# Patient Record
Sex: FEMALE | Race: WHITE | NOT HISPANIC OR LATINO | Employment: FULL TIME | URBAN - METROPOLITAN AREA
[De-identification: names, ages, dates, MRNs, and addresses within clinical notes are randomized per-mention and may not be internally consistent; named-entity substitution may affect disease eponyms.]

---

## 2017-01-02 ENCOUNTER — APPOINTMENT (OUTPATIENT)
Dept: URGENT CARE | Age: 32
End: 2017-01-02
Payer: COMMERCIAL

## 2017-01-02 PROCEDURE — G0382 LEV 3 HOSP TYPE B ED VISIT: HCPCS | Performed by: FAMILY MEDICINE

## 2017-01-10 ENCOUNTER — ALLSCRIPTS OFFICE VISIT (OUTPATIENT)
Dept: OTHER | Facility: OTHER | Age: 32
End: 2017-01-10

## 2017-01-10 DIAGNOSIS — M25.572 PAIN IN LEFT ANKLE: ICD-10-CM

## 2017-01-10 DIAGNOSIS — A08.4 VIRAL INTESTINAL INFECTION: ICD-10-CM

## 2017-01-10 DIAGNOSIS — J01.00 ACUTE MAXILLARY SINUSITIS: ICD-10-CM

## 2017-01-10 LAB
FLUAV AG SPEC QL IA: NEGATIVE
INFLUENZA B AG (HISTORICAL): NEGATIVE

## 2017-01-12 ENCOUNTER — LAB CONVERSION - ENCOUNTER (OUTPATIENT)
Dept: OTHER | Facility: OTHER | Age: 32
End: 2017-01-12

## 2017-01-12 LAB
A/G RATIO (HISTORICAL): 1.3 (CALC) (ref 1–2.5)
ALBUMIN SERPL BCP-MCNC: 4 G/DL (ref 3.6–5.1)
ALP SERPL-CCNC: 57 U/L (ref 33–115)
ALT SERPL W P-5'-P-CCNC: 15 U/L (ref 6–29)
AST SERPL W P-5'-P-CCNC: 16 U/L (ref 10–30)
BASOPHILS # BLD AUTO: 0.3 %
BASOPHILS # BLD AUTO: 15 CELLS/UL (ref 0–200)
BILIRUB SERPL-MCNC: 0.4 MG/DL (ref 0.2–1.2)
BUN SERPL-MCNC: 12 MG/DL (ref 7–25)
BUN/CREA RATIO (HISTORICAL): 11 (CALC) (ref 6–22)
CALCIUM SERPL-MCNC: 9.1 MG/DL (ref 8.6–10.2)
CHLORIDE SERPL-SCNC: 104 MMOL/L (ref 98–110)
CLOSTRIDIUM DIFFICILE TOXIN (HISTORICAL): DETECTED
CO2 SERPL-SCNC: 29 MMOL/L (ref 20–31)
CREAT SERPL-MCNC: 1.14 MG/DL (ref 0.5–1.1)
DEPRECATED RDW RBC AUTO: 15.4 % (ref 11–15)
EGFR AFRICAN AMERICAN (HISTORICAL): 73 ML/MIN/1.73M2
EGFR-AMERICAN CALC (HISTORICAL): 63 ML/MIN/1.73M2
EOSINOPHIL # BLD AUTO: 2 %
EOSINOPHIL # BLD AUTO: 98 CELLS/UL (ref 15–500)
GAMMA GLOBULIN (HISTORICAL): 3 G/DL (CALC) (ref 1.9–3.7)
GLUCOSE (HISTORICAL): 100 MG/DL (ref 65–99)
HCT VFR BLD AUTO: 41.7 % (ref 35–45)
HGB BLD-MCNC: 13.8 G/DL (ref 11.7–15.5)
LYMPHOCYTES # BLD AUTO: 1480 CELLS/UL (ref 850–3900)
LYMPHOCYTES # BLD AUTO: 30.2 %
MCH RBC QN AUTO: 27.1 PG (ref 27–33)
MCHC RBC AUTO-ENTMCNC: 33 G/DL (ref 32–36)
MCV RBC AUTO: 81.9 FL (ref 80–100)
MONOCYTES # BLD AUTO: 372 CELLS/UL (ref 200–950)
MONOCYTES (HISTORICAL): 7.6 %
NEUTROPHILS # BLD AUTO: 2935 CELLS/UL (ref 1500–7800)
NEUTROPHILS # BLD AUTO: 59.9 %
PLATELET # BLD AUTO: 267 THOUSAND/UL (ref 140–400)
PMV BLD AUTO: 9.6 FL (ref 7.5–11.5)
POTASSIUM SERPL-SCNC: 3.8 MMOL/L (ref 3.5–5.3)
RBC # BLD AUTO: 5.09 MILLION/UL (ref 3.8–5.1)
SODIUM SERPL-SCNC: 141 MMOL/L (ref 135–146)
TOTAL PROTEIN (HISTORICAL): 7 G/DL (ref 6.1–8.1)
WBC # BLD AUTO: 4.9 THOUSAND/UL (ref 3.8–10.8)

## 2017-01-13 ENCOUNTER — LAB CONVERSION - ENCOUNTER (OUTPATIENT)
Dept: OTHER | Facility: OTHER | Age: 32
End: 2017-01-13

## 2017-01-13 ENCOUNTER — GENERIC CONVERSION - ENCOUNTER (OUTPATIENT)
Dept: OTHER | Facility: OTHER | Age: 32
End: 2017-01-13

## 2017-01-13 LAB
CLOSTRIDIUM DIFFICILE TOXIN (HISTORICAL): DETECTED
CULTURE RESULT (HISTORICAL): NORMAL
SHIGA TOXIN TEST (HISTORICAL): NORMAL

## 2017-01-15 ENCOUNTER — LAB CONVERSION - ENCOUNTER (OUTPATIENT)
Dept: OTHER | Facility: OTHER | Age: 32
End: 2017-01-15

## 2017-01-15 LAB
CLOSTRIDIUM DIFFICILE TOXIN (HISTORICAL): DETECTED
CULTURE RESULT (HISTORICAL): NORMAL
CULTURE RESULT (HISTORICAL): NORMAL
SHIGA TOXIN TEST (HISTORICAL): NORMAL

## 2017-02-03 ENCOUNTER — HOSPITAL ENCOUNTER (EMERGENCY)
Facility: HOSPITAL | Age: 32
Discharge: HOME | End: 2017-02-04
Attending: EMERGENCY MEDICINE | Admitting: EMERGENCY MEDICINE
Payer: COMMERCIAL

## 2017-02-03 ENCOUNTER — APPOINTMENT (EMERGENCY)
Dept: CT IMAGING | Facility: HOSPITAL | Age: 32
End: 2017-02-03
Payer: COMMERCIAL

## 2017-02-03 DIAGNOSIS — K59.00 CONSTIPATION: Primary | ICD-10-CM

## 2017-02-03 LAB
ALBUMIN SERPL BCP-MCNC: 3.8 G/DL (ref 3.5–5)
ALP SERPL-CCNC: 70 U/L (ref 46–116)
ALT SERPL W P-5'-P-CCNC: 21 U/L (ref 12–78)
ANION GAP SERPL CALCULATED.3IONS-SCNC: 5 MMOL/L (ref 4–13)
AST SERPL W P-5'-P-CCNC: 16 U/L (ref 5–45)
BASOPHILS # BLD AUTO: 0.02 THOUSANDS/ΜL (ref 0–0.1)
BASOPHILS NFR BLD AUTO: 0 % (ref 0–1)
BILIRUB SERPL-MCNC: 0.4 MG/DL (ref 0.2–1)
BUN SERPL-MCNC: 15 MG/DL (ref 5–25)
CALCIUM SERPL-MCNC: 8.9 MG/DL (ref 8.3–10.1)
CHLORIDE SERPL-SCNC: 104 MMOL/L (ref 100–108)
CO2 SERPL-SCNC: 31 MMOL/L (ref 21–32)
CREAT SERPL-MCNC: 0.98 MG/DL (ref 0.6–1.3)
EOSINOPHIL # BLD AUTO: 0.35 THOUSAND/ΜL (ref 0–0.61)
EOSINOPHIL NFR BLD AUTO: 3 % (ref 0–6)
ERYTHROCYTE [DISTWIDTH] IN BLOOD BY AUTOMATED COUNT: 15 % (ref 11.6–15.1)
GFR SERPL CREATININE-BSD FRML MDRD: >60 ML/MIN/1.73SQ M
GLUCOSE SERPL-MCNC: 100 MG/DL (ref 65–140)
HCT VFR BLD AUTO: 40.4 % (ref 34.8–46.1)
HGB BLD-MCNC: 13.2 G/DL (ref 11.5–15.4)
LIPASE SERPL-CCNC: 237 U/L (ref 73–393)
LYMPHOCYTES # BLD AUTO: 3.52 THOUSANDS/ΜL (ref 0.6–4.47)
LYMPHOCYTES NFR BLD AUTO: 35 % (ref 14–44)
MCH RBC QN AUTO: 27.7 PG (ref 26.8–34.3)
MCHC RBC AUTO-ENTMCNC: 32.7 G/DL (ref 31.4–37.4)
MCV RBC AUTO: 85 FL (ref 82–98)
MONOCYTES # BLD AUTO: 0.87 THOUSAND/ΜL (ref 0.17–1.22)
MONOCYTES NFR BLD AUTO: 9 % (ref 4–12)
NEUTROPHILS # BLD AUTO: 5.42 THOUSANDS/ΜL (ref 1.85–7.62)
NEUTS SEG NFR BLD AUTO: 53 % (ref 43–75)
PLATELET # BLD AUTO: 247 THOUSANDS/UL (ref 149–390)
PMV BLD AUTO: 10.9 FL (ref 8.9–12.7)
POTASSIUM SERPL-SCNC: 3.6 MMOL/L (ref 3.5–5.3)
PROT SERPL-MCNC: 7.4 G/DL (ref 6.4–8.2)
RBC # BLD AUTO: 4.77 MILLION/UL (ref 3.81–5.12)
SODIUM SERPL-SCNC: 140 MMOL/L (ref 136–145)
WBC # BLD AUTO: 10.18 THOUSAND/UL (ref 4.31–10.16)

## 2017-02-03 PROCEDURE — 80053 COMPREHEN METABOLIC PANEL: CPT | Performed by: EMERGENCY MEDICINE

## 2017-02-03 PROCEDURE — 74177 CT ABD & PELVIS W/CONTRAST: CPT

## 2017-02-03 PROCEDURE — 85025 COMPLETE CBC W/AUTO DIFF WBC: CPT | Performed by: EMERGENCY MEDICINE

## 2017-02-03 PROCEDURE — 36415 COLL VENOUS BLD VENIPUNCTURE: CPT | Performed by: EMERGENCY MEDICINE

## 2017-02-03 PROCEDURE — 81002 URINALYSIS NONAUTO W/O SCOPE: CPT | Performed by: EMERGENCY MEDICINE

## 2017-02-03 PROCEDURE — 83690 ASSAY OF LIPASE: CPT | Performed by: EMERGENCY MEDICINE

## 2017-02-03 PROCEDURE — 96374 THER/PROPH/DIAG INJ IV PUSH: CPT

## 2017-02-03 PROCEDURE — 81025 URINE PREGNANCY TEST: CPT | Performed by: EMERGENCY MEDICINE

## 2017-02-03 RX ORDER — KETOROLAC TROMETHAMINE 30 MG/ML
15 INJECTION, SOLUTION INTRAMUSCULAR; INTRAVENOUS ONCE
Status: COMPLETED | OUTPATIENT
Start: 2017-02-03 | End: 2017-02-03

## 2017-02-03 RX ADMIN — KETOROLAC TROMETHAMINE 15 MG: 30 INJECTION, SOLUTION INTRAMUSCULAR at 23:07

## 2017-02-04 VITALS
HEART RATE: 85 BPM | BODY MASS INDEX: 31.95 KG/M2 | SYSTOLIC BLOOD PRESSURE: 104 MMHG | WEIGHT: 204 LBS | OXYGEN SATURATION: 99 % | RESPIRATION RATE: 16 BRPM | TEMPERATURE: 98.2 F | DIASTOLIC BLOOD PRESSURE: 60 MMHG

## 2017-02-04 LAB
CLARITY, POC: CLEAR
COLOR, POC: YELLOW
EXT BILIRUBIN, UA: NORMAL
EXT BLOOD URINE: NORMAL
EXT GLUCOSE, UA: NORMAL
EXT KETONES: NORMAL
EXT NITRITE, UA: NORMAL
EXT PH, UA: 6
EXT PROTEIN, UA: NORMAL
EXT SPECIFIC GRAVITY, UA: 1.01
EXT UROBILINOGEN: 0.2
HCG UR QL: NORMAL
WBC # BLD EST: NORMAL 10*3/UL

## 2017-02-04 PROCEDURE — 99284 EMERGENCY DEPT VISIT MOD MDM: CPT

## 2017-02-04 RX ORDER — LIDOCAINE HYDROCHLORIDE 20 MG/ML
JELLY TOPICAL ONCE
Status: COMPLETED | OUTPATIENT
Start: 2017-02-04 | End: 2017-02-04

## 2017-02-04 RX ORDER — MAGNESIUM CARB/ALUMINUM HYDROX 105-160MG
296 TABLET,CHEWABLE ORAL ONCE
Status: COMPLETED | OUTPATIENT
Start: 2017-02-04 | End: 2017-02-04

## 2017-02-04 RX ORDER — POLYETHYLENE GLYCOL 3350 17 G/17G
17 POWDER, FOR SOLUTION ORAL DAILY
Qty: 510 G | Refills: 0 | Status: SHIPPED | OUTPATIENT
Start: 2017-02-04 | End: 2018-06-15 | Stop reason: ALTCHOICE

## 2017-02-04 RX ADMIN — LIDOCAINE HYDROCHLORIDE: 20 JELLY TOPICAL at 02:02

## 2017-02-04 RX ADMIN — IOHEXOL 100 ML: 350 INJECTION, SOLUTION INTRAVENOUS at 00:39

## 2017-02-04 RX ADMIN — MAGESIUM CITRATE 296 ML: 1.75 LIQUID ORAL at 02:02

## 2017-03-13 ENCOUNTER — ALLSCRIPTS OFFICE VISIT (OUTPATIENT)
Dept: OTHER | Facility: OTHER | Age: 32
End: 2017-03-13

## 2017-03-13 LAB — S PYO AG THROAT QL: NEGATIVE

## 2017-04-20 PROCEDURE — G0145 SCR C/V CYTO,THINLAYER,RESCR: HCPCS | Performed by: OBSTETRICS & GYNECOLOGY

## 2017-04-21 ENCOUNTER — LAB REQUISITION (OUTPATIENT)
Dept: LAB | Facility: HOSPITAL | Age: 32
End: 2017-04-21
Payer: COMMERCIAL

## 2017-04-21 DIAGNOSIS — Z12.72 ENCOUNTER FOR SCREENING FOR MALIGNANT NEOPLASM OF VAGINA: ICD-10-CM

## 2017-04-21 DIAGNOSIS — Z01.419 ENCOUNTER FOR GYNECOLOGICAL EXAMINATION WITHOUT ABNORMAL FINDING: ICD-10-CM

## 2017-04-21 DIAGNOSIS — Z11.51 ENCOUNTER FOR SCREENING FOR HUMAN PAPILLOMAVIRUS (HPV): ICD-10-CM

## 2017-04-26 LAB
LAB AP GYN PRIMARY INTERPRETATION: NORMAL
Lab: NORMAL

## 2017-05-24 ENCOUNTER — ALLSCRIPTS OFFICE VISIT (OUTPATIENT)
Dept: OTHER | Facility: OTHER | Age: 32
End: 2017-05-24

## 2017-09-21 ENCOUNTER — ALLSCRIPTS OFFICE VISIT (OUTPATIENT)
Dept: OTHER | Facility: OTHER | Age: 32
End: 2017-09-21

## 2017-10-13 ENCOUNTER — ALLSCRIPTS OFFICE VISIT (OUTPATIENT)
Dept: OTHER | Facility: OTHER | Age: 32
End: 2017-10-13

## 2017-10-13 DIAGNOSIS — R74.8 ABNORMAL LEVELS OF OTHER SERUM ENZYMES: ICD-10-CM

## 2017-10-13 DIAGNOSIS — Z00.00 ENCOUNTER FOR GENERAL ADULT MEDICAL EXAMINATION WITHOUT ABNORMAL FINDINGS: ICD-10-CM

## 2017-10-13 DIAGNOSIS — Z13.220 ENCOUNTER FOR SCREENING FOR LIPOID DISORDERS: ICD-10-CM

## 2017-10-20 ENCOUNTER — HOSPITAL ENCOUNTER (EMERGENCY)
Facility: HOSPITAL | Age: 32
Discharge: HOME | End: 2017-10-20
Attending: EMERGENCY MEDICINE | Admitting: EMERGENCY MEDICINE
Payer: COMMERCIAL

## 2017-10-20 ENCOUNTER — APPOINTMENT (EMERGENCY)
Dept: CT IMAGING | Facility: HOSPITAL | Age: 32
End: 2017-10-20
Payer: COMMERCIAL

## 2017-10-20 VITALS
WEIGHT: 215 LBS | BODY MASS INDEX: 33.67 KG/M2 | SYSTOLIC BLOOD PRESSURE: 122 MMHG | RESPIRATION RATE: 16 BRPM | DIASTOLIC BLOOD PRESSURE: 80 MMHG | OXYGEN SATURATION: 98 % | TEMPERATURE: 97.8 F | HEART RATE: 78 BPM
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
## 2017-10-20 DIAGNOSIS — S16.1XXA ACUTE CERVICAL MYOFASCIAL STRAIN, INITIAL ENCOUNTER: Primary | ICD-10-CM

## 2017-10-20 PROCEDURE — 99284 EMERGENCY DEPT VISIT MOD MDM: CPT

## 2017-10-20 PROCEDURE — 72125 CT NECK SPINE W/O DYE: CPT

## 2017-10-20 RX ORDER — DIAZEPAM 5 MG/1
5 TABLET ORAL ONCE
Status: COMPLETED | OUTPATIENT
Start: 2017-10-20 | End: 2017-10-20

## 2017-10-20 RX ORDER — CYCLOBENZAPRINE HCL 10 MG
10 TABLET ORAL 3 TIMES DAILY PRN
Qty: 20 TABLET | Refills: 0 | Status: SHIPPED | OUTPATIENT
Start: 2017-10-20 | End: 2018-04-18 | Stop reason: ALTCHOICE

## 2017-10-20 RX ORDER — NAPROXEN 500 MG/1
500 TABLET ORAL 2 TIMES DAILY PRN
Qty: 20 TABLET | Refills: 0 | Status: SHIPPED | OUTPATIENT
Start: 2017-10-20 | End: 2018-04-18 | Stop reason: ALTCHOICE

## 2017-10-20 RX ADMIN — DIAZEPAM 5 MG: 5 TABLET ORAL at 19:03
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
## 2017-10-20 NOTE — DISCHARGE INSTRUCTIONS
Cervical Strain   WHAT YOU NEED TO KNOW:   A cervical strain is a stretched or torn muscle or tendon in your neck  Tendons are strong tissues that connect muscles to bones  Common causes of cervical strains include a car accident, a fall, or a sports injury  DISCHARGE INSTRUCTIONS:   Return to the emergency department if:   · You have pain or numbness from your shoulder down to your hand  · You have problems with your vision, hearing, or balance  · You feel confused or cannot concentrate  · You have problems with movement and strength  Contact your healthcare provider if:   · You have increased swelling or pain in your neck  · You have questions or concerns about your condition or care  Medicines: You may need any of the following:  · Acetaminophen  decreases pain and fever  It is available without a doctor's order  Ask how much to take and how often to take it  Follow directions  Read the labels of all other medicines you are using to see if they also contain acetaminophen, or ask your doctor or pharmacist  Acetaminophen can cause liver damage if not taken correctly  Do not use more than 4 grams (4,000 milligrams) total of acetaminophen in one day  · NSAIDs , such as ibuprofen, help decrease swelling, pain, and fever  This medicine is available with or without a doctor's order  NSAIDs can cause stomach bleeding or kidney problems in certain people  If you take blood thinner medicine, always ask your healthcare provider if NSAIDs are safe for you  Always read the medicine label and follow directions  · Muscle relaxers  help decrease pain and muscle spasms  · Prescription pain medicine  may be given  Ask your healthcare provider how to take this medicine safely  Some prescription pain medicines contain acetaminophen  Do not take other medicines that contain acetaminophen without talking to your healthcare provider  Too much acetaminophen may cause liver damage   Prescription pain medicine may cause constipation  Ask your healthcare provider how to prevent or treat constipation  · Take your medicine as directed  Contact your healthcare provider if you think your medicine is not helping or if you have side effects  Tell him or her if you are allergic to any medicine  Keep a list of the medicines, vitamins, and herbs you take  Include the amounts, and when and why you take them  Bring the list or the pill bottles to follow-up visits  Carry your medicine list with you in case of an emergency  Manage your symptoms:   · Apply heat  on your neck for 15 to 20 minutes, 4 to 6 times a day or as directed  Heat helps decrease pain, stiffness, and muscle spasms  · Begin gentle neck exercises  as soon as you can move your neck without pain  Exercises will help decrease stiffness and improve the strength and movement of your neck  Ask your healthcare provider what kind of exercises you should do  · Gradually return to your usual activities as directed  Stop if you have pain  Avoid activities that can cause more damage to your neck, such as heavy lifting or strenuous exercise  · Sleep without a pillow  to help decrease pain  Instead, roll a small towel tightly and place it under your neck  · Go to physical therapy as directed  A physical therapist teaches you exercises to help improve movement and strength, and to decrease pain  Prevent neck injury:   · Drive safely  Make sure everyone in your car wears a seatbelt  A seatbelt can save your life if you are in an accident  Do not use your cell phone when you are driving  This could distract you and cause an accident  Pull over if you need to make a call or send a text message  · Wear helmets, lifejackets, and protective gear  Always wear a helmet when you ride a bike or motorcycle, go skiing, or play sports that could cause a head injury  Wear protective equipment when you play sports   Wear a lifejacket when you are on a boat or doing water sports  Follow up with your healthcare provider as directed: You may be referred to an orthopedist or physical therapies  Write down your questions so you remember to ask them during your visits  © 2017 [address] Information is for End User's use only and may not be sold, redistributed or otherwise used for commercial purposes  All illustrations and images included in CareNotes® are the copyrighted property of [employer]  or [address]  The above information is an  only  It is not intended as medical advice for individual conditions or treatments  Talk to your doctor, nurse or pharmacist before following any medical regimen to see if it is safe and effective for you  Motor Vehicle Accident   WHAT YOU NEED TO KNOW:   A motor vehicle accident (MVA) can cause injury from the impact or from being thrown around inside the car  You may have a bruise on your abdomen, chest, or neck from the seatbelt  You may also have pain in your face, neck, or back  You may have pain in your knee, hip, or thigh if your body hits the dash or the steering wheel  Muscle pain is commonly worse 1 to 2 days after an MVA  DISCHARGE INSTRUCTIONS:   Call 911 if:   · You have new or worsening chest pain or shortness of breath  Return to the emergency department if:   · You have new or worsening pain in your abdomen  · You have nausea and vomiting that does not get better  · You have a severe headache  · You have weakness, tingling, or numbness in your arms or legs  · You have new or worsening pain that makes it hard for you to move  Contact your healthcare provider if:   · You have pain that develops 2 to 3 days after the MVA  · You have questions or concerns about your condition or care  Medicines:   · Pain medicine: You may be given medicine to take away or decrease pain  Do not wait until the pain is severe before you take your medicine      · NSAIDs , such as ibuprofen, help decrease swelling, pain, and fever  This medicine is available with or without a doctor's order  NSAIDs can cause stomach bleeding or kidney problems in certain people  If you take blood thinner medicine, always ask if NSAIDs are safe for you  Always read the medicine label and follow directions  Do not give these medicines to children under 10months of age without direction from your child's healthcare provider  · Take your medicine as directed  Contact your healthcare provider if you think your medicine is not helping or if you have side effects  Tell him of her if you are allergic to any medicine  Keep a list of the medicines, vitamins, and herbs you take  Include the amounts, and when and why you take them  Bring the list or the pill bottles to follow-up visits  Carry your medicine list with you in case of an emergency  Follow up with your healthcare provider as directed:  Write down your questions so you remember to ask them during your visits  Safety tips:   · Always wear your seatbelt  This will help reduce serious injury from an MVA  · Use child safety seats  Your child needs to ride in a child safety seat made for his age, height, and weight  Ask your healthcare provider for more information about child safety seats  · Decrease speed  Drive the speed limit to reduce your risk for an MVA  · Do not drive if you are tired  You will react more slowly when you are tired  The slowed reaction time will increase your risk for an MVA  · Do not talk or text on your cell phone while you drive  You cannot respond fast enough in an emergency if you are distracted by texts or conversations  · Do not drink and drive  Use a designated   Call a taxi or get a ride home with someone if you have been drinking  Do not let your friends drive if they have been drinking alcohol  · Do not use illegal drugs and drive    You may be more tired or take risks that you normally would not take  Do not drive after you take prescription medicines that make you sleepy  Self-care:   · Use ice and heat  Ice helps decrease swelling and pain  Ice may also help prevent tissue damage  Use an ice pack, or put crushed ice in a plastic bag  Cover it with a towel and apply to your injured area for 15 to 20 minutes every hour, or as directed  After 2 days, use a heating pad on your injured area  Use heat as directed  · Gently stretch  Use gentle exercises to stretch your muscles after an MVA  Ask your healthcare provider for exercises you can do  © 2017 [facility] Information is for End User's use only and may not be sold, redistributed or otherwise used for commercial purposes  All illustrations and images included in CareNotes® are the copyrighted property of [employer]  or [employer]  The above information is an  only  It is not intended as medical advice for individual conditions or treatments  Talk to your doctor, nurse or pharmacist before following any medical regimen to see if it is safe and effective for you

## 2017-10-28 ENCOUNTER — GENERIC CONVERSION - ENCOUNTER (OUTPATIENT)
Dept: OTHER | Facility: OTHER | Age: 32
End: 2017-10-28

## 2017-12-31 ENCOUNTER — LAB CONVERSION - ENCOUNTER (OUTPATIENT)
Dept: OTHER | Facility: OTHER | Age: 32
End: 2017-12-31

## 2017-12-31 LAB
BUN SERPL-MCNC: 11 MG/DL (ref 7–25)
BUN/CREA RATIO (HISTORICAL): NORMAL (CALC) (ref 6–22)
CALCIUM SERPL-MCNC: 8.8 MG/DL (ref 8.6–10.2)
CHLORIDE SERPL-SCNC: 107 MMOL/L (ref 98–110)
CO2 SERPL-SCNC: 25 MMOL/L (ref 20–31)
CREAT SERPL-MCNC: 0.99 MG/DL (ref 0.5–1.1)
EGFR AFRICAN AMERICAN (HISTORICAL): 86 ML/MIN/1.73M2
EGFR-AMERICAN CALC (HISTORICAL): 74 ML/MIN/1.73M2
GLUCOSE (HISTORICAL): 90 MG/DL (ref 65–99)
POTASSIUM SERPL-SCNC: 4.3 MMOL/L (ref 3.5–5.3)
SODIUM SERPL-SCNC: 139 MMOL/L (ref 135–146)
TSH SERPL DL<=0.05 MIU/L-ACNC: 0.9 MIU/L

## 2018-01-11 NOTE — MISCELLANEOUS
Message  Return to work or school:   Zia Russell is under my professional care   She was seen in my office on 1/10/17   She is able to return to work on  1/16/17            Signatures   Electronically signed by : Melva Romero, [facility]; Jan 13 2017  2:54PM EST                       (Author)

## 2018-01-11 NOTE — MISCELLANEOUS
Message  Pt was admitted 16 to [facility] for IOL secondary to preeclampsia and delivered a live boy via   Discharged 16 to home  Pt will f/u with OB so no JALIL needed at our office  [address]  Active Problems    1  Acute maxillary sinusitis (461 0) (J01 00)   2  Ankle pain, left (719 47) (M25 572)   3  Chronic cough (786 2) (R05)   4  Need for diphtheria-tetanus-pertussis (Tdap) vaccine, adult/adolescent (V06 1) (Z23)   5  Preop examination (V72 84) (Z01 818)    Current Meds   1  Flovent HFA 44 MCG/ACT Inhalation Aerosol; INHALE 1 PUFF TWICE DAILY; Therapy: [id number] to (Last Rx:2015)  Requested for: 2015 Ordered   2  ProAir  (90 Base) MCG/ACT Inhalation Aerosol Solution; INHALE 1 TO 2 PUFFS   EVERY 4 TO 6 HOURS AS NEEDED; Therapy: [id number] to (Last Rx:2015)  Requested for: [id number] Ordered    Allergies    1  No Known Drug Allergies    2  No Known Environmental Allergies   3   No Known Food Allergies    Signatures   Electronically signed by : Chasity Rodriguez, ; 2016  1:30PM EST                       (Author)

## 2018-01-12 VITALS
BODY MASS INDEX: 32.01 KG/M2 | DIASTOLIC BLOOD PRESSURE: 78 MMHG | TEMPERATURE: 100.2 F | SYSTOLIC BLOOD PRESSURE: 110 MMHG | WEIGHT: 204.38 LBS | OXYGEN SATURATION: 98 % | HEART RATE: 104 BPM

## 2018-01-14 VITALS
HEIGHT: 67 IN | TEMPERATURE: 99 F | BODY MASS INDEX: 32.8 KG/M2 | OXYGEN SATURATION: 99 % | HEART RATE: 80 BPM | WEIGHT: 209 LBS | DIASTOLIC BLOOD PRESSURE: 78 MMHG | SYSTOLIC BLOOD PRESSURE: 104 MMHG

## 2018-01-14 VITALS
HEIGHT: 67 IN | DIASTOLIC BLOOD PRESSURE: 78 MMHG | OXYGEN SATURATION: 98 % | WEIGHT: 218 LBS | HEART RATE: 76 BPM | SYSTOLIC BLOOD PRESSURE: 112 MMHG | TEMPERATURE: 98.4 F | BODY MASS INDEX: 34.21 KG/M2

## 2018-01-14 VITALS
SYSTOLIC BLOOD PRESSURE: 116 MMHG | TEMPERATURE: 98 F | OXYGEN SATURATION: 98 % | WEIGHT: 216.38 LBS | HEART RATE: 66 BPM | BODY MASS INDEX: 33.96 KG/M2 | HEIGHT: 67 IN | DIASTOLIC BLOOD PRESSURE: 74 MMHG

## 2018-01-15 NOTE — PROGRESS NOTES
Assessment   1  Chronic cough (786 2) (R05)  2  Lipid screening (V77 91) (Z13 220)    Plan  Depression screen    · *VB-Depression Screening; Status:Complete - Retrospective By Protocol Authorization;    Done: 73LLZ5739 04:18PM  Health Maintenance    · (1) CBC/ PLT (NO DIFF); Status:Active; Requested for:13Oct2017;    · (1) COMPREHENSIVE METABOLIC PANEL; Status:Active; Requested VCZ:83CVO1063;    · Follow-up visit in 1 year Evaluation and Treatment  Follow-up  Status: Hold For -  Scheduling  Requested for: 22ODQ5564   · Be sure to get at least 8 hours of sleep every night ; Status:Complete;   Done: 91ZZD1097  04:39PM   · Eat small frequent meals ; Status:Complete;   Done: 19GHL0577 04:39PM   · Keep a diary of when and what you eat ; Status:Complete;   Done: 71CVE6712 04:39PM   · Some eating tips that can help you lose weight ; Status:Complete;   Done: 55BUW7586  04:39PM   · There are many exercise options for seniors ; Status:Complete;   Done: 06WCW0556  04:39PM   · We recommend that you bring your body mass index down to 26 ; Status:Complete;    Done: 02OOS5706 04:39PM   · Call [phone] if: The symptoms seem worse ; Status:Complete;   Done: 95SZH3238  04:39PM  Health Maintenance, Lipid screening    · (1) LIPID PANEL, FASTING; Status:Active; Requested for:13Oct2017;     Discussion/Summary  health maintenance visit Currently, she eats an adequate diet  the risks and benefits of cervical cancer screening were discussed Breast cancer screening: breast cancer screening is not indicated  Colorectal cancer screening: colorectal cancer screening is not indicated  Osteoporosis screening: bone mineral density testing is not indicated  Screening lab work includes hemoglobin, glucose and lipid profile  The risks and benefits of immunizations were discussed  Advice and education were given regarding nutrition, aerobic exercise, weight bearing exercise, weight loss, alcohol use, sunscreen use and seat belt use   Patient discussion: discussed with the patient  75-year-old female:  1  Health maintenance: Patient will receive influenza immunization once her bronchitis has resolved  Otherwise she has no active issues/concerns  Will order a lipid panel for screening, CBC and CMP  Contact office for any questions or concerns  The patient was counseled regarding diagnostic results, instructions for management, risk factor reductions, prognosis, patient and family education, impressions, risks and benefits of treatment options, importance of compliance with treatment  Educational resources provided:   Possible side effects of new medications were reviewed with the patient/guardian today  The treatment plan was reviewed with the patient/guardian  The patient/guardian understands and agrees with the treatment plan      Chief Complaint  PT here for Yearly physical  Would like to have work up her thyroid due to Family history Mother has Hyperthyroid  PT stated still has cough that is productive with yellow thick mucus stated Benzonatate did not work   Also stated she is feeling very tired and irritable lately  Discuss other inhalers pt stated they do help her      History of Present Illness  HM, Adult Female: The patient is being seen for a health maintenance evaluation  The last health maintenance visit was 1 year(s) ago  Social History: Household members include spouse and 1 son(s)  She is   Work status: working full time  The patient has never smoked cigarettes  She reports occasional alcohol use  She has never used illicit drugs  General Health: The patient's health since the last visit is described as good  She has regular dental visits  She denies vision problems  She denies hearing loss  Immunizations status: up to date  Lifestyle:  She consumes a diverse and healthy diet  She does not have any weight concerns  She does not exercise regularly  She does not use tobacco  She consumes alcohol  She denies drug use  Reproductive health: the patient is premenopausal   she reports normal menses  she uses contraception  she is sexually active  pregnancy history:  Screening: cancer screening reviewed and current  metabolic screening reviewed and current  risk screening reviewed and current  HPI: 58-year-old female is seen today for routine annual examination  Other than continued symptoms of bronchitis, she has no other active complaints  1        1 Amended By: Rajesh Yao; Oct 13 2017 4:45 PM EST    Review of Systems    Constitutional: no fever, no recent weight gain, no chills and no recent weight loss  Eyes: no eye pain, no eyesight problems, no dryness of the eyes, eyes not red and no itching of the eyes  ENT: no earache, no nosebleeds, no sore throat, no hearing loss and no hoarseness  Cardiovascular: no chest pain, no intermittent leg claudication, no palpitations and no lower extremity edema  Respiratory: cough, but no shortness of breath, no wheezing, no shortness of breath during exertion and no PND  Gastrointestinal: no abdominal pain, no nausea, no vomiting, no constipation, no diarrhea and no blood in stools  Genitourinary: no dysuria and no incontinence  Musculoskeletal: no arthralgias and no myalgias  Integumentary: no rashes and no skin lesions  Neurological: no headache, no numbness and no dizziness  Psychiatric: no anxiety and no depression  Endocrine: no muscle weakness  Hematologic/Lymphatic: no swollen glands, no tendency for easy bleeding, no tendency for easy bruising and no swollen glands in the neck  Over the past 2 weeks, how often have you been bothered by the following problems? 1 ) Little interest or pleasure in doing things? Not at all    2 ) Feeling down, depressed or hopeless? Half the days or more  3 ) Trouble falling asleep or sleeping too much? Half the days or more  4 ) Feeling tired or having little energy? Nearly every day     5 ) Poor appetite or overeating? Nearly every day  6 ) Feeling bad about yourself, or that you are a failure, or have let yourself or your family down? Not at all    7 ) Trouble concentrating on things, such as reading a newspaper or watching television? Not at all    8 ) Moving or speaking so slowly that other people could have noticed, or the opposite, moving or speaking faster than usual? Not at all  severity of depression is moderate   How difficult have these problems made it for you to do your work, take care of things at home, or get along with people? Somewhat difficult  Score 10     ROS reviewed  Active Problems   1   Chronic cough (786 2) (R05)    Past Medical History    · History of Acute maxillary sinusitis (461 0) (J01 00)   · History of Acute URI (465 9) (J06 9)   · History of Ankle pain, left (719 47) (M25 572)   · History of Bilateral acute otitis media (382 9) (H66 93)   · History of C  difficile diarrhea (008 45) (A04 72)   · History of Denial Of Any Significant Medical History   · History of Fall on same level from slipping, tripping, or stumbling with subsequent  striking against other object (V082 5) (W63 264I)   · History of Foot pain (729 5) (M79 673)   · History of constipation (V12 79) (Z87 19)   · History of fever (V13 89) (C12 166)   · History of headache (V13 89) (F94 843)   · History of headache (V13 89) (Q84 958)   · History of hypercholesterolemia (V12 29) (Z86 39)   · History of viral gastroenteritis (V12 09) (Z86 19)   · History of Lack of energy (780 79) (R53 83)   · History of Left ankle pain (719 47) (M25 572)   · History of Pain in joint of right wrist (719 43) (M25 531)   · History of Snoring (786 09) (R06 83)   · History of Wrist Sprain (842 00)    Surgical History    · History of Ankle Surgery   · History of Ostectomy Calcaneus For Spur    Family History  Mother    · Family history of Hyperthyroidism  Father    · Family history of Essential Hypertension  Paternal Grandfather    · Family history of Diabetes Mellitus (V18 0)    Social History    · Being A Social Drinker   ·    · Mental disability (319) (F79)   · Attention Deficit Hyperactivity Disorder ( tx with ritalin and wellbutrin)depression   · Never A Smoker   · Never Used Drugs   · Number of children   · none   · Occupation   · student    Current Meds  1  Asmanex 14 Metered Doses 220 MCG/INH Inhalation Aerosol Powder Breath Activated;   INHALE 1 PUFF TWICE DAILY; Therapy: 29UOG0740 to (Last Rx:77Ezm0353) Ordered  2  ProAir  (90 Base) MCG/ACT Inhalation Aerosol Solution; INHALE 1 TO 2 PUFFS   EVERY 4 TO 6 HOURS AS NEEDED; Therapy: 17SNB4521 to (Last SL:24DOA9556)  Requested for: 90MLC9082 Ordered    Allergies   1  No Known Drug Allergies   2  No Known Environmental Allergies  3  No Known Food Allergies    Vitals   Recorded: 84BYA0450 04:07PM   Temperature 98 F, Tympanic   Heart Rate 66   Systolic 367 mm Hg, LUE, Sitting   Diastolic 74 mm Hg, LUE, Sitting   Height 5 ft 7 in   Weight 216 lb 6 oz   BMI Calculated 33 89 kg/m2   BSA Calculated 2 09 m2   O2 Saturation 98, RA     Physical Exam    Constitutional   General appearance: No acute distress, well appearing and well nourished  Head and Face   Head and face: Normal     Palpation of the face and sinuses: No sinus tenderness  Eyes   Conjunctiva and lids: No swelling, erythema or discharge  Ears, Nose, Mouth, and Throat   External inspection of ears and nose: Normal     Otoscopic examination: Tympanic membranes translucent with normal light reflex  Canals patent without erythema  Hearing: Normal     Nasal mucosa, septum, and turbinates: Normal without edema or erythema  Lips, teeth, and gums: Normal, good dentition  Oropharynx: Normal with no erythema, edema, exudate or lesions  Neck   Neck: Supple, symmetric, trachea midline, no masses  Thyroid: Normal, no thyromegaly      Pulmonary   Respiratory effort: No increased work of breathing or signs of respiratory distress  Percussion of chest: Normal     Palpation of chest: Normal     Auscultation of lungs: Clear to auscultation  Cardiovascular   Palpation of heart: Normal PMI, no thrills  Auscultation of heart: Normal rate and rhythm, normal S1 and S2, no murmurs  Femoral pulses: 2+ bilaterally  Examination of extremities for edema and/or varicosities: Normal     Abdomen   Abdomen: Non-tender, no masses  Liver and spleen: No hepatomegaly or splenomegaly  Lymphatic   Palpation of lymph nodes in neck: No lymphadenopathy  Musculoskeletal   Gait and station: Normal     Digits and nails: Normal without clubbing or cyanosis  Joints, bones, and muscles: Normal     Range of motion: Normal     Stability: Normal     Muscle strength/tone: Normal     Skin   Skin and subcutaneous tissue: Normal without rashes or lesions  Palpation of skin and subcutaneous tissue: Normal turgor  Neurologic   Cranial nerves: Cranial nerves II-XII intact  Cortical function: Normal mental status  Reflexes: 2+ and symmetric  Sensation: No sensory loss  Coordination: Normal finger to nose and heel to shin  Psychiatric   Judgment and insight: Normal     Orientation to person, place, and time: Normal     Recent and remote memory: Intact  Mood and affect: Normal        Results/Data  *VB-Depression Screening 16RLW3283 04:18PM Lc Butler     Test Name Result Flag Reference   Depression Scale Result      Depression Screen - Positive Findings       Signatures   Electronically signed by :  Silvia Matson MD; Oct 13 2017  4:45PM EST                       (Author)

## 2018-01-16 NOTE — MISCELLANEOUS
Message  Return to work or school:   Peter Kan is under my professional care  She was seen in my office on 9/21/2017   She is able to return to work on  9/25/2017            Past Medical History  Active Problems And Past Medical History Reviewed: The active problems and past medical history were reviewed and updated today  Family History  Family History Reviewed: The family history was reviewed and updated today  Surgical History  Surgical History Reviewed: The surgical history was reviewed and updated today  Future Appointments    Signatures   Electronically signed by :  Aurelio Chan MD; Sep 21 2017  2:16PM EST                       (Author)

## 2018-01-18 NOTE — MISCELLANEOUS
Message  Return to work or school:   Shadia Diamond is under my professional care   She was seen in my office on 12/30/16 Excuse from work 12/30/16             Signatures   Electronically signed by : Alexx Tovar [facility]; Dec 30 2016 10:48AM EST                       (Author)    Electronically signed by : Alexx Tovar [facility]; Dec 30 2016 10:49AM EST                       (Author)    Electronically signed by : Alexx Tovar [facility]; Dec 30 2016  6:28PM EST

## 2018-01-22 VITALS
DIASTOLIC BLOOD PRESSURE: 72 MMHG | HEART RATE: 78 BPM | HEIGHT: 67 IN | BODY MASS INDEX: 38.95 KG/M2 | OXYGEN SATURATION: 97 % | WEIGHT: 248.15 LBS | TEMPERATURE: 98.4 F | SYSTOLIC BLOOD PRESSURE: 114 MMHG

## 2018-04-18 ENCOUNTER — OFFICE VISIT (OUTPATIENT)
Dept: INTERNAL MEDICINE CLINIC | Age: 33
End: 2018-04-18
Payer: COMMERCIAL

## 2018-04-18 VITALS
BODY MASS INDEX: 35.28 KG/M2 | HEART RATE: 80 BPM | WEIGHT: 224.8 LBS | OXYGEN SATURATION: 97 % | DIASTOLIC BLOOD PRESSURE: 76 MMHG | TEMPERATURE: 97.9 F | HEIGHT: 67 IN | SYSTOLIC BLOOD PRESSURE: 104 MMHG

## 2018-04-18 DIAGNOSIS — M54.9 BACK PAIN, UNSPECIFIED BACK LOCATION, UNSPECIFIED BACK PAIN LATERALITY, UNSPECIFIED CHRONICITY: ICD-10-CM

## 2018-04-18 DIAGNOSIS — J30.9 ALLERGIC RHINITIS, UNSPECIFIED SEASONALITY, UNSPECIFIED TRIGGER: ICD-10-CM

## 2018-04-18 DIAGNOSIS — J30.9 ALLERGIC SINUSITIS: Primary | ICD-10-CM

## 2018-04-18 PROBLEM — J20.9 ACUTE BRONCHITIS: Status: ACTIVE | Noted: 2017-10-13

## 2018-04-18 PROCEDURE — 99213 OFFICE O/P EST LOW 20 MIN: CPT | Performed by: NURSE PRACTITIONER

## 2018-04-18 RX ORDER — FLUTICASONE PROPIONATE 50 MCG
1 SPRAY, SUSPENSION (ML) NASAL DAILY
Qty: 16 G | Refills: 0 | Status: SHIPPED | OUTPATIENT
Start: 2018-04-18 | End: 2020-11-04 | Stop reason: ALTCHOICE

## 2018-04-18 RX ORDER — ALBUTEROL SULFATE 90 UG/1
1-2 AEROSOL, METERED RESPIRATORY (INHALATION) AS NEEDED
COMMUNITY
Start: 2015-03-19 | End: 2019-01-30

## 2018-04-18 RX ORDER — DIPHENOXYLATE HYDROCHLORIDE AND ATROPINE SULFATE 2.5; .025 MG/1; MG/1
1 TABLET ORAL DAILY
COMMUNITY
End: 2020-11-04 | Stop reason: ALTCHOICE

## 2018-04-18 RX ORDER — NORETHINDRONE ACETATE AND ETHINYL ESTRADIOL AND FERROUS FUMARATE 1MG-20(21)
1 KIT ORAL DAILY
COMMUNITY
Start: 2018-04-17 | End: 2019-08-12 | Stop reason: SDUPTHER

## 2018-04-18 NOTE — LETTER
April 18, 2018     Patient: Kayli Davis   YOB: 1985   Date of Visit: 4/18/2018       To Whom it May Concern:    Moody Bacon is under my professional care  She was seen in my office on 4/18/2018  She may return to work on 4/19/18  If you have any questions or concerns, please don't hesitate to call           Sincerely,          AKSHAT Paniagua        CC: No Recipients

## 2018-04-18 NOTE — ASSESSMENT & PLAN NOTE
Patient has complaints of lumbar back pain  Patient has been taking Tylenol and this seems to alleviate her discomfort  Patient was given handout for back stretches  Patient would like to see if she gets any better, was offered physical therapy however does not want to do physical therapy at this point time

## 2018-04-18 NOTE — PATIENT INSTRUCTIONS
Illness appears to be viral in nature  Rest and fluids advised  Educated that the course of this illness could be 2-4 weeks  Discussed symptomatic relief, such as warm steam inhalations, tylenol/ibuprofen for fevers and body aches, rest, and drink plenty of fluids  Warm salt gargles for sore throat  Discussed red flag signs to go to the ER, such as chest pain or shortness of breath  Return to the office for reevaluation if symptoms worsen or do not improve in 1-2 weeks    Start using Flonase 1-2 times per day to help open nasal passages and help with any allergy component that there may be to this illness  Also, start using over-the-counter Mucinex DM to help thin secretions and decrease cough

## 2018-04-18 NOTE — PROGRESS NOTES
Assessment/Plan:    No problem-specific Assessment & Plan notes found for this encounter  Diagnoses and all orders for this visit:    Allergic sinusitis  -     fluticasone (FLONASE) 50 mcg/act nasal spray; 1 spray into each nostril daily    Allergic rhinitis, unspecified seasonality, unspecified trigger    Back pain, unspecified back location, unspecified back pain laterality, unspecified chronicity    Other orders  -     JUNEL FE 1/20 1-20 MG-MCG per tablet; Take 1 tablet by mouth daily  -     albuterol (PROAIR HFA) 90 mcg/act inhaler; Inhale 1-2 puffs as needed  -     multivitamin (THERAGRAN) TABS; Take 1 tablet by mouth daily          Subjective:      Patient ID: Min Lainez is a 27 y o  female  Pt  Presents today for chills, scratchy voice, bilateral ear pressure and low back pain  Her symptoms started yesterday and patient took Tylenol  Has been taking excedrine migraine this morning  Did get flu shot this year  Patient also has complaints of back pain to the lumbar area, has been taking Tylenol/Advil with relief  URI    This is a new problem  The current episode started yesterday  The problem has been waxing and waning  Maximum temperature: low grade fever 99 9  Associated symptoms include ear pain, headaches (pressure over left side of the eye), a plugged ear sensation and a sore throat  Pertinent negatives include no abdominal pain, chest pain, congestion, coughing, diarrhea, dysuria, nausea, neck pain, rash, rhinorrhea, sinus pain, vomiting or wheezing  Associated symptoms comments: Body aches  Treatments tried: Claritin  The treatment provided no relief         The following portions of the patient's history were reviewed and updated as appropriate: allergies, current medications, past family history, past medical history, past social history, past surgical history and problem list     Review of Systems   Constitutional: Positive for appetite change (decreased ) and chills  Negative for activity change, diaphoresis and fever  HENT: Positive for ear pain and sore throat  Negative for congestion, ear discharge, postnasal drip, rhinorrhea, sinus pain and sinus pressure  Eyes: Negative for pain, discharge, itching and visual disturbance  Respiratory: Negative for cough, chest tightness, shortness of breath and wheezing  Cardiovascular: Negative for chest pain, palpitations and leg swelling  Gastrointestinal: Negative for abdominal pain, constipation, diarrhea, nausea and vomiting  Denies loss of bowel or bladder   Endocrine: Negative for polydipsia, polyphagia and polyuria  Genitourinary: Negative for difficulty urinating, dysuria and urgency  Musculoskeletal: Positive for back pain  Negative for arthralgias and neck pain  Skin: Negative for rash and wound  Neurological: Positive for dizziness (one episode this morning) and headaches (pressure over left side of the eye)  Negative for weakness and numbness  Past Medical History:   Diagnosis Date    ADHD (attention deficit hyperactivity disorder)     Asthma     sports induced    Chiari malformation type II ([facility] [address] )     Clostridium difficile infection     Pre-eclampsia in third trimester 7/21/2016    Varicella     childhood         Current Outpatient Prescriptions:     albuterol (PROAIR HFA) 90 mcg/act inhaler, Inhale 1-2 puffs as needed, Disp: , Rfl:     albuterol (PROVENTIL HFA,VENTOLIN HFA) 90 mcg/act inhaler, Inhale 2 puffs every 6 (six) hours as needed for wheezing , Disp: , Rfl:     folic acid (FOLVITE) 1 mg tablet, Take 1 mg by mouth daily  , Disp: , Rfl:     JUNEL FE 1/20 1-20 MG-MCG per tablet, Take 1 tablet by mouth daily, Disp: , Rfl:     multivitamin (THERAGRAN) TABS, Take 1 tablet by mouth daily, Disp: , Rfl:     ranitidine (ZANTAC) 75 MG tablet, Take 75 mg by mouth as needed  , Disp: , Rfl:     Vitamin D, Cholecalciferol, 1000 UNITS CAPS, Take by mouth daily  , Disp: , Rfl:   polyethylene glycol (MIRALAX) 17 g packet, Take 17 g by mouth daily for 30 days Prn constipation, Disp: 510 g, Rfl: 0    No Known Allergies    Social History   Past Surgical History:   Procedure Laterality Date    ACHILLES TENDON LENGTHENING      [place]  DELIVERY ONLY N/A 2016    Procedure:  SECTION (); Surgeon: Tatiana Ragland MD;  Location: [facility];  Service: Obstetrics     Family History   Problem Relation Age of Onset    Hypothyroidism Mother     Rheumatic fever Mother     Hypertension Father     Depression Father     Alzheimer's disease Father     ADD / ADHD Brother     Hearing loss Paternal Grandfather     Diabetes Paternal Grandfather     Hypertension Paternal Grandfather        Objective:  /76 (BP Location: Left arm, Patient Position: Sitting, Cuff Size: Standard)   Pulse 80   Temp 97 9 °F (36 6 °C) (Tympanic)   Ht 5' 7 36" (1 711 m)   Wt 102 kg (224 lb 12 8 oz)   SpO2 97%   BMI 34 83 kg/m²     No results found for this or any previous visit (from the past 1344 hour(s))  Physical Exam   Constitutional: She is oriented to person, place, and time  She appears well-developed and well-nourished  No distress  HENT:   Head: Normocephalic and atraumatic  Right Ear: External ear normal  Tympanic membrane is bulging  A middle ear effusion is present  Left Ear: External ear normal  Tympanic membrane is bulging  A middle ear effusion is present  Nose: Mucosal edema and rhinorrhea present  Right sinus exhibits frontal sinus tenderness  Left sinus exhibits frontal sinus tenderness  Mouth/Throat: Posterior oropharyngeal erythema present  No oropharyngeal exudate  Eyes: Conjunctivae and EOM are normal  Pupils are equal, round, and reactive to light  Right eye exhibits no discharge  Left eye exhibits no discharge  Neck: Normal range of motion  Neck supple  No thyromegaly present     Cardiovascular: Normal rate, regular rhythm, normal heart sounds and intact distal pulses  Exam reveals no gallop and no friction rub  No murmur heard  Pulmonary/Chest: Effort normal and breath sounds normal  No stridor  No respiratory distress  She has no wheezes  She has no rales  Abdominal: Soft  Bowel sounds are normal  She exhibits no distension  There is no tenderness  Lymphadenopathy:     She has no cervical adenopathy  Neurological: She is alert and oriented to person, place, and time  She displays normal reflexes  Skin: Skin is warm and dry  No rash noted  She is not diaphoretic  No erythema  Psychiatric: She has a normal mood and affect   Her behavior is normal  Judgment and thought content normal

## 2018-05-02 PROCEDURE — 87624 HPV HI-RISK TYP POOLED RSLT: CPT | Performed by: OBSTETRICS & GYNECOLOGY

## 2018-05-02 PROCEDURE — G0145 SCR C/V CYTO,THINLAYER,RESCR: HCPCS | Performed by: OBSTETRICS & GYNECOLOGY

## 2018-05-03 ENCOUNTER — LAB REQUISITION (OUTPATIENT)
Dept: LAB | Facility: HOSPITAL | Age: 33
End: 2018-05-03
Payer: COMMERCIAL

## 2018-05-03 DIAGNOSIS — Z01.419 ENCOUNTER FOR GYNECOLOGICAL EXAMINATION WITHOUT ABNORMAL FINDING: ICD-10-CM

## 2018-05-03 DIAGNOSIS — Z11.51 ENCOUNTER FOR SCREENING FOR HUMAN PAPILLOMAVIRUS (HPV): ICD-10-CM

## 2018-05-08 LAB
HPV RRNA GENITAL QL NAA+PROBE: NORMAL
LAB AP GYN PRIMARY INTERPRETATION: NORMAL
LAB AP LMP: NORMAL
Lab: NORMAL

## 2018-06-15 ENCOUNTER — OFFICE VISIT (OUTPATIENT)
Dept: INTERNAL MEDICINE CLINIC | Age: 33
End: 2018-06-15
Payer: COMMERCIAL

## 2018-06-15 VITALS
WEIGHT: 224.6 LBS | DIASTOLIC BLOOD PRESSURE: 68 MMHG | HEIGHT: 67 IN | HEART RATE: 51 BPM | OXYGEN SATURATION: 98 % | BODY MASS INDEX: 35.25 KG/M2 | SYSTOLIC BLOOD PRESSURE: 110 MMHG | TEMPERATURE: 98.2 F

## 2018-06-15 DIAGNOSIS — Z13.228 SCREENING FOR METABOLIC DISORDER: ICD-10-CM

## 2018-06-15 DIAGNOSIS — R10.11 RUQ PAIN: ICD-10-CM

## 2018-06-15 DIAGNOSIS — R10.9 ABDOMINAL PAIN, UNSPECIFIED ABDOMINAL LOCATION: Primary | ICD-10-CM

## 2018-06-15 DIAGNOSIS — J45.30 MILD PERSISTENT ASTHMA WITHOUT COMPLICATION: ICD-10-CM

## 2018-06-15 LAB — SL AMB POCT URINE HCG: NORMAL

## 2018-06-15 PROCEDURE — 81025 URINE PREGNANCY TEST: CPT | Performed by: NURSE PRACTITIONER

## 2018-06-15 PROCEDURE — 99213 OFFICE O/P EST LOW 20 MIN: CPT | Performed by: NURSE PRACTITIONER

## 2018-06-15 RX ORDER — FLUTICASONE FUROATE AND VILANTEROL 100; 25 UG/1; UG/1
1 POWDER RESPIRATORY (INHALATION) DAILY
Qty: 1 INHALER | Refills: 0 | Status: SHIPPED | COMMUNITY
Start: 2018-06-15 | End: 2018-06-15 | Stop reason: SDUPTHER

## 2018-06-15 RX ORDER — FLUTICASONE FUROATE AND VILANTEROL 100; 25 UG/1; UG/1
1 POWDER RESPIRATORY (INHALATION) DAILY
Qty: 2 INHALER | Refills: 0 | Status: SHIPPED | COMMUNITY
Start: 2018-06-15

## 2018-06-15 NOTE — PROGRESS NOTES
Assessment/Plan:    RUQ pain    Will get ultrasound of right upper quadrant, to rule out any gallbladder issues  Patient will get blood work which will include an amylase lipase a CBC with diff and a CMP  And also a pregnancy blood test   Patient was given a handout to review foods to avoid that may affect her gallbladder such as fatty foods and alcohol  Discussed red flag symptoms and when patient should report to the emergency room  Will have patient follow up with me in about a week to 2 weeks to review blood work and ultrasound results  Diagnoses and all orders for this visit:    Abdominal pain, unspecified abdominal location  -     POCT urine HCG    RUQ pain  -     US gallbladder; Future  -     CBC and differential  -     Comprehensive metabolic panel; Future  -     Lipase; Future  -     POCT urine HCG  -     hCG, quantitative; Future  -     Amylase; Future    Screening for metabolic disorder  -     Lipid panel    Mild persistent asthma without complication  -     fluticasone-vilanterol (BREO ELLIPTA) 100-25 mcg/inh inhaler; Inhale 1 puff daily Rinse mouth after use  Subjective:      Patient ID: Syeda Rosas is a 26 y o  female  Patient presents today with complaints of abdominal pain for about 1 week  She does have nausea and diarrhea at times  She has been taking Motrin for pain with minimal relief  Patient complains of trouble sleeping last 3 night due to the sensation of "skin crawling"   Denies pain to the arms, it feels like the "nerves are active", her skin is sensitive to touch at night time  She is currently taking the pill, takes as prescribed, took a pregenany test last night which was negative  Patient has history of ovarian cysts  Denies any recent change to her diet  Abdominal Pain   This is a new problem  The current episode started in the past 7 days  The onset quality is sudden   Episode frequency: hurt the most prior to when she has to have a bowel movement  The problem has been waxing and waning  The pain is located in the RUQ  The pain is at a severity of 4/10  Quality: stabbing  The abdominal pain radiates to the epigastric region  Associated symptoms include diarrhea (on and off) and nausea  Pertinent negatives include no arthralgias, belching, constipation, dysuria, fever, flatus, headaches, hematuria or vomiting  The pain is aggravated by eating  The pain is relieved by bowel movements  Treatments tried: motrin  The treatment provided mild relief  The following portions of the patient's history were reviewed and updated as appropriate: allergies, current medications, past family history, past medical history, past social history, past surgical history and problem list     Review of Systems   Constitutional: Negative for activity change, appetite change, chills, diaphoresis and fever  HENT: Negative for congestion, ear discharge, ear pain, postnasal drip, rhinorrhea, sinus pain, sinus pressure and sore throat  Eyes: Negative for pain, discharge, itching and visual disturbance  Respiratory: Negative for cough, chest tightness, shortness of breath and wheezing  Cardiovascular: Negative for chest pain, palpitations and leg swelling  Gastrointestinal: Positive for abdominal pain, diarrhea (on and off) and nausea  Negative for constipation, flatus and vomiting  Endocrine: Negative for polydipsia, polyphagia and polyuria  Genitourinary: Negative for difficulty urinating, dysuria, hematuria and urgency  Musculoskeletal: Negative for arthralgias, back pain and neck pain  Skin: Negative for rash and wound  Neurological: Negative for dizziness, weakness, numbness and headaches           Past Medical History:   Diagnosis Date    ADHD (attention deficit hyperactivity disorder)     Asthma     sports induced    [facility]     Clostridium difficile infection     Pre-eclampsia in third trimester 7/21/2016    Varicella     childhood         Current Outpatient Prescriptions:     albuterol (PROAIR HFA) 90 mcg/act inhaler, Inhale 1-2 puffs as needed, Disp: , Rfl:     albuterol (PROVENTIL HFA,VENTOLIN HFA) 90 mcg/act inhaler, Inhale 2 puffs every 6 (six) hours as needed for wheezing , Disp: , Rfl:     fluticasone (FLONASE) 50 mcg/act nasal spray, 1 spray into each nostril daily, Disp: 16 g, Rfl: 0    folic acid (FOLVITE) 1 mg tablet, Take 1 mg by mouth daily  , Disp: , Rfl:     JUNEL FE 1/20 1-20 MG-MCG per tablet, Take 1 tablet by mouth daily, Disp: , Rfl:     multivitamin (THERAGRAN) TABS, Take 1 tablet by mouth daily, Disp: , Rfl:     ranitidine (ZANTAC) 75 MG tablet, Take 75 mg by mouth as needed  , Disp: , Rfl:     Vitamin D, Cholecalciferol, 1000 UNITS CAPS, Take by mouth daily  , Disp: , Rfl:     fluticasone-vilanterol (BREO ELLIPTA) 100-25 mcg/inh inhaler, Inhale 1 puff daily Rinse mouth after use , Disp: 1 Inhaler, Rfl: 0    No Known Allergies    Social History   Past Surgical History:   Procedure Laterality Date    ACHILLES TENDON LENGTHENING      [place]  DELIVERY ONLY N/A 2016    Procedure:  SECTION ();   Surgeon: Maxim Berkowitz MD;  Location: [facility];  Service: Obstetrics     Family History   Problem Relation Age of Onset    Hypothyroidism Mother     Rheumatic fever Mother     Hypertension Father     Depression Father     Alzheimer's disease Father     ADD / ADHD Brother     Hearing loss Paternal Grandfather     Diabetes Paternal Grandfather     Hypertension Paternal Grandfather        Objective:  /68 (BP Location: Left arm, Patient Position: Sitting, Cuff Size: Adult)   Pulse (!) 51   Temp 98 2 °F (36 8 °C) (Tympanic)   Ht 5' 6 63" (1 693 m)   Wt 102 kg (224 lb 9 6 oz)   LMP 05/15/2018 (Exact Date)   SpO2 98% Comment: room air  BMI 35 56 kg/m²     Recent Results (from the past 1344 hour(s))   Liquid-based pap, screening    Collection Time: 18 12:00 AM Result Value Ref Range    Case Report       Gynecologic Cytology Report                       Case: [id number]                                  Authorizing Provider:  Jose Angel Meneses MD            Collected:           05/02/2018                   First Screen:          LEYDI Boewn     Received:            05/04/2018 0800              Specimen:    LIQUID-BASED PAP, SCREENING, Cervix                                                        Primary Interpretation Negative for intraepithelial lesion or malignancy     Specimen Adequacy       Satisfactory for evaluation  Absence of endocervical/transformation zone component  High Risk HPV Result       HPV, High Risk: HPV NEG, HPV16 NEG, HPV18 NEG      Other High Risk HPV Negative, HPV 16 Negative, HPV 18 Negative  HPV types: 16,18,31,33,35,39,45,51,52,56,58,59,66 and 68 DNA are undetectable or below the pre-set threshold  [employer]s FDA approved Rafael 4800 is utilized with strict adherence to the s instruction  manual to test for the presence of High-Risk HPV DNA, as well as HPV 16 and HPV 18  This instrument  has been validated by our laboratory and/or by the   A negative result does not preclude the presence of HPV infection because results depend on adequate  specimen collection, absence of inhibitors and sufficient DNA to be detected  Additionally, HPV negative  results are not intended to prevent women from proceeding to colposcopy if clinically warranted  Positive HPV test results indicate the presence of any one or more of the high risk types, but since patients  are often co-infected with low-risk types it does not rule out the presence of low-risk  types in patients  with mixed infections      Additional Information       [employer]'s FDA approved ,  and ThinPrep Imaging System are utilized with strict adherence to the 's instruction manual to prepare gynecologic and non-gynecologic cytology specimens for the production of ThinPrep slides as well as for gynecologic ThinPrep imaging  These processes have been validated by our laboratory and/or by the   The Pap test is not a diagnostic procedure and should not be used as the sole means to detect cervical cancer  It is only a screening procedure to aid in the detection of cervical cancer and its precursors  Both false-negative and false-positive results have been experienced  Your patient's test result should be interpreted in this context together with the history and clinical findings  LMP 4/23/2018    HPV High Risk    Collection Time: 05/08/18  1:41 PM   Result Value Ref Range    HPV, High Risk HPV NEG, HPV16 NEG, HPV18 NEG HPV NEG, HPV16 NEG, HPV18 NEG   POCT urine HCG    Collection Time: 06/15/18  3:38 PM   Result Value Ref Range     URINE HCG NEG             Physical Exam   Constitutional: She is oriented to person, place, and time  She appears well-developed and well-nourished  No distress  HENT:   Head: Normocephalic and atraumatic  Right Ear: External ear normal    Left Ear: External ear normal    Nose: Nose normal    Mouth/Throat: Oropharynx is clear and moist  No oropharyngeal exudate  Eyes: Conjunctivae and EOM are normal  Pupils are equal, round, and reactive to light  Right eye exhibits no discharge  Left eye exhibits no discharge  Neck: Normal range of motion  Neck supple  No thyromegaly present  Cardiovascular: Normal rate, regular rhythm, normal heart sounds and intact distal pulses  Exam reveals no gallop and no friction rub  No murmur heard  Pulmonary/Chest: Effort normal and breath sounds normal  No stridor  No respiratory distress  She has no wheezes  She has no rales  Abdominal: Soft  Bowel sounds are normal  She exhibits no distension  There is tenderness in the right upper quadrant  There is no rigidity, no rebound, no guarding, no tenderness at McBurney's point and negative Patrick's sign     Lymphadenopathy:     She has no cervical adenopathy  Neurological: She is alert and oriented to person, place, and time  Skin: Skin is warm and dry  No rash noted  She is not diaphoretic  No erythema  Psychiatric: She has a normal mood and affect   Her behavior is normal  Judgment and thought content normal

## 2018-06-15 NOTE — ASSESSMENT & PLAN NOTE
Will get ultrasound of right upper quadrant, to rule out any gallbladder issues  Patient will get blood work which will include an amylase lipase a CBC with diff and a CMP  And also a pregnancy blood test   Patient was given a handout to review foods to avoid that may affect her gallbladder such as fatty foods and alcohol  Discussed red flag symptoms and when patient should report to the emergency room  Will have patient follow up with me in about a week to 2 weeks to review blood work and ultrasound results

## 2018-06-20 LAB
ACHR BIND AB SER-SCNC: <0.3 NMOL/L
ALBUMIN SERPL-MCNC: 4.1 G/DL (ref 3.6–5.1)
ALBUMIN/GLOB SERPL: 1.2 (CALC) (ref 1–2.5)
ALP SERPL-CCNC: 64 U/L (ref 33–115)
ALT SERPL-CCNC: 22 U/L (ref 6–29)
AMYLASE SERPL-CCNC: 43 U/L (ref 21–101)
AST SERPL-CCNC: 18 U/L (ref 10–30)
B-HCG SERPL-ACNC: <2 MIU/ML
BASOPHILS # BLD AUTO: 52 CELLS/UL (ref 0–200)
BASOPHILS NFR BLD AUTO: 1 %
BILIRUB SERPL-MCNC: 0.5 MG/DL (ref 0.2–1.2)
BUN SERPL-MCNC: 11 MG/DL (ref 7–25)
BUN/CREAT SERPL: NORMAL (CALC) (ref 6–22)
CALCIUM SERPL-MCNC: 9.1 MG/DL (ref 8.6–10.2)
CHLORIDE SERPL-SCNC: 107 MMOL/L (ref 98–110)
CHOLEST SERPL-MCNC: 189 MG/DL
CHOLEST/HDLC SERPL: 3.6 (CALC)
CO2 SERPL-SCNC: 28 MMOL/L (ref 20–31)
CREAT SERPL-MCNC: 1.07 MG/DL (ref 0.5–1.1)
EOSINOPHIL # BLD AUTO: 198 CELLS/UL (ref 15–500)
EOSINOPHIL NFR BLD AUTO: 3.8 %
ERYTHROCYTE [DISTWIDTH] IN BLOOD BY AUTOMATED COUNT: 13.2 % (ref 11–15)
GLOBULIN SER CALC-MCNC: 3.3 G/DL (CALC) (ref 1.9–3.7)
GLUCOSE SERPL-MCNC: 84 MG/DL (ref 65–99)
HCT VFR BLD AUTO: 42.4 % (ref 35–45)
HDLC SERPL-MCNC: 52 MG/DL
HGB BLD-MCNC: 13.7 G/DL (ref 11.7–15.5)
LDLC SERPL CALC-MCNC: 117 MG/DL (CALC)
LYMPHOCYTES # BLD AUTO: 2434 CELLS/UL (ref 850–3900)
LYMPHOCYTES NFR BLD AUTO: 46.8 %
MCH RBC QN AUTO: 27.4 PG (ref 27–33)
MCHC RBC AUTO-ENTMCNC: 32.3 G/DL (ref 32–36)
MCV RBC AUTO: 84.8 FL (ref 80–100)
MONOCYTES # BLD AUTO: 458 CELLS/UL (ref 200–950)
MONOCYTES NFR BLD AUTO: 8.8 %
NEUTROPHILS # BLD AUTO: 2059 CELLS/UL (ref 1500–7800)
NEUTROPHILS NFR BLD AUTO: 39.6 %
NONHDLC SERPL-MCNC: 137 MG/DL (CALC)
PLATELET # BLD AUTO: 247 THOUSAND/UL (ref 140–400)
PMV BLD REES-ECKER: 11.5 FL (ref 7.5–12.5)
POTASSIUM SERPL-SCNC: 4.2 MMOL/L (ref 3.5–5.3)
PROT SERPL-MCNC: 7.4 G/DL (ref 6.1–8.1)
RBC # BLD AUTO: 5 MILLION/UL (ref 3.8–5.1)
SL AMB EGFR AFRICAN AMERICAN: 78 ML/MIN/1.73M2
SL AMB EGFR NON AFRICAN AMERICAN: 67 ML/MIN/1.73M2
SODIUM SERPL-SCNC: 140 MMOL/L (ref 135–146)
TRIGL SERPL-MCNC: 92 MG/DL
WBC # BLD AUTO: 5.2 THOUSAND/UL (ref 3.8–10.8)

## 2018-06-24 ENCOUNTER — HOSPITAL ENCOUNTER (OUTPATIENT)
Dept: ULTRASOUND IMAGING | Facility: HOSPITAL | Age: 33
Discharge: HOME | End: 2018-06-24
Payer: COMMERCIAL

## 2018-06-24 DIAGNOSIS — R10.11 RUQ PAIN: ICD-10-CM

## 2018-06-24 PROCEDURE — 76705 ECHO EXAM OF ABDOMEN: CPT

## 2018-06-29 ENCOUNTER — OFFICE VISIT (OUTPATIENT)
Dept: INTERNAL MEDICINE CLINIC | Age: 33
End: 2018-06-29
Payer: COMMERCIAL

## 2018-06-29 VITALS
SYSTOLIC BLOOD PRESSURE: 90 MMHG | OXYGEN SATURATION: 98 % | BODY MASS INDEX: 34.72 KG/M2 | HEART RATE: 60 BPM | WEIGHT: 221.2 LBS | TEMPERATURE: 98.1 F | RESPIRATION RATE: 16 BRPM | HEIGHT: 67 IN | DIASTOLIC BLOOD PRESSURE: 66 MMHG

## 2018-06-29 DIAGNOSIS — M54.2 NECK PAIN: Primary | ICD-10-CM

## 2018-06-29 DIAGNOSIS — K80.80 BILIARY CALCULUS OF OTHER SITE WITHOUT OBSTRUCTION: ICD-10-CM

## 2018-06-29 PROBLEM — K80.20 CHOLELITHIASIS: Status: ACTIVE | Noted: 2018-06-29

## 2018-06-29 PROCEDURE — 99214 OFFICE O/P EST MOD 30 MIN: CPT | Performed by: NURSE PRACTITIONER

## 2018-06-29 PROCEDURE — 3008F BODY MASS INDEX DOCD: CPT | Performed by: NURSE PRACTITIONER

## 2018-06-29 PROCEDURE — 1036F TOBACCO NON-USER: CPT | Performed by: NURSE PRACTITIONER

## 2018-06-29 NOTE — ASSESSMENT & PLAN NOTE
Advised patient to continue with the gallbladder diet, as she is having mild relief with this  Also advised patient that we could consider a consult to General surgery when she feels this is necessary  She is to contact our office immediately if her pain gets worse  Patient is in agreement with our plan  Will have patient follow up with our office in 1 month

## 2018-06-29 NOTE — PROGRESS NOTES
Assessment/Plan:    Cholelithiasis    Advised patient to continue with the gallbladder diet, as she is having mild relief with this  Also advised patient that we could consider a consult to General surgery when she feels this is necessary  She is to contact our office immediately if her pain gets worse  Patient is in agreement with our plan  Neck pain   Feel as though the "pins and needles "that patient is feeling in bilateral arms could be caused by a pinched nerve in her neck  Patient has tried a chiropractor in the past, and would like to see the chiropractor for this issue  Will also give patient a referral to physical therapy to help with neck stretches  Patient is advised to do gentle neck stretches as well as use ice and heat for relief  Discussed red flag symptoms with patient when she should report to the emergency room  Will hold off on imaging at this point, will consider imaging if pain is worsening  Diagnoses and all orders for this visit:    Neck pain  -     Ambulatory referral to Physical Therapy; Future    Biliary calculus of other site without obstruction          Subjective:      Patient ID: Lj Baker is a 26 y o  female  Patient presents today for follow-up on blood work and ultrasound results due to complaints of right upper quadrant pain  Patient has been following a gallbladder diet, and she states she feels little better  Her ultrasound results showed Cholelithiasis  Reviewed blood work and ultrasound results with patient  Patient also states that she  Still has the sensation of pins and needles in her arms at night, she does states she is having more stress than normal   She has had neck issues in the past, which she seeing a chiropractor for  She takes Motrin for relief at night and does  Provided mild relief  Patient also states she fell off her horse about 4 months ago,   Patient denies any injury to her neck        Neck Pain    This is a new problem  The current episode started 1 to 4 weeks ago  The problem has been waxing and waning  The pain is associated with nothing  Pertinent negatives include no chest pain, fever, headaches, numbness or weakness  Treatments tried: motrin  The following portions of the patient's history were reviewed and updated as appropriate: allergies, current medications, past family history, past medical history, past social history, past surgical history and problem list     Review of Systems   Constitutional: Negative for activity change, appetite change, chills, diaphoresis and fever  HENT: Negative for congestion, ear discharge, ear pain, postnasal drip, rhinorrhea, sinus pain, sinus pressure and sore throat  Eyes: Negative for pain, discharge, itching and visual disturbance  Respiratory: Negative for cough, chest tightness, shortness of breath and wheezing  Cardiovascular: Negative for chest pain, palpitations and leg swelling  Gastrointestinal: Positive for abdominal pain (RUQ pain) and nausea  Negative for constipation, diarrhea and vomiting  Endocrine: Negative for polydipsia, polyphagia and polyuria  Genitourinary: Negative for difficulty urinating, dysuria and urgency  Musculoskeletal: Negative for arthralgias, back pain and neck pain  Skin: Negative for rash and wound  Neurological: Negative for dizziness, weakness, numbness and headaches          "pins and needles in B/L arms"         Past Medical History:   Diagnosis Date    ADHD (attention deficit hyperactivity disorder)     Asthma     sports induced    [facility]     Clostridium difficile infection     Pre-eclampsia in third trimester 7/21/2016    Varicella     childhood         Current Outpatient Prescriptions:     albuterol (PROAIR HFA) 90 mcg/act inhaler, Inhale 1-2 puffs as needed, Disp: , Rfl:     albuterol (PROVENTIL HFA,VENTOLIN HFA) 90 mcg/act inhaler, Inhale 2 puffs every 6 (six) hours as needed for wheezing , Disp: , Rfl:     fluticasone (FLONASE) 50 mcg/act nasal spray, 1 spray into each nostril daily, Disp: 16 g, Rfl: 0    fluticasone-vilanterol (BREO ELLIPTA) 100-25 mcg/inh inhaler, Inhale 1 puff daily Rinse mouth after use , Disp: 2 Inhaler, Rfl: 0    folic acid (FOLVITE) 1 mg tablet, Take 1 mg by mouth daily  , Disp: , Rfl:     JUNEL FE  1-20 MG-MCG per tablet, Take 1 tablet by mouth daily, Disp: , Rfl:     multivitamin (THERAGRAN) TABS, Take 1 tablet by mouth daily, Disp: , Rfl:     ranitidine (ZANTAC) 75 MG tablet, Take 75 mg by mouth as needed  , Disp: , Rfl:     Vitamin D, Cholecalciferol, 1000 UNITS CAPS, Take by mouth daily  , Disp: , Rfl:     No Known Allergies    Social History   Past Surgical History:   Procedure Laterality Date    ACHILLES TENDON LENGTHENING      [place]  DELIVERY ONLY N/A 2016    Procedure:  SECTION ();   Surgeon: Birgit Pérez MD;  Location: [facility] ;  Service: Obstetrics     Family History   Problem Relation Age of Onset    Hypothyroidism Mother     Rheumatic fever Mother     Hypertension Father     Depression Father     Alzheimer's disease Father     ADD / ADHD Brother     Hearing loss Paternal Grandfather     Diabetes Paternal Grandfather     Hypertension Paternal Grandfather        Objective:  BP 90/66 (BP Location: Left arm, Patient Position: Sitting, Cuff Size: Large)   Pulse 60   Temp 98 1 °F (36 7 °C) (Tympanic)   Resp 16   Ht 5' 7 32" (1 71 m)   Wt 100 kg (221 lb 3 2 oz)   SpO2 98%   BMI 34 31 kg/m²     Recent Results (from the past 1344 hour(s))   HPV High Risk    Collection Time: 18  1:41 PM   Result Value Ref Range    HPV, High Risk HPV NEG, HPV16 NEG, HPV18 NEG HPV NEG, HPV16 NEG, HPV18 NEG   POCT urine HCG    Collection Time: 06/15/18  3:38 PM   Result Value Ref Range     URINE HCG NEG    Lipid panel    Collection Time: 18  8:18 AM   Result Value Ref Range    Total Cholesterol 189 <200 mg/dL    HDL 52 >50 mg/dL    Triglycerides 92 <150 mg/dL    LDL Direct 117 (H) mg/dL (calc)    Chol HDLC Ratio 3 6 <5 0 (calc)    Non-HDL Cholesterol 137 (H) <130 mg/dL (calc)   Comprehensive metabolic panel    Collection Time: 06/16/18  8:18 AM   Result Value Ref Range    SL AMB GLUCOSE 84 65 - 99 mg/dL    BUN 11 7 - 25 mg/dL    Creatinine, Serum 1 07 0 50 - 1 10 mg/dL    eGFR Non  67 > OR = 60 mL/min/1 73m2    SL AMB EGFR  78 > OR = 60 mL/min/1 73m2    SL AMB BUN/CREATININE RATIO NOT APPLICABLE 6 - 22 (calc)    SL AMB SODIUM 140 135 - 146 mmol/L    SL AMB POTASSIUM 4 2 3 5 - 5 3 mmol/L    SL AMB CHLORIDE 107 98 - 110 mmol/L    SL AMB CARBON DIOXIDE 28 20 - 31 mmol/L    SL AMB CALCIUM 9 1 8 6 - 10 2 mg/dL    SL AMB PROTEIN, TOTAL 7 4 6 1 - 8 1 g/dL    Serum Albumin 4 1 3 6 - 5 1 g/dL    SL AMB GLOBULIN 3 3 1 9 - 3 7 g/dL (calc)    SL AMB ALBUMIN/GLOBULIN RATIO 1 2 1 0 - 2 5 (calc)    SL AMB BILIRUBIN, TOTAL 0 5 0 2 - 1 2 mg/dL    SL AMB ALKALINE PHOSPHATASE 64 33 - 115 U/L    SL AMB AST 18 10 - 30 U/L    SL AMB ALT 22 6 - 29 U/L   Acetylcholine receptor, binding    Collection Time: 06/16/18  8:18 AM   Result Value Ref Range    AChR Binding Ab, Serum <0 30 nmol/L   CBC and differential    Collection Time: 06/16/18  8:18 AM   Result Value Ref Range    SL AMB LAB WHITE BLOOD CELL COUNT 5 2 3 8 - 10 8 Thousand/uL    SL AMB LAB RED BLOOD CELLS 5 00 3 80 - 5 10 Million/uL    Hemoglobin 13 7 11 7 - 15 5 g/dL    Hematocrit 42 4 35 0 - 45 0 %    MCV 84 8 80 0 - 100 0 fL    MCH 27 4 27 0 - 33 0 pg    MCHC 32 3 32 0 - 36 0 g/dL    RDW 13 2 11 0 - 15 0 %    Platelet Count 015 693 - 400 Thousand/uL    SL AMB MPV 11 5 7 5 - 12 5 fL    Neutrophils (Absolute) 2,059 1,500 - 7,800 cells/uL    Lymphocytes (Absolute) 2,434 850 - 3,900 cells/uL    Monocytes (Absolute) 458 200 - 950 cells/uL    Eosinophils (Absolute) 198 15 - 500 cells/uL    Basophils (Absolute) 52 0 - 200 cells/uL    Neutrophils 39 6 %    Lymphocytes 46 8 %    Monocytes 8 8 %    Eosinophils 3 8 %    Basophils 1 0 %   HCG, Total, Qn    Collection Time: 06/16/18  8:18 AM   Result Value Ref Range    HCG, Quantitative <2 mIU/mL   Amylase    Collection Time: 06/16/18  8:18 AM   Result Value Ref Range    SL AMB AMYLASE, SERUM 43 21 - 101 U/L            Physical Exam   Constitutional: She is oriented to person, place, and time  She appears well-developed and well-nourished  No distress  HENT:   Head: Normocephalic and atraumatic  Right Ear: External ear normal    Left Ear: External ear normal    Nose: Nose normal    Mouth/Throat: Oropharynx is clear and moist  No oropharyngeal exudate  Eyes: Conjunctivae and EOM are normal  Pupils are equal, round, and reactive to light  Right eye exhibits no discharge  Left eye exhibits no discharge  Neck: Normal range of motion  Neck supple  No thyromegaly present  Cardiovascular: Normal rate, regular rhythm, normal heart sounds and intact distal pulses  Exam reveals no gallop and no friction rub  No murmur heard  Pulmonary/Chest: Effort normal  No stridor  No respiratory distress  She has wheezes in the right upper field  She has no rales  Abdominal: Soft  Bowel sounds are normal  She exhibits no distension  There is tenderness in the right upper quadrant  Musculoskeletal: Normal range of motion  She exhibits no edema or tenderness  Lymphadenopathy:     She has no cervical adenopathy  Neurological: She is alert and oriented to person, place, and time  No cranial nerve deficit  Skin: Skin is warm and dry  No rash noted  She is not diaphoretic  No erythema  Psychiatric: She has a normal mood and affect   Her behavior is normal  Judgment and thought content normal

## 2018-06-29 NOTE — ASSESSMENT & PLAN NOTE
Feel as though the "pins and needles "that patient is feeling in bilateral arms could be caused by a pinched nerve in her neck  Patient has tried a chiropractor in the past, and would like to see the chiropractor for this issue  Will also give patient a referral to physical therapy to help with neck stretches  Patient is advised to do gentle neck stretches as well as use ice and heat for relief  Discussed red flag symptoms with patient when she should report to the emergency room  Will hold off on imaging at this point, will consider imaging if pain is worsening

## 2019-01-19 ENCOUNTER — APPOINTMENT (EMERGENCY)
Dept: CT IMAGING | Facility: HOSPITAL | Age: 34
End: 2019-01-19
Payer: COMMERCIAL

## 2019-01-19 ENCOUNTER — HOSPITAL ENCOUNTER (EMERGENCY)
Facility: HOSPITAL | Age: 34
Discharge: HOME | End: 2019-01-19
Attending: EMERGENCY MEDICINE
Payer: COMMERCIAL

## 2019-01-19 VITALS
BODY MASS INDEX: 34.64 KG/M2 | DIASTOLIC BLOOD PRESSURE: 63 MMHG | OXYGEN SATURATION: 98 % | WEIGHT: 223.33 LBS | RESPIRATION RATE: 16 BRPM | HEART RATE: 58 BPM | SYSTOLIC BLOOD PRESSURE: 121 MMHG | TEMPERATURE: 98.3 F

## 2019-01-19 DIAGNOSIS — R10.9 ABDOMINAL PAIN: Primary | ICD-10-CM

## 2019-01-19 DIAGNOSIS — K80.50 BILIARY COLIC: ICD-10-CM

## 2019-01-19 DIAGNOSIS — R11.2 NAUSEA AND VOMITING: ICD-10-CM

## 2019-01-19 LAB
ALBUMIN SERPL BCP-MCNC: 3.4 G/DL (ref 3.5–5)
ALP SERPL-CCNC: 71 U/L (ref 46–116)
ALT SERPL W P-5'-P-CCNC: 25 U/L (ref 12–78)
ANION GAP SERPL CALCULATED.3IONS-SCNC: 8 MMOL/L (ref 4–13)
APTT PPP: 33 SECONDS (ref 26–38)
AST SERPL W P-5'-P-CCNC: 16 U/L (ref 5–45)
B-HCG SERPL-ACNC: <2 MIU/ML
BASOPHILS # BLD AUTO: 0.04 THOUSANDS/ΜL (ref 0–0.1)
BASOPHILS NFR BLD AUTO: 1 % (ref 0–1)
BILIRUB SERPL-MCNC: 0.3 MG/DL (ref 0.2–1)
BILIRUB UR QL STRIP: NEGATIVE
BUN SERPL-MCNC: 9 MG/DL (ref 5–25)
CALCIUM SERPL-MCNC: 8.9 MG/DL (ref 8.3–10.1)
CHLORIDE SERPL-SCNC: 106 MMOL/L (ref 100–108)
CLARITY UR: NORMAL
CO2 SERPL-SCNC: 27 MMOL/L (ref 21–32)
COLOR UR: YELLOW
CREAT SERPL-MCNC: 1.06 MG/DL (ref 0.6–1.3)
EOSINOPHIL # BLD AUTO: 0.24 THOUSAND/ΜL (ref 0–0.61)
EOSINOPHIL NFR BLD AUTO: 3 % (ref 0–6)
ERYTHROCYTE [DISTWIDTH] IN BLOOD BY AUTOMATED COUNT: 12.4 % (ref 11.6–15.1)
GFR SERPL CREATININE-BSD FRML MDRD: 68 ML/MIN/1.73SQ M
GLUCOSE SERPL-MCNC: 98 MG/DL (ref 65–140)
GLUCOSE UR STRIP-MCNC: NEGATIVE MG/DL
HCT VFR BLD AUTO: 41.4 % (ref 34.8–46.1)
HGB BLD-MCNC: 13.4 G/DL (ref 11.5–15.4)
HGB UR QL STRIP.AUTO: NEGATIVE
IMM GRANULOCYTES # BLD AUTO: 0.02 THOUSAND/UL (ref 0–0.2)
IMM GRANULOCYTES NFR BLD AUTO: 0 % (ref 0–2)
INR PPP: 0.98 (ref 0.86–1.17)
KETONES UR STRIP-MCNC: NEGATIVE MG/DL
LACTATE SERPL-SCNC: 0.8 MMOL/L (ref 0.5–2)
LEUKOCYTE ESTERASE UR QL STRIP: NEGATIVE
LIPASE SERPL-CCNC: 240 U/L (ref 73–393)
LYMPHOCYTES # BLD AUTO: 2.63 THOUSANDS/ΜL (ref 0.6–4.47)
LYMPHOCYTES NFR BLD AUTO: 32 % (ref 14–44)
MCH RBC QN AUTO: 27.4 PG (ref 26.8–34.3)
MCHC RBC AUTO-ENTMCNC: 32.4 G/DL (ref 31.4–37.4)
MCV RBC AUTO: 85 FL (ref 82–98)
MONOCYTES # BLD AUTO: 0.65 THOUSAND/ΜL (ref 0.17–1.22)
MONOCYTES NFR BLD AUTO: 8 % (ref 4–12)
NEUTROPHILS # BLD AUTO: 4.6 THOUSANDS/ΜL (ref 1.85–7.62)
NEUTS SEG NFR BLD AUTO: 56 % (ref 43–75)
NITRITE UR QL STRIP: NEGATIVE
NRBC BLD AUTO-RTO: 0 /100 WBCS
PH UR STRIP.AUTO: 6 [PH] (ref 4.5–8)
PLATELET # BLD AUTO: 270 THOUSANDS/UL (ref 149–390)
PMV BLD AUTO: 10.7 FL (ref 8.9–12.7)
POTASSIUM SERPL-SCNC: 3.9 MMOL/L (ref 3.5–5.3)
PROT SERPL-MCNC: 7.3 G/DL (ref 6.4–8.2)
PROT UR STRIP-MCNC: NEGATIVE MG/DL
PROTHROMBIN TIME: 12.7 SECONDS (ref 11.8–14.2)
RBC # BLD AUTO: 4.89 MILLION/UL (ref 3.81–5.12)
SODIUM SERPL-SCNC: 141 MMOL/L (ref 136–145)
SP GR UR STRIP.AUTO: 1.01 (ref 1–1.03)
UROBILINOGEN UR QL STRIP.AUTO: 0.2 E.U./DL
WBC # BLD AUTO: 8.18 THOUSAND/UL (ref 4.31–10.16)

## 2019-01-19 PROCEDURE — 85730 THROMBOPLASTIN TIME PARTIAL: CPT | Performed by: EMERGENCY MEDICINE

## 2019-01-19 PROCEDURE — 96374 THER/PROPH/DIAG INJ IV PUSH: CPT

## 2019-01-19 PROCEDURE — 85025 COMPLETE CBC W/AUTO DIFF WBC: CPT | Performed by: EMERGENCY MEDICINE

## 2019-01-19 PROCEDURE — 36415 COLL VENOUS BLD VENIPUNCTURE: CPT | Performed by: EMERGENCY MEDICINE

## 2019-01-19 PROCEDURE — 74177 CT ABD & PELVIS W/CONTRAST: CPT

## 2019-01-19 PROCEDURE — 81003 URINALYSIS AUTO W/O SCOPE: CPT

## 2019-01-19 PROCEDURE — 93005 ELECTROCARDIOGRAM TRACING: CPT

## 2019-01-19 PROCEDURE — 99284 EMERGENCY DEPT VISIT MOD MDM: CPT

## 2019-01-19 PROCEDURE — 96375 TX/PRO/DX INJ NEW DRUG ADDON: CPT

## 2019-01-19 PROCEDURE — 84702 CHORIONIC GONADOTROPIN TEST: CPT | Performed by: EMERGENCY MEDICINE

## 2019-01-19 PROCEDURE — 96361 HYDRATE IV INFUSION ADD-ON: CPT

## 2019-01-19 PROCEDURE — 80053 COMPREHEN METABOLIC PANEL: CPT | Performed by: EMERGENCY MEDICINE

## 2019-01-19 PROCEDURE — 85610 PROTHROMBIN TIME: CPT | Performed by: EMERGENCY MEDICINE

## 2019-01-19 PROCEDURE — 83605 ASSAY OF LACTIC ACID: CPT | Performed by: EMERGENCY MEDICINE

## 2019-01-19 PROCEDURE — 83690 ASSAY OF LIPASE: CPT | Performed by: EMERGENCY MEDICINE

## 2019-01-19 RX ORDER — SODIUM CHLORIDE 9 MG/ML
125 INJECTION, SOLUTION INTRAVENOUS CONTINUOUS
Status: DISCONTINUED | OUTPATIENT
Start: 2019-01-19 | End: 2019-01-19 | Stop reason: HOSPADM

## 2019-01-19 RX ORDER — ONDANSETRON 2 MG/ML
4 INJECTION INTRAMUSCULAR; INTRAVENOUS ONCE
Status: COMPLETED | OUTPATIENT
Start: 2019-01-19 | End: 2019-01-19

## 2019-01-19 RX ORDER — HYDROMORPHONE HCL/PF 1 MG/ML
0.5 SYRINGE (ML) INJECTION ONCE
Status: COMPLETED | OUTPATIENT
Start: 2019-01-19 | End: 2019-01-19

## 2019-01-19 RX ORDER — HYDROCODONE BITARTRATE AND ACETAMINOPHEN 5; 325 MG/1; MG/1
1 TABLET ORAL EVERY 6 HOURS PRN
Qty: 20 TABLET | Refills: 0 | Status: SHIPPED | OUTPATIENT
Start: 2019-01-19 | End: 2019-01-24

## 2019-01-19 RX ORDER — KETOROLAC TROMETHAMINE 30 MG/ML
15 INJECTION, SOLUTION INTRAMUSCULAR; INTRAVENOUS ONCE
Status: COMPLETED | OUTPATIENT
Start: 2019-01-19 | End: 2019-01-19

## 2019-01-19 RX ORDER — METOCLOPRAMIDE 10 MG/1
10 TABLET ORAL 4 TIMES DAILY
Qty: 28 TABLET | Refills: 0 | Status: SHIPPED | OUTPATIENT
Start: 2019-01-19 | End: 2019-01-26

## 2019-01-19 RX ADMIN — SODIUM CHLORIDE 1000 ML: 0.9 INJECTION, SOLUTION INTRAVENOUS at 00:41

## 2019-01-19 RX ADMIN — KETOROLAC TROMETHAMINE 15 MG: 30 INJECTION, SOLUTION INTRAMUSCULAR at 02:59

## 2019-01-19 RX ADMIN — IOHEXOL 100 ML: 350 INJECTION, SOLUTION INTRAVENOUS at 01:26

## 2019-01-19 RX ADMIN — HYDROMORPHONE HYDROCHLORIDE 0.5 MG: 1 INJECTION, SOLUTION INTRAMUSCULAR; INTRAVENOUS; SUBCUTANEOUS at 00:42

## 2019-01-19 RX ADMIN — ONDANSETRON 4 MG: 2 INJECTION INTRAMUSCULAR; INTRAVENOUS at 00:42
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
## 2019-01-19 NOTE — DISCHARGE INSTRUCTIONS
Abdominal Pain   WHAT YOU NEED TO KNOW:   Abdominal pain can be dull, achy, or sharp  You may have pain in one area of your abdomen, or in your entire abdomen  Your pain may be caused by a condition such as constipation, food sensitivity or poisoning, infection, or a blockage  Abdominal pain can also be from a hernia, appendicitis, or an ulcer  Liver, gallbladder, or kidney conditions can also cause abdominal pain  The cause of your abdominal pain may be unknown  DISCHARGE INSTRUCTIONS:   Return to the emergency department if:   · You have new chest pain or shortness of breath  · You have pulsing pain in your upper abdomen or lower back that suddenly becomes constant  · Your pain is in the right lower abdominal area and worsens with movement  · You have a fever over 100 4°F (38°C) or shaking chills  · You are vomiting and cannot keep food or liquids down  · Your pain does not improve or gets worse over the next 8 to 12 hours  · You see blood in your vomit or bowel movements, or they look black and tarry  · Your skin or the whites of your eyes turn yellow  · You are a woman and have a large amount of vaginal bleeding that is not your monthly period  Contact your healthcare provider if:   · You have pain in your lower back  · You are a man and have pain in your testicles  · You have pain when you urinate  · You have questions or concerns about your condition or care  Follow up with your healthcare provider within 24 hours or as directed:  Write down your questions so you remember to ask them during your visits  Medicines:   · Medicines  may be given to calm your stomach and prevent vomiting or to decrease pain  Ask how to take pain medicine safely  · Take your medicine as directed  Contact your healthcare provider if you think your medicine is not helping or if you have side effects  Tell him of her if you are allergic to any medicine   Keep a list of the medicines, vitamins, and herbs you take  Include the amounts, and when and why you take them  Bring the list or the pill bottles to follow-up visits  Carry your medicine list with you in case of an emergency  © 2017 [employer]  Information is for End User's use only and may not be sold, redistributed or otherwise used for commercial purposes  All illustrations and images included in CareNotes® are the copyrighted property of [employer]  or Roderick [employer]  The above information is an  only  It is not intended as medical advice for individual conditions or treatments  Talk to your doctor, nurse or pharmacist before following any medical regimen to see if it is safe and effective for you  Acute Nausea and Vomiting   WHAT YOU NEED TO KNOW:   Acute nausea and vomiting start suddenly, worsen quickly, and last a short time  DISCHARGE INSTRUCTIONS:   Return to the emergency department if:   · You see blood in your vomit or your bowel movements  · You have sudden, severe pain in your chest and upper abdomen after hard vomiting or retching  · You have swelling in your neck and chest      · You are dizzy, cold, and thirsty and your eyes and mouth are dry  · You are urinating very little or not at all  · You have muscle weakness, leg cramps, and trouble breathing  · Your heart is beating much faster than normal      · You continue to vomit for more than 48 hours  Contact your healthcare provider if:   · You have frequent dry heaves (vomiting but nothing comes out)  · Your nausea and vomiting does not get better or go away after you use medicine  · You have questions or concerns about your condition or treatment  Medicines: You may need any of the following:  · Medicines  may be given to calm your stomach and stop your vomiting  You may also need medicines to help you feel more relaxed or to stop nausea and vomiting caused by motion sickness      · Gastrointestinal stimulants  are used to help empty your stomach and bowels  This may help decrease nausea and vomiting  · Take your medicine as directed  Contact your healthcare provider if you think your medicine is not helping or if you have side effects  Tell him or her if you are allergic to any medicine  Keep a list of the medicines, vitamins, and herbs you take  Include the amounts, and when and why you take them  Bring the list or the pill bottles to follow-up visits  Carry your medicine list with you in case of an emergency  Prevent or manage acute nausea and vomiting:   · Do not drink alcohol  Alcohol may upset or irritate your stomach  Too much alcohol can also cause acute nausea and vomiting  · Control stress  Headaches due to stress may cause nausea and vomiting  Find ways to relax and manage your stress  Get more rest and sleep  · Drink more liquids as directed  Vomiting can lead to dehydration  It is important to drink more liquids to help replace lost body fluids  Ask your healthcare provider how much liquid to drink each day and which liquids are best for you  Your provider may recommend that you drink an oral rehydration solution (ORS)  ORS contains water, salts, and sugar that are needed to replace the lost body fluids  Ask what kind of ORS to use, how much to drink, and where to get it  · Eat smaller meals, more often  Eat small amounts of food every 2 to 3 hours, even if you are not hungry  Food in your stomach may decrease your nausea  · Talk to your healthcare provider before you take over-the-counter (OTC) medicines  These medicines can cause serious problems if you use certain other medicines, or you have a medical condition  You may have problems if you use too much or use them for longer than the label says  Follow directions on the label carefully    Follow up with your healthcare provider as directed:  Write down your questions so you remember to ask them during your follow-up visits  © 2017 [facility] Information is for End User's use only and may not be sold, redistributed or otherwise used for commercial purposes  All illustrations and images included in CareNotes® are the copyrighted property of A D [employer] M [employer] Inc  or Roderick Kelley  The above information is an  only  It is not intended as medical advice for individual conditions or treatments  Talk to your doctor, nurse or pharmacist before following any medical regimen to see if it is safe and effective for you  Biliary Colic   WHAT YOU NEED TO KNOW:   Biliary colic is severe pain in your upper abdomen caused by a gallbladder problem  Your gallbladder stores bile, which helps break down the fats that you eat  DISCHARGE INSTRUCTIONS:   Medicines:   · Medicines  can help decrease pain and muscle spasms  You may also need medicine to calm your stomach and stop vomiting  · Take your medicine as directed  Contact your healthcare provider if you think your medicine is not helping or you have side effects  Tell him if you are allergic to any medicine  Keep a list of the medicines, vitamins, and herbs you take  Include the amounts, and when and why you take them  Bring the list or the pill bottles to follow-up visits  Carry your medicine list with you in case of an emergency  Avoid alcohol:  Alcohol can damage your gallbladder and make your symptoms worse  Maintain a healthy weight:  Ask your healthcare provider how much you should weigh  Ask him to help you create a weight loss plan if you are overweight  Eat a variety of healthy foods:  Healthy foods include fruits, vegetables, whole-grain breads, low-fat dairy products, beans, lean meats, and fish  Foods that are high in fiber and low in fat and cholesterol may decrease your symptoms  Ask if you need to be on a special diet  Exercise:  Ask your healthcare provider about the best exercise plan for you   Exercise may help improve your symptoms  Follow up with your healthcare provider as directed:  Bring a list of any questions you have so you remember to ask them during your visits  Contact your healthcare provider if:   · You have a fever  · Your pain gets worse, even after you take medicine  · You have nausea or are vomiting  · Your skin or eyes are yellow  · You have questions or concerns about your condition or care  Return to the emergency department if:   · You have severe pain  · You feel like you are going to faint  · You are short of breath  © 2017 [employer]  Information is for End User's use only and may not be sold, redistributed or otherwise used for commercial purposes  All illustrations and images included in CareNotes® are the copyrighted property of [employer]  or Roderick Kelley  The above information is an  only  It is not intended as medical advice for individual conditions or treatments  Talk to your doctor, nurse or pharmacist before following any medical regimen to see if it is safe and effective for you  Gallstones   WHAT YOU NEED TO KNOW:   Gallstones, also called cholelithiasis, are hard substances that form in your gallbladder or bile duct  Your gallbladder and bile duct are located on the right side of your abdomen, near your liver  Your gallbladder stores bile, which helps break down the fat that you eat  Your gallbladder also helps remove certain chemicals from your body  DISCHARGE INSTRUCTIONS:   Medicines:   · Prescription pain medicine  may be given  Ask your healthcare provider how to take this medicine safely  · Take your medicine as directed  Contact your healthcare provider if you think your medicine is not helping or if you have side effects  Tell him if you are allergic to any medicine  Keep a list of the medicines, vitamins, and herbs you take  Include the amounts, and when and why you take them   Bring the list or the pill bottles to follow-up visits  Carry your medicine list with you in case of an emergency  Follow up with your healthcare provider as directed:  Write down your questions so you remember to ask them during your visits  Eat a variety of healthy foods: This may help you have more energy and heal faster  Healthy foods include fruit, vegetables, whole-grain breads, low-fat dairy products, beans, lean meat, and fish  Ask if you need to be on a special diet  Exercise as directed:  Talk to your healthcare provider about the best exercise plan for you  Exercise can help you lose weight and improve your health  Contact your healthcare provider if:   · You have nausea and are vomiting  · Your urine is dark  · You have falguni-colored bowel movements  · You have questions or concerns about your condition or care  Return to the emergency department if:   · You have a fever and chills  · Your skin or eyes turn yellow  · You have severe pain in your upper abdomen, just below the right ribcage  © 2017 [employer]  Information is for End User's use only and may not be sold, redistributed or otherwise used for commercial purposes  All illustrations and images included in CareNotes® are the copyrighted property of [email or web address]  or Roderick Kelley  The above information is an  only  It is not intended as medical advice for individual conditions or treatments  Talk to your doctor, nurse or pharmacist before following any medical regimen to see if it is safe and effective for you

## 2019-01-20 LAB
ATRIAL RATE: 54 BPM
P AXIS: 42 DEGREES
PR INTERVAL: 176 MS
QRS AXIS: 76 DEGREES
QRSD INTERVAL: 76 MS
QT INTERVAL: 440 MS
QTC INTERVAL: 417 MS
T WAVE AXIS: 54 DEGREES
VENTRICULAR RATE: 54 BPM

## 2019-01-20 PROCEDURE — 93010 ELECTROCARDIOGRAM REPORT: CPT | Performed by: INTERNAL MEDICINE

## 2019-01-30 NOTE — PRE-PROCEDURE INSTRUCTIONS
Pre-Surgery Instructions:   Medication Instructions    fluticasone (FLONASE) 50 mcg/act nasal spray Instructed patient per Anesthesia Guidelines   fluticasone-vilanterol (BREO ELLIPTA) 100-25 mcg/inh inhaler Instructed patient per Anesthesia Guidelines   JUNEL FE 1/20 1-20 MG-MCG per tablet Instructed patient per Anesthesia Guidelines   multivitamin [facility]) TABS Patient was instructed by Physician and understands   ranitidine (ZANTAC) 75 MG tablet Instructed patient per Anesthesia Guidelines  Pre op and bathing instructions reviewed   Pt has hibiclens

## 2019-02-03 ENCOUNTER — ANESTHESIA EVENT (OUTPATIENT)
Dept: PERIOP | Facility: HOSPITAL | Age: 34
End: 2019-02-03
Payer: COMMERCIAL

## 2019-02-04 ENCOUNTER — ANESTHESIA (OUTPATIENT)
Dept: PERIOP | Facility: HOSPITAL | Age: 34
End: 2019-02-04
Payer: COMMERCIAL

## 2019-02-04 ENCOUNTER — HOSPITAL ENCOUNTER (OUTPATIENT)
Facility: HOSPITAL | Age: 34
Setting detail: OUTPATIENT SURGERY
Discharge: HOME | End: 2019-02-04
Attending: SURGERY | Admitting: SURGERY
Payer: COMMERCIAL

## 2019-02-04 VITALS
HEART RATE: 82 BPM | TEMPERATURE: 97.4 F | OXYGEN SATURATION: 100 % | BODY MASS INDEX: 34.53 KG/M2 | SYSTOLIC BLOOD PRESSURE: 123 MMHG | HEIGHT: 67 IN | WEIGHT: 220 LBS | DIASTOLIC BLOOD PRESSURE: 61 MMHG | RESPIRATION RATE: 18 BRPM

## 2019-02-04 DIAGNOSIS — K80.10 CALCULUS OF GALLBLADDER WITH CHRONIC CHOLECYSTITIS WITHOUT OBSTRUCTION: ICD-10-CM

## 2019-02-04 LAB — EXT PREGNANCY TEST URINE: NEGATIVE

## 2019-02-04 PROCEDURE — 81025 URINE PREGNANCY TEST: CPT | Performed by: SURGERY

## 2019-02-04 PROCEDURE — 88304 TISSUE EXAM BY PATHOLOGIST: CPT | Performed by: PATHOLOGY

## 2019-02-04 RX ORDER — KETOROLAC TROMETHAMINE 30 MG/ML
INJECTION, SOLUTION INTRAMUSCULAR; INTRAVENOUS AS NEEDED
Status: DISCONTINUED | OUTPATIENT
Start: 2019-02-04 | End: 2019-02-04 | Stop reason: SURG

## 2019-02-04 RX ORDER — GLYCOPYRROLATE 0.2 MG/ML
INJECTION INTRAMUSCULAR; INTRAVENOUS AS NEEDED
Status: DISCONTINUED | OUTPATIENT
Start: 2019-02-04 | End: 2019-02-04 | Stop reason: SURG

## 2019-02-04 RX ORDER — ROCURONIUM BROMIDE 10 MG/ML
INJECTION, SOLUTION INTRAVENOUS AS NEEDED
Status: DISCONTINUED | OUTPATIENT
Start: 2019-02-04 | End: 2019-02-04 | Stop reason: SURG

## 2019-02-04 RX ORDER — HYDROMORPHONE HYDROCHLORIDE 2 MG/ML
INJECTION, SOLUTION INTRAMUSCULAR; INTRAVENOUS; SUBCUTANEOUS AS NEEDED
Status: DISCONTINUED | OUTPATIENT
Start: 2019-02-04 | End: 2019-02-04 | Stop reason: SURG

## 2019-02-04 RX ORDER — FENTANYL CITRATE 50 UG/ML
INJECTION, SOLUTION INTRAMUSCULAR; INTRAVENOUS AS NEEDED
Status: DISCONTINUED | OUTPATIENT
Start: 2019-02-04 | End: 2019-02-04 | Stop reason: SURG

## 2019-02-04 RX ORDER — PROPOFOL 10 MG/ML
INJECTION, EMULSION INTRAVENOUS AS NEEDED
Status: DISCONTINUED | OUTPATIENT
Start: 2019-02-04 | End: 2019-02-04 | Stop reason: SURG

## 2019-02-04 RX ORDER — SODIUM CHLORIDE 9 MG/ML
INJECTION, SOLUTION INTRAVENOUS AS NEEDED
Status: DISCONTINUED | OUTPATIENT
Start: 2019-02-04 | End: 2019-02-04 | Stop reason: HOSPADM

## 2019-02-04 RX ORDER — EPHEDRINE SULFATE 50 MG/ML
INJECTION, SOLUTION INTRAVENOUS AS NEEDED
Status: DISCONTINUED | OUTPATIENT
Start: 2019-02-04 | End: 2019-02-04 | Stop reason: SURG

## 2019-02-04 RX ORDER — OXYCODONE HYDROCHLORIDE AND ACETAMINOPHEN 5; 325 MG/1; MG/1
2 TABLET ORAL EVERY 4 HOURS PRN
Qty: 20 TABLET | Refills: 0 | Status: SHIPPED | OUTPATIENT
Start: 2019-02-04 | End: 2019-08-12

## 2019-02-04 RX ORDER — OXYCODONE HYDROCHLORIDE AND ACETAMINOPHEN 5; 325 MG/1; MG/1
2 TABLET ORAL EVERY 4 HOURS PRN
Qty: 20 TABLET | Refills: 0 | Status: SHIPPED | OUTPATIENT
Start: 2019-02-04 | End: 2019-08-12 | Stop reason: SINTOL

## 2019-02-04 RX ORDER — MIDAZOLAM HYDROCHLORIDE 1 MG/ML
INJECTION INTRAMUSCULAR; INTRAVENOUS AS NEEDED
Status: DISCONTINUED | OUTPATIENT
Start: 2019-02-04 | End: 2019-02-04 | Stop reason: SURG

## 2019-02-04 RX ORDER — NEOSTIGMINE METHYLSULFATE 1 MG/ML
INJECTION INTRAVENOUS AS NEEDED
Status: DISCONTINUED | OUTPATIENT
Start: 2019-02-04 | End: 2019-02-04 | Stop reason: SURG

## 2019-02-04 RX ORDER — ONDANSETRON 2 MG/ML
INJECTION INTRAMUSCULAR; INTRAVENOUS AS NEEDED
Status: DISCONTINUED | OUTPATIENT
Start: 2019-02-04 | End: 2019-02-04 | Stop reason: SURG

## 2019-02-04 RX ORDER — ONDANSETRON 2 MG/ML
4 INJECTION INTRAMUSCULAR; INTRAVENOUS ONCE AS NEEDED
Status: DISCONTINUED | OUTPATIENT
Start: 2019-02-04 | End: 2019-02-04 | Stop reason: HOSPADM

## 2019-02-04 RX ORDER — BUPIVACAINE HYDROCHLORIDE AND EPINEPHRINE 5; 5 MG/ML; UG/ML
INJECTION, SOLUTION EPIDURAL; INTRACAUDAL; PERINEURAL AS NEEDED
Status: DISCONTINUED | OUTPATIENT
Start: 2019-02-04 | End: 2019-02-04 | Stop reason: HOSPADM

## 2019-02-04 RX ORDER — SODIUM CHLORIDE, SODIUM LACTATE, POTASSIUM CHLORIDE, CALCIUM CHLORIDE 600; 310; 30; 20 MG/100ML; MG/100ML; MG/100ML; MG/100ML
125 INJECTION, SOLUTION INTRAVENOUS CONTINUOUS
Status: DISCONTINUED | OUTPATIENT
Start: 2019-02-04 | End: 2019-02-04 | Stop reason: HOSPADM

## 2019-02-04 RX ORDER — OXYCODONE HYDROCHLORIDE AND ACETAMINOPHEN 5; 325 MG/1; MG/1
2 TABLET ORAL EVERY 4 HOURS PRN
Status: DISCONTINUED | OUTPATIENT
Start: 2019-02-04 | End: 2019-02-04 | Stop reason: HOSPADM

## 2019-02-04 RX ORDER — ALBUTEROL SULFATE 2.5 MG/3ML
2.5 SOLUTION RESPIRATORY (INHALATION) ONCE
Status: COMPLETED | OUTPATIENT
Start: 2019-02-04 | End: 2019-02-04

## 2019-02-04 RX ORDER — CEFAZOLIN SODIUM 2 G/50ML
SOLUTION INTRAVENOUS AS NEEDED
Status: DISCONTINUED | OUTPATIENT
Start: 2019-02-04 | End: 2019-02-04 | Stop reason: SURG

## 2019-02-04 RX ORDER — PROMETHAZINE HYDROCHLORIDE 25 MG/ML
12.5 INJECTION, SOLUTION INTRAMUSCULAR; INTRAVENOUS ONCE
Status: COMPLETED | OUTPATIENT
Start: 2019-02-04 | End: 2019-02-04

## 2019-02-04 RX ORDER — MORPHINE SULFATE 10 MG/ML
4 INJECTION, SOLUTION INTRAMUSCULAR; INTRAVENOUS
Status: DISCONTINUED | OUTPATIENT
Start: 2019-02-04 | End: 2019-02-04 | Stop reason: HOSPADM

## 2019-02-04 RX ORDER — HYDROMORPHONE HCL/PF 1 MG/ML
0.2 SYRINGE (ML) INJECTION
Status: DISCONTINUED | OUTPATIENT
Start: 2019-02-04 | End: 2019-02-04 | Stop reason: HOSPADM

## 2019-02-04 RX ORDER — ONDANSETRON 2 MG/ML
4 INJECTION INTRAMUSCULAR; INTRAVENOUS EVERY 4 HOURS PRN
Status: DISCONTINUED | OUTPATIENT
Start: 2019-02-04 | End: 2019-02-04 | Stop reason: HOSPADM

## 2019-02-04 RX ORDER — SODIUM CHLORIDE 9 MG/ML
INJECTION, SOLUTION INTRAVENOUS CONTINUOUS PRN
Status: DISCONTINUED | OUTPATIENT
Start: 2019-02-04 | End: 2019-02-04 | Stop reason: SURG

## 2019-02-04 RX ORDER — LIDOCAINE HYDROCHLORIDE 10 MG/ML
INJECTION, SOLUTION INFILTRATION; PERINEURAL AS NEEDED
Status: DISCONTINUED | OUTPATIENT
Start: 2019-02-04 | End: 2019-02-04 | Stop reason: SURG

## 2019-02-04 RX ORDER — FENTANYL CITRATE/PF 50 MCG/ML
25 SYRINGE (ML) INJECTION
Status: DISCONTINUED | OUTPATIENT
Start: 2019-02-04 | End: 2019-02-04 | Stop reason: HOSPADM

## 2019-02-04 RX ORDER — SODIUM CHLORIDE 9 MG/ML
100 INJECTION, SOLUTION INTRAVENOUS CONTINUOUS
Status: DISCONTINUED | OUTPATIENT
Start: 2019-02-04 | End: 2019-02-04 | Stop reason: HOSPADM

## 2019-02-04 RX ADMIN — CEFAZOLIN SODIUM 2000 MG: 2 SOLUTION INTRAVENOUS at 13:40

## 2019-02-04 RX ADMIN — EPHEDRINE SULFATE 10 MG: 50 INJECTION, SOLUTION INTRAMUSCULAR; INTRAVENOUS; SUBCUTANEOUS at 14:06

## 2019-02-04 RX ADMIN — HYDROMORPHONE HYDROCHLORIDE 0.5 MG: 2 INJECTION, SOLUTION INTRAMUSCULAR; INTRAVENOUS; SUBCUTANEOUS at 14:20

## 2019-02-04 RX ADMIN — NEOSTIGMINE METHYLSULFATE 2 MG: 1 INJECTION INTRAVENOUS at 14:29

## 2019-02-04 RX ADMIN — PROMETHAZINE HYDROCHLORIDE 12.5 MG: 25 INJECTION INTRAMUSCULAR; INTRAVENOUS at 16:33

## 2019-02-04 RX ADMIN — ROCURONIUM BROMIDE 25 MG: 10 INJECTION INTRAVENOUS at 13:49

## 2019-02-04 RX ADMIN — GLYCOPYRROLATE 0.4 MG: 0.2 INJECTION, SOLUTION INTRAMUSCULAR; INTRAVENOUS at 14:29

## 2019-02-04 RX ADMIN — ONDANSETRON 4 MG: 2 INJECTION INTRAMUSCULAR; INTRAVENOUS at 14:56

## 2019-02-04 RX ADMIN — LIDOCAINE HYDROCHLORIDE ANHYDROUS 50 MG: 10 INJECTION, SOLUTION INFILTRATION at 13:49

## 2019-02-04 RX ADMIN — MIDAZOLAM HYDROCHLORIDE 2 MG: 1 INJECTION, SOLUTION INTRAMUSCULAR; INTRAVENOUS at 13:43

## 2019-02-04 RX ADMIN — FENTANYL CITRATE 50 MCG: 50 INJECTION INTRAMUSCULAR; INTRAVENOUS at 13:52

## 2019-02-04 RX ADMIN — ALBUTEROL SULFATE 2.5 MG: 2.5 SOLUTION RESPIRATORY (INHALATION) at 15:09

## 2019-02-04 RX ADMIN — KETOROLAC TROMETHAMINE 30 MG: 30 INJECTION, SOLUTION INTRAMUSCULAR at 14:21

## 2019-02-04 RX ADMIN — FENTANYL CITRATE 25 MCG: 50 INJECTION, SOLUTION INTRAMUSCULAR; INTRAVENOUS at 15:22

## 2019-02-04 RX ADMIN — SODIUM CHLORIDE: 0.9 INJECTION, SOLUTION INTRAVENOUS at 12:18

## 2019-02-04 RX ADMIN — FENTANYL CITRATE 50 MCG: 50 INJECTION INTRAMUSCULAR; INTRAVENOUS at 13:49

## 2019-02-04 RX ADMIN — ONDANSETRON 4 MG: 2 INJECTION INTRAMUSCULAR; INTRAVENOUS at 13:49

## 2019-02-04 RX ADMIN — FENTANYL CITRATE 25 MCG: 50 INJECTION, SOLUTION INTRAMUSCULAR; INTRAVENOUS at 15:18

## 2019-02-04 RX ADMIN — DEXAMETHASONE SODIUM PHOSPHATE 4 MG: 10 INJECTION INTRAMUSCULAR; INTRAVENOUS at 13:49

## 2019-02-04 RX ADMIN — PROPOFOL 200 MG: 10 INJECTION, EMULSION INTRAVENOUS at 13:49

## 2019-02-04 NOTE — DISCHARGE INSTRUCTIONS
Laparoscopic Cholecystectomy    WHAT YOU SHOULD KNOW:    Cholecystitis is inflammation of your gallbladder  Your gallbladder stores bile, which helps break down the fat that you eat  It also helps remove certain chemicals from your body  You may have a sudden, severe attack (acute cholecystitis) or several mild attacks (chronic cholecystitis)  Laparoscopic cholecystectomy is surgery to remove your gallbladder  During this surgery, small incisions are made in your abdomen  A small scope and special tools are inserted through these incisions  Your gallbladder is removed from it normal location and taken out of your abdomen  The incisions are closed with sutures and a small amount of glue is applied over top incisions to help reinforce the incisions to optimize healing          AFTER YOU LEAVE:  Following discharge from the hospital, you may have some questions about your procedure, your activities or your general condition  These instructions may answer some of your questions and help you adjust during the first few days following your operation  You can expect to be sore and tender mostly around the incisions  This pain should last approximally 5 days and gradually improve daily  Incisions: Your doctor may have chosen to use a type of adhesive glue, to close your incision  The glue is used to cover the incision, assist in closure, and prevent contamination so to optimize healing  This adhesive glue will darken and may appear as a purple film over the incision  Do not pick at the glue, it will peel away on its own within one to two weeks  You may apply ice to the incisions to help with pain  Avoid heat as this my make the glue tacky   It is normal to have some bruising, swelling or mild discoloration around the incision  If increasing redness or pain develops, call our office immediately  You may wash the incision gently with soap and water then pat dry  Do not apply any creams or ointments  Dressings: You do not usually need to keep incisions covered with a dressing  A dressing is only required if you had a drain following your surgery and the drain was removed prior to discharge  If a dressing is required the doctor will discuss the dressing with prior to leaving  You may remove the dressing for showering, but reapply when dry  Bathing: You may shower daily with soap and water the day after the procedure  It is OK to GENTLY wash the incision with soap and water then pat dry  Do NOT soak incision in a tub, pool, or hot tub for 2 weeks  Diet: Eat low-fat foods for 4 to 6 weeks while your body learns to digest fat without a gallbladder  Slowly increase the amount of fat that you eat  We recommend you slowly advance your diet  Try to start with softer bland foods and gradually advance as tolerated  Be sure to consume plenty of water  Avoid alcohol  Activity/Restrictions: The evening following the procedure you should rest as much as possible, sitting, lying or reclining  you should be sure someone remains with you until the next morning  Gradually increase your activity daily  Walking 3-4 daily is good and  stairs are ok  Listen to your body  If you start to get tired or sore then rest    No strenuous activity or exercise for 3-4 weeks  No driving for 5 days or while taking narcotics for pain  Return or work: You may return to work or other activities as soon as your pain is controlled and you feel comfortable  For many people, this is 5 to 7 days after surgery  If your job requires heavy lifting you will need to be on light duty for 2-3 weeks  Follow up appointment: following discharge from the hospital call the office in 1-2 days to set up a post operative appointment to be seen in 2-3 weeks  The phone number and address should be provided in your discharge paper work  Medication: Please take all medications as prescribed   Call your healthcare provider if you think your medicine is not helping or if you have side effects  If you were given a prescription for Percocet, Norco, or Vicodin for pain be sure to eat prior to taking as these medications as they may cause nausea and vomiting on an empty stomach  DO NOT take Tylenol with these medication for a fever or for further pain control as these medications already contain Tylenol in them       Contact your healthcare provider if:   · You have a fever over 101°F (38°C) or chills  · You have pain or nausea that is not relieved by medicine  · You have redness and swelling around your incisions, or blood or pus is leaking from your incisions  · You are constipated or have diarrhea  · Your skin or eyes are yellow, or your bowel movements are pale  · You have questions or concerns about your surgery, condition, or care  Seek care immediately or call 911 if:   · You cannot stop vomiting  · Your bowel movements are black or bloody  · You have pain in your abdomen and it is swollen or hard  · Your arm or leg feels warm, tender, and painful  It may look swollen and red  · You feel lightheaded, short of breath, and have chest pain  · You cough up blood

## 2019-02-04 NOTE — OP NOTE
OPERATIVE REPORT  PATIENT NAME: Adelina Cottrell    :  1985  MRN: [id number]  Pt Location: [facility] OR ROOM 02    SURGERY DATE: 2019    Surgeon(s) and Role:     * Jose Singletary DO - Primary     * Tony Spear MD - Assisting    Preop Diagnosis:  Calculus of gallbladder with chronic cholecystitis without obstruction [K80 10]    Post-Op Diagnosis Codes:     * Calculus of gallbladder with chronic cholecystitis without obstruction [K80 10]    Procedure(s) (LRB):  CHOLECYSTECTOMY LAPAROSCOPIC (N/A)    Specimen(s):  ID Type Source Tests Collected by Time Destination   1 : Gallbladder- CC PCP/refering MD Moreno Null Tissue Gallbladder TISSUE EXAM Jose Singletary DO 2019 1400        Estimated Blood Loss:   Minimal    Drains:       Anesthesia Type:   General/local    Operative Indications:  Calculus of gallbladder with chronic cholecystitis without obstruction [K80 10]      Operative Findings:  Chronic calculous cholecystitis    Review of Systems/Medical History      Cardiovascular Pulmonary  Asthma ,       GI/Hepatic          Endo/Other  Obesity     GYN      Hematology Musculoskeletal      Neurology Psychology          Height 67 in weight 100 kg/220 lb  BMI 34  ASA 1  Wound class 2    Complications:   None    Procedure and Technique:    Patient was brought into the operative suite and identified by visualization conversation and by arm band  Athrombotic pumps were placed  Patient was given preoperative antibiotics  After being placed under general anesthesia the abdomen was prepped and draped in a sterile fashion  A timeout was performed and it was assured that the prep was dry  Local was then instilled at the the umbilicus  A small vertical skin incision was made in the area of the local  Two Kocher clamps were used to bluntly dissect down to the fascia  The fascia was lifted up and divided in the midline   I then poked through the underlying peritoneum with a hemostat gaining access into the abdominal cavity  An 11 mm trocar was placed under direct visualization  Pneumoperitoneum was then established with CO2  5 mm trochars were then placed in the right upper quadrant under direct visualization  The gallbladder was then lifted cephalad  The cystic duct and cystic artery were carefully dissected out  Both duct and artery were carefully hemoclipped and divided  Gallbladder is then taken off the liver using the Harmonic scalpel  Gallbladder was then brought out through the umbilical port site  Remaining trochars were removed releasing the pneumoperitoneum  Fascia at the umbilical site was closed using 0 Vicryl in a figure-of-eight fashion  Copious irrigation was carried out of all trocar sites  More local was instilled and the remaining trocar sites  Skin incisions were closed using 4-0 Monocryl and a subsequent care fashion  All wounds were then washed and dried  Sterile Histacryl was then applied   The patient was awakened in the operating room and returned to the recovery room in stable condition   I was present for the entire procedure    Patient Disposition:  PACU     SIGNATURE: Merna Yen DO  DATE: February 4, 2019  TIME: 2:27 PM

## 2019-02-04 NOTE — ANESTHESIA POSTPROCEDURE EVALUATION
Post-Op Assessment Note      CV Status:  Stable    Mental Status:  Alert and awake    Hydration Status:  Euvolemic    PONV Controlled:  Controlled    Airway Patency:  Patent    Post Op Vitals Reviewed: Yes          Staff: CRNA       Comments: vss report rn          BP      Temp      Pulse     Resp      SpO2

## 2019-02-04 NOTE — PROGRESS NOTES
Pt c/o difficulty taking deep breath, exp wheeze noted L side, amie wilkerson, Dr Severo Birkenhead returned page, ordered albuterol neb

## 2019-02-04 NOTE — ANESTHESIA PREPROCEDURE EVALUATION
Review of Systems/Medical History          Cardiovascular   Pulmonary  Asthma ,        GI/Hepatic            Endo/Other    Obesity    GYN       Hematology   Musculoskeletal       Neurology   Psychology           Physical Exam    Airway    Mallampati score: II         Dental   No notable dental hx     Cardiovascular      Pulmonary      Other Findings        Anesthesia Plan  ASA Score- 2     Anesthesia Type- general with ASA Monitors  Additional Monitors:   Airway Plan: ETT  Comment: I, Dr Nena Samano, the attending physician, have personally seen and evaluated the patient prior to anesthetic care  I have reviewed the pre-anesthetic record, and other medical records if appropriate to the anesthetic care  If a CRNA is involved in the case, I have reviewed the CRNA assessment, if present, and agree  The patient is in a suitable condition to proceed with my formulated anesthetic plan        Plan Factors-    Induction- intravenous  Postoperative Plan-     Informed Consent- Anesthetic plan and risks discussed with patient  I personally reviewed this patient with the CRNA  Discussed and agreed on the Anesthesia Plan with the CRNA Gerhardt Sep

## 2019-02-13 ENCOUNTER — TELEPHONE (OUTPATIENT)
Dept: INTERNAL MEDICINE CLINIC | Facility: CLINIC | Age: 34
End: 2019-02-13

## 2019-04-24 ENCOUNTER — ANNUAL EXAM (OUTPATIENT)
Dept: OBGYN CLINIC | Facility: CLINIC | Age: 34
End: 2019-04-24
Payer: COMMERCIAL

## 2019-04-24 VITALS
SYSTOLIC BLOOD PRESSURE: 122 MMHG | BODY MASS INDEX: 32.89 KG/M2 | WEIGHT: 217 LBS | DIASTOLIC BLOOD PRESSURE: 80 MMHG | HEIGHT: 68 IN

## 2019-04-24 DIAGNOSIS — Z01.419 ENCOUNTER FOR WELL WOMAN EXAM: Primary | ICD-10-CM

## 2019-04-24 DIAGNOSIS — IMO0001 BIRTH CONTROL: ICD-10-CM

## 2019-04-24 PROCEDURE — 99395 PREV VISIT EST AGE 18-39: CPT | Performed by: PHYSICIAN ASSISTANT

## 2019-04-24 RX ORDER — IBUPROFEN 200 MG
TABLET ORAL EVERY 6 HOURS PRN
COMMUNITY
End: 2020-11-04 | Stop reason: ALTCHOICE

## 2019-04-24 RX ORDER — NORETHINDRONE ACETATE AND ETHINYL ESTRADIOL 1MG-20(21)
1 KIT ORAL DAILY
Qty: 90 TABLET | Refills: 4 | Status: SHIPPED | OUTPATIENT
Start: 2019-04-24 | End: 2020-11-04 | Stop reason: ALTCHOICE

## 2019-07-31 ENCOUNTER — OFFICE VISIT (OUTPATIENT)
Dept: OBGYN CLINIC | Facility: CLINIC | Age: 34
End: 2019-07-31
Payer: COMMERCIAL

## 2019-07-31 VITALS
WEIGHT: 217 LBS | DIASTOLIC BLOOD PRESSURE: 82 MMHG | HEIGHT: 68 IN | BODY MASS INDEX: 32.89 KG/M2 | SYSTOLIC BLOOD PRESSURE: 118 MMHG

## 2019-07-31 DIAGNOSIS — N76.0 ACUTE VAGINITIS: Primary | ICD-10-CM

## 2019-07-31 DIAGNOSIS — Z11.3 SCREENING EXAMINATION FOR STD (SEXUALLY TRANSMITTED DISEASE): ICD-10-CM

## 2019-07-31 DIAGNOSIS — N94.3 PMS (PREMENSTRUAL SYNDROME): ICD-10-CM

## 2019-07-31 DIAGNOSIS — N89.8 VAGINAL DISCHARGE: ICD-10-CM

## 2019-07-31 PROCEDURE — 99214 OFFICE O/P EST MOD 30 MIN: CPT | Performed by: PHYSICIAN ASSISTANT

## 2019-07-31 NOTE — PROGRESS NOTES
Assessment/Plan:    No problem-specific Assessment & Plan notes found for this encounter  Diagnoses and all orders for this visit:    Acute vaginitis  -     GP Vaginitis/Vaginosis W/O PAP W/O HPV  Expanded  -     GP Cervicitis W/O PAP W/O HPV PLUS  -     GP Ureaplasma By Multiplex PCR    Vaginal discharge  -     GP Vaginitis/Vaginosis W/O PAP W/O HPV  Expanded  -     GP Cervicitis W/O PAP W/O HPV PLUS  -     GP Ureaplasma By Multiplex PCR    Screening examination for STD (sexually transmitted disease)  -     GP Cervicitis W/O PAP W/O HPV PLUS  -     GP Ureaplasma By Multiplex PCR    PMS (premenstrual syndrome)          Subjective:      Patient ID: Felicity Reyes is a 37 y o  female  Pt presents for a problem visit  Missed 2 pills this past month, 1 week apart  Noticed she was very hurst  Does tend to get hurst the week before her period always  Periods are regular, short and light  Had some nausea and abdominal pain days of missed pills  Some increase in yellow discharge too  No itch or burn  occ insertional pain w intercourse    Culture done  Add b complex  Add d and probiotic  Coconut oil and sarafem discussed as well      The following portions of the patient's history were reviewed and updated as appropriate: allergies, current medications, past family history, past medical history, past social history, past surgical history and problem list     Review of Systems   Constitutional: Negative for chills, fever and unexpected weight change  Gastrointestinal: Negative for abdominal pain, blood in stool, constipation and diarrhea  Genitourinary: Positive for vaginal discharge  Objective:      /82 (BP Location: Right arm, Patient Position: Sitting, Cuff Size: Standard)   Ht 5' 8" (1 727 m)   Wt 98 4 kg (217 lb)   LMP 07/04/2019 (Exact Date)   Breastfeeding? No   BMI 32 99 kg/m²          Physical Exam   Constitutional: She appears well-developed and well-nourished     Genitourinary: Vagina normal  Pelvic exam was performed with patient supine  There is no rash or lesion on the right labia  There is no rash or lesion on the left labia  Cervix exhibits no motion tenderness, no discharge and no friability  Lymphadenopathy: No inguinal adenopathy noted on the right or left side  Nursing note and vitals reviewed

## 2019-07-31 NOTE — PROGRESS NOTES
Patient is here to discuss other birth control options  She is currently using Junel Fe for birth control  In June patient missed a pill one week and then also the next week  After realizing she missed a pill she was feeling "off", had some cramping, and nausea  Her period also came about 2 days early  Currently she is still "feeling off" and has complaints of yellow discharge, no itching, burning or pain

## 2019-07-31 NOTE — PATIENT INSTRUCTIONS
Will check culture and call pt with results--please call office if no word from us within 1 week  Change all products (ie soaps/body washes/laundry detergents etc) to unscented, hypoallergenic, for sensitive skin  Increase water/yogurt/probiotics/cranberry tablets daily  Change out of wet bathing suits and sweaty clothes ASAP  Witch hazel/hydrocortisone externally PRN for itching and burning

## 2019-08-05 LAB
A VAGINAE DNA VAG NAA+PROBE-LOG#: <3.25
A VAGINAE DNA VAG QL NAA+PROBE: NOT DETECTED
BVAB2 DNA VAG QL NAA+PROBE: NOT DETECTED
C TRACH DNA SPEC QL PROBE+SIG AMP: NOT DETECTED
G VAGINALIS DNA SPEC QL NAA+PROBE: NOT DETECTED
G VAGINALIS DNA VAG NAA+PROBE-LOG#: <3.25
HSV1 DNA SPEC QL NAA+PROBE: NOT DETECTED
HSV2 DNA SPEC QL NAA+PROBE: NOT DETECTED
LACTOBACILLUS DNA VAG NAA+PROBE-LOG#: DETECTED
LACTOBACILLUS DNA VAG NAA+PROBE-LOG#: NORMAL
M GENITALIUM DNA SPEC QL NAA+PROBE: NOT DETECTED
MEGA1 DNA VAG QL NAA+PROBE: NOT DETECTED
N GONORRHOEA DNA SPEC QL NAA+PROBE: NOT DETECTED
SL AMB C.ALBICANS BY PCR: NOT DETECTED
SL AMB CANDIDA GENUS: NOT DETECTED
T VAGINALIS RRNA SPEC QL NAA+PROBE: NOT DETECTED
T VAGINALIS RRNA SPEC QL NAA+PROBE: NOT DETECTED
U UREALYTICUM DNA SPEC QL NAA+PROBE: NOT DETECTED

## 2019-08-12 ENCOUNTER — OFFICE VISIT (OUTPATIENT)
Dept: INTERNAL MEDICINE CLINIC | Age: 34
End: 2019-08-12
Payer: COMMERCIAL

## 2019-08-12 VITALS
TEMPERATURE: 97.7 F | HEIGHT: 67 IN | BODY MASS INDEX: 34.94 KG/M2 | DIASTOLIC BLOOD PRESSURE: 68 MMHG | OXYGEN SATURATION: 98 % | WEIGHT: 222.6 LBS | SYSTOLIC BLOOD PRESSURE: 100 MMHG | HEART RATE: 78 BPM

## 2019-08-12 DIAGNOSIS — R51.9 INTRACTABLE HEADACHE, UNSPECIFIED CHRONICITY PATTERN, UNSPECIFIED HEADACHE TYPE: Primary | ICD-10-CM

## 2019-08-12 DIAGNOSIS — M62.838 CERVICAL PARASPINAL MUSCLE SPASM: ICD-10-CM

## 2019-08-12 DIAGNOSIS — Q07.01 CHIARI MALFORMATION TYPE II (HCC): ICD-10-CM

## 2019-08-12 PROCEDURE — 3008F BODY MASS INDEX DOCD: CPT | Performed by: INTERNAL MEDICINE

## 2019-08-12 PROCEDURE — 1036F TOBACCO NON-USER: CPT | Performed by: INTERNAL MEDICINE

## 2019-08-12 PROCEDURE — 99214 OFFICE O/P EST MOD 30 MIN: CPT | Performed by: INTERNAL MEDICINE

## 2019-08-12 RX ORDER — CYCLOBENZAPRINE HCL 5 MG
5 TABLET ORAL
Qty: 10 TABLET | Refills: 0 | Status: SHIPPED | OUTPATIENT
Start: 2019-08-12 | End: 2020-11-04 | Stop reason: ALTCHOICE

## 2019-08-12 RX ORDER — TRAMADOL HYDROCHLORIDE 50 MG/1
50 TABLET ORAL EVERY 8 HOURS PRN
Qty: 21 TABLET | Refills: 0 | Status: SHIPPED | OUTPATIENT
Start: 2019-08-12 | End: 2020-11-04 | Stop reason: ALTCHOICE
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
## 2019-08-13 DIAGNOSIS — Q07.01 CHIARI MALFORMATION TYPE II (HCC): Primary | ICD-10-CM

## 2019-08-13 DIAGNOSIS — R51.9 INTRACTABLE HEADACHE, UNSPECIFIED CHRONICITY PATTERN, UNSPECIFIED HEADACHE TYPE: ICD-10-CM

## 2019-08-13 NOTE — PROGRESS NOTES
MRI brain and cervical spine ordered with the diagnosis of chiari type 2 malformation included in the order  I called and left a message for pt to tell radiology that there is a new order including all her diagnosis in the system when she goes for the study

## 2019-08-23 ENCOUNTER — HOSPITAL ENCOUNTER (OUTPATIENT)
Dept: RADIOLOGY | Age: 34
Discharge: HOME | End: 2019-08-23
Payer: COMMERCIAL

## 2019-08-23 DIAGNOSIS — R51.9 INTRACTABLE HEADACHE, UNSPECIFIED CHRONICITY PATTERN, UNSPECIFIED HEADACHE TYPE: ICD-10-CM

## 2019-08-23 PROCEDURE — 70551 MRI BRAIN STEM W/O DYE: CPT

## 2019-08-27 ENCOUNTER — TELEPHONE (OUTPATIENT)
Dept: INTERNAL MEDICINE CLINIC | Age: 34
End: 2019-08-27

## 2019-08-27 NOTE — TELEPHONE ENCOUNTER
Insurance approved the MRI of the Cervical Spine for this patient       Authorization is valid from 08/26/2019-11/24/2019    [address] [place] [address] and [facility] for the patient to schedule the appointment and to call us back to let us know

## 2019-08-28 ENCOUNTER — TELEPHONE (OUTPATIENT)
Dept: INTERNAL MEDICINE CLINIC | Facility: CLINIC | Age: 34
End: 2019-08-28

## 2019-09-18 ENCOUNTER — HOSPITAL ENCOUNTER (OUTPATIENT)
Dept: RADIOLOGY | Age: 34
Discharge: HOME | End: 2019-09-18
Payer: COMMERCIAL

## 2019-09-18 DIAGNOSIS — Q07.01 CHIARI MALFORMATION TYPE II (HCC): ICD-10-CM

## 2019-09-18 DIAGNOSIS — R51.9 INTRACTABLE HEADACHE, UNSPECIFIED CHRONICITY PATTERN, UNSPECIFIED HEADACHE TYPE: ICD-10-CM

## 2019-09-18 PROCEDURE — 72141 MRI NECK SPINE W/O DYE: CPT

## 2019-09-22 PROBLEM — G93.5 CHIARI MALFORMATION TYPE I (HCC): Status: ACTIVE | Noted: 2019-08-12

## 2019-09-22 NOTE — PROGRESS NOTES
Assessment/Plan:    Chiari malformation type 1 with intractable headache and neck pain  - will refer patient to neuro surgery  - continue with ibuprofen and Flexeril as needed    Obesity  - diet and exercise counseling    BMI Counseling: Body mass index is 35 37 kg/m²  The BMI is above normal  Nutrition recommendations include 3-5 servings of fruits/vegetables daily, reducing fast food intake and consuming healthier snacks  Diagnoses and all orders for this visit:    Chiari malformation type I [facility])  -     Ambulatory referral to Neurosurgery; Future    Intractable headache, unspecified chronicity pattern, unspecified headache type    Cervical paraspinal muscle spasm  -     Ambulatory referral to Neurosurgery; Future    Neck pain    Class 2 obesity due to excess calories without serious comorbidity with body mass index (BMI) of 35 0 to 35 9 in adult          Subjective:      Patient ID: Scott Teran is a 37 y o  female  HPI  Patient presents with her  for a follow-up visit regarding her intractable headache and neck pain  She states that she has been having intractable headache and neck pain since her last visit about a month ago  She also wants to review the results of her MRI head and cervical region  She still complains of headaches, neck pain which she describes as dull and grades it as 5/10 right now with a maximum of 9/10 once when she was at work  She states that she takes ibuprofen and Flexeril as needed  She states that she did not like the way tramadol made her feel so she stopped taking it  She also does not like the way she feels with  other analgesics like naproxen and Percocet  She also complains of 2 occasions of night sweats but denies muscle weakness, numbness, change in vision, difficulty swallowing, change in speech, fever, chills, chest pain, shortness of breath, palpitations, nausea, vomiting, abdominal pain, diarrhea, constipation    Patient states that her mother told her that when she was a baby, she had Chiari malformation type 2 but recent MRI head and cervical spine shows Chiari malformation type 1  The following portions of the patient's history were reviewed and updated as appropriate:   She  has a past medical history of ADHD (attention deficit hyperactivity disorder), Asthma, Chiari malformation type II ([facility] [address] ), Clostridium difficile infection, and Varicella  She   Patient Active Problem List    Diagnosis Date Noted    Class 2 obesity in adult 2019    Headache 2019    Cervical paraspinal muscle spasm 2019    Chiari malformation type I ([facility] [address] ) 2019    Neck pain 2018    Cholelithiasis 2018    RUQ pain 06/15/2018    Screening for metabolic disorder     Mild persistent asthma without complication     Allergic rhinitis 2018    Back pain 2018    Acute bronchitis 10/13/2017    Chronic cough 2015     She  has a past surgical history that includes Achilles tendon lengthening; pr  delivery only (N/A, 2016); Dixon tooth extraction; and pr lap,cholecystectomy (N/A, 2019)  Her family history includes ADD / ADHD in her brother; Alzheimer's disease in her father; Depression in her father; Diabetes in her paternal grandfather; Hearing loss in her paternal grandfather; Hypertension in her father and paternal grandfather; Hypothyroidism in her mother; Rheumatic fever in her mother  She  reports that she has never smoked  She has never used smokeless tobacco  She reports that she drinks alcohol  She reports that she does not use drugs    Current Outpatient Medications   Medication Sig Dispense Refill    cyclobenzaprine (FLEXERIL) 5 mg tablet Take 1 tablet (5 mg total) by mouth daily at bedtime (Patient taking differently: Take 5 mg by mouth daily at bedtime As needed) 10 tablet 0    fluticasone (FLONASE) 50 mcg/act nasal spray 1 spray into each nostril daily (Patient taking differently: 1 spray into each nostril as needed  ) 16 g 0    fluticasone-vilanterol (BREO ELLIPTA) 100-25 mcg/inh inhaler Inhale 1 puff daily Rinse mouth after use  (Patient taking differently: Inhale 1 puff as needed Rinse mouth after use  ) 2 Inhaler 0    ibuprofen (MOTRIN) 200 mg tablet Take by mouth every 6 (six) hours as needed for mild pain      multivitamin (THERAGRAN) TABS Take 1 tablet by mouth daily      norethindrone-ethinyl estradiol (JUNEL FE 1/20) 1-20 MG-MCG per tablet Take 1 tablet by mouth daily 90 tablet 4    ranitidine (ZANTAC) 75 MG tablet Take 75 mg by mouth as needed        traMADol (ULTRAM) 50 mg tablet Take 1 tablet (50 mg total) by mouth every 8 (eight) hours as needed for moderate pain 21 tablet 0    Fluoxetine HCl, PMDD, 10 MG CAPS Take 1 capsule (10 mg total) by mouth daily 30 capsule 5     No current facility-administered medications for this visit  Current Outpatient Medications on File Prior to Visit   Medication Sig    cyclobenzaprine (FLEXERIL) 5 mg tablet Take 1 tablet (5 mg total) by mouth daily at bedtime (Patient taking differently: Take 5 mg by mouth daily at bedtime As needed)    fluticasone (FLONASE) 50 mcg/act nasal spray 1 spray into each nostril daily (Patient taking differently: 1 spray into each nostril as needed  )    fluticasone-vilanterol (BREO ELLIPTA) 100-25 mcg/inh inhaler Inhale 1 puff daily Rinse mouth after use   (Patient taking differently: Inhale 1 puff as needed Rinse mouth after use  )    ibuprofen (MOTRIN) 200 mg tablet Take by mouth every 6 (six) hours as needed for mild pain    multivitamin (THERAGRAN) TABS Take 1 tablet by mouth daily    norethindrone-ethinyl estradiol (JUNEL FE 1/20) 1-20 MG-MCG per tablet Take 1 tablet by mouth daily    ranitidine (ZANTAC) 75 MG tablet Take 75 mg by mouth as needed      traMADol (ULTRAM) 50 mg tablet Take 1 tablet (50 mg total) by mouth every 8 (eight) hours as needed for moderate pain     No current facility-administered medications on file prior to visit  She is allergic to no active allergies       Review of Systems   Constitutional: Negative for activity change, chills, fatigue, fever and unexpected weight change  Occasional night sweats   HENT: Negative for ear pain, postnasal drip, rhinorrhea, sinus pressure and sore throat  Eyes: Negative for pain  Respiratory: Negative for cough, choking, chest tightness, shortness of breath and wheezing  Cardiovascular: Negative for chest pain, palpitations and leg swelling  Gastrointestinal: Negative for abdominal pain, constipation, diarrhea, nausea and vomiting  Genitourinary: Negative for dysuria and hematuria  Musculoskeletal: Positive for neck pain (Posterior neck pain)  Negative for arthralgias, back pain, gait problem, joint swelling, myalgias and neck stiffness  Skin: Negative for pallor and rash  Neurological: Positive for headaches  Negative for dizziness, tremors, seizures, syncope and light-headedness  Hematological: Negative for adenopathy  Psychiatric/Behavioral: Negative for behavioral problems  Past Medical History:   Diagnosis Date    ADHD (attention deficit hyperactivity disorder)     Asthma     sports induced    [facility]     Clostridium difficile infection     Varicella     childhood         Current Outpatient Medications:     cyclobenzaprine (FLEXERIL) 5 mg tablet, Take 1 tablet (5 mg total) by mouth daily at bedtime (Patient taking differently: Take 5 mg by mouth daily at bedtime As needed), Disp: 10 tablet, Rfl: 0    fluticasone (FLONASE) 50 mcg/act nasal spray, 1 spray into each nostril daily (Patient taking differently: 1 spray into each nostril as needed  ), Disp: 16 g, Rfl: 0    fluticasone-vilanterol (BREO ELLIPTA) 100-25 mcg/inh inhaler, Inhale 1 puff daily Rinse mouth after use   (Patient taking differently: Inhale 1 puff as needed Rinse mouth after use  ), Disp: 2 Inhaler, Rfl: 0    ibuprofen (MOTRIN) 200 mg tablet, Take by mouth every 6 (six) hours as needed for mild pain, Disp: , Rfl:     multivitamin (THERAGRAN) TABS, Take 1 tablet by mouth daily, Disp: , Rfl:     norethindrone-ethinyl estradiol (JUNEL FE 1/20) 1-20 MG-MCG per tablet, Take 1 tablet by mouth daily, Disp: 90 tablet, Rfl: 4    ranitidine (ZANTAC) 75 MG tablet, Take 75 mg by mouth as needed  , Disp: , Rfl:     traMADol (ULTRAM) 50 mg tablet, Take 1 tablet (50 mg total) by mouth every 8 (eight) hours as needed for moderate pain, Disp: 21 tablet, Rfl: 0    Fluoxetine HCl, PMDD, 10 MG CAPS, Take 1 capsule (10 mg total) by mouth daily, Disp: 30 capsule, Rfl: 5    Allergies   Allergen Reactions    No Active Allergies        Social History   Past Surgical History:   Procedure Laterality Date    ACHILLES TENDON LENGTHENING      SD  DELIVERY ONLY N/A 2016    Procedure:  SECTION (); Surgeon: Rolly Soto MD;  Location: [facility];  Service: Obstetrics    SD LAP,CHOLECYSTECTOMY N/A 2019    Procedure: CHOLECYSTECTOMY LAPAROSCOPIC;  Surgeon: Zac Wong DO;  Location: [facility];  Service: General    WISDOM TOOTH EXTRACTION       Family History   Problem Relation Age of Onset    Hypothyroidism Mother     Rheumatic fever Mother     Hypertension Father     Depression Father     Alzheimer's disease Father     ADD / ADHD Brother     Hearing loss Paternal Grandfather     Diabetes Paternal Grandfather     Hypertension Paternal Grandfather        Objective:  /76 (BP Location: Left arm, Patient Position: Sitting, Cuff Size: Standard)   Pulse 64   Temp (!) 97 3 °F (36 3 °C) (Tympanic)   Ht 5' 7" (1 702 m)   Wt 102 kg (225 lb 12 8 oz)   SpO2 98%   BMI 35 37 kg/m²        Physical Exam   Constitutional: She is oriented to person, place, and time  She appears well-developed and well-nourished  No distress  HENT:   Head: Normocephalic and atraumatic     Right Ear: External ear normal    Left Ear: External ear normal    Nose: Nose normal    Mouth/Throat: Oropharynx is clear and moist  No oropharyngeal exudate  Eyes: Pupils are equal, round, and reactive to light  Conjunctivae and EOM are normal  Right eye exhibits no discharge  Left eye exhibits no discharge  No scleral icterus  Neck: Normal range of motion  Neck supple  No JVD present  No tracheal deviation present  No thyromegaly present  Cardiovascular: Normal rate, regular rhythm, normal heart sounds and intact distal pulses  Exam reveals no gallop and no friction rub  No murmur heard  Pulmonary/Chest: Effort normal and breath sounds normal  No respiratory distress  She has no wheezes  She has no rales  She exhibits no tenderness  Abdominal: Soft  Bowel sounds are normal  She exhibits no distension and no mass  There is no tenderness  There is no rebound and no guarding  Musculoskeletal: Normal range of motion  She exhibits tenderness (Posterior neck tenderness)  She exhibits no edema or deformity  Lymphadenopathy:     She has no cervical adenopathy  Neurological: She is alert and oriented to person, place, and time  She has normal reflexes  No cranial nerve deficit  She exhibits normal muscle tone  Coordination normal    Cranial nerves 2-12 are intact bilaterally  Muscle strength is 5/5 in all extremities  Sensation is intact  Gait is intact   Skin: Skin is warm and dry  No rash noted  She is not diaphoretic  No erythema  No pallor  Psychiatric: She has a normal mood and affect   Her behavior is normal

## 2019-09-23 ENCOUNTER — OFFICE VISIT (OUTPATIENT)
Dept: INTERNAL MEDICINE CLINIC | Age: 34
End: 2019-09-23
Payer: COMMERCIAL

## 2019-09-23 VITALS
TEMPERATURE: 97.3 F | HEIGHT: 67 IN | WEIGHT: 225.8 LBS | SYSTOLIC BLOOD PRESSURE: 104 MMHG | HEART RATE: 64 BPM | BODY MASS INDEX: 35.44 KG/M2 | DIASTOLIC BLOOD PRESSURE: 76 MMHG | OXYGEN SATURATION: 98 %

## 2019-09-23 DIAGNOSIS — G93.5 CHIARI MALFORMATION TYPE I (HCC): Primary | ICD-10-CM

## 2019-09-23 DIAGNOSIS — E66.09 CLASS 2 OBESITY DUE TO EXCESS CALORIES WITHOUT SERIOUS COMORBIDITY WITH BODY MASS INDEX (BMI) OF 35.0 TO 35.9 IN ADULT: ICD-10-CM

## 2019-09-23 DIAGNOSIS — M62.838 CERVICAL PARASPINAL MUSCLE SPASM: ICD-10-CM

## 2019-09-23 DIAGNOSIS — M54.2 NECK PAIN: ICD-10-CM

## 2019-09-23 DIAGNOSIS — R51.9 INTRACTABLE HEADACHE, UNSPECIFIED CHRONICITY PATTERN, UNSPECIFIED HEADACHE TYPE: ICD-10-CM

## 2019-09-23 PROCEDURE — 99213 OFFICE O/P EST LOW 20 MIN: CPT | Performed by: INTERNAL MEDICINE

## 2019-09-23 PROCEDURE — 3008F BODY MASS INDEX DOCD: CPT | Performed by: INTERNAL MEDICINE

## 2019-09-23 NOTE — PATIENT INSTRUCTIONS
Chiari Malformation   WHAT YOU NEED TO KNOW:   What is a Chiari malformation? A Chiari malformation (CM) is a condition that affects the position of your brain within your skull  Your cerebellum (lower part of the brain) is pushed through the hole at the bottom of your skull  This blocks the normal flow of cerebral spinal fluid (CSF) between the brain and spinal canal  Your CM may be caused by problems that developed before you were born  For example, your brain may not have enough room if your skull is smaller than normal  A CM can also develop if too much CSF is drained from your spine, such as from an injury or infection  What are the types of CM? · Type 1  is the most common  Part of the cerebellum is pushed through the hole at the bottom of your skull  You may not know you have this type until you are an adolescent or adult  · Type 2  causes the cerebellum and some of the brain stem to be pushed through the hole  · Type 3  is a severe form of CM  The entire cerebellum and brain stem are pushed through the hole and into the spinal canal  The spinal canal holds your spinal cord  · Type 4  is a rare form that occurs because the cerebellum is not completely developed  What signs and symptoms may happen with a CM? You may have no symptoms, or you may have any of the following:  · Headache that is made worse when you cough or strain     · Neck pain     · Vision problems     · Numbness or tingling in your arms or hands     · Muscle weakness     · Balance problems, dizziness, or ringing in your ears    · Difficulty swallowing    · Infants may gag, be irritable, drool more than usual, or vomit during feedings, have a stiff neck, or have slow weight gain  What other conditions may occur with a CM? · Hydrocephalus  is a condition caused by too much CSF inside the brain  The CSF increases pressure within the skull  Hydrocephalus is more common with Type 2 CM      · Spina bifida  is a condition that prevents the spinal cord and the bones around it from forming properly  Spina bifida can cause partial or complete paralysis  The condition is more common with Type 2 CM  · Syringomyelia  is a condition that causes a cyst filled with CSF to form within the spinal cord  The cyst can damage the spinal cord  Damage can cause pain, weakness, and stiffness in your back, shoulders, arms, or legs  · Tethered cord syndrome  is a condition that causes the spinal cord to attach to the spine  This can damage the muscles and nerves in your lower body  · Spinal curvature  may also occur  Your spine may curve to the left or right, or it may bend forward  Spinal curvature is most common with Type 1 CM and in children  How is a CM diagnosed? Your healthcare provider will ask about your symptoms  He will also examine you for signs that may show you have a CM  You may also need the following tests:  · An MRI scan  may show brain tissue pushing out of your skull and into the spinal canal  You may be given contrast dye to help your brain and spinal canal show up better in the pictures  Tell the healthcare provider if you have ever had an allergic reaction to contrast dye  Do not enter the MRI room with anything metal  Metal can cause serious injury  Tell the healthcare provider if you have any metal in or on your body  · A cine MRI CSF flow study  is an MRI that shows the flow of CSF from your brain to your spinal canal   How is a CM treated? You may not need any treatment if you do not have symptoms  · Medicines  may be given to decrease the amount of CSF your body makes or to decrease CSF pressure  You may also be given prescription pain medicine, muscle relaxers, or antidepressants to relieve pain  · A shunt  may be placed to drain extra CSF  This decreases pressure in the skull  CSF drains through a tube that goes from the skull into the chest wall or abdomen, where it is absorbed into the body       · Surgery may be needed if your CM is severe  Surgery may be done to remove some of your skull or spine  This may create more space for your cerebellum or relieve pressure on your spinal cord  You may need surgery to make your cerebellum smaller  Infants and young children may need surgery to move the spinal cord and close an opening  More than one surgery may be needed  How can I manage my CM? · Sleep upright  on a wedge pillow or an adjustable bed to help decrease headaches  · Massage or physical therapy  may help relieve muscle spasms  · Avoid high-risk activities that could cause head and neck injury, such as roller coasters, skiing, and competitive sports  When should I contact my healthcare provider? · Your signs or symptoms get worse  · You have questions or concerns about your condition or care  When should I seek immediate care or call 911? · You have new or sudden trouble breathing  · You become less alert than usual, or you lose consciousness  CARE AGREEMENT:   You have the right to help plan your care  Learn about your health condition and how it may be treated  Discuss treatment options with your caregivers to decide what care you want to receive  You always have the right to refuse treatment  The above information is an  only  It is not intended as medical advice for individual conditions or treatments  Talk to your doctor, nurse or pharmacist before following any medical regimen to see if it is safe and effective for you  © 2017 [address] Information is for End User's use only and may not be sold, redistributed or otherwise used for commercial purposes  All illustrations and images included in CareNotes® are the copyrighted property of [employer]  or [employer]

## 2019-09-24 ENCOUNTER — TELEPHONE (OUTPATIENT)
Dept: OBGYN CLINIC | Facility: CLINIC | Age: 34
End: 2019-09-24

## 2019-09-24 DIAGNOSIS — N94.3 PMS (PREMENSTRUAL SYNDROME): Primary | ICD-10-CM

## 2019-09-24 RX ORDER — FLUOXETINE HYDROCHLORIDE 10 MG/1
1 CAPSULE ORAL DAILY
Qty: 30 CAPSULE | Refills: 5 | Status: SHIPPED | OUTPATIENT
Start: 2019-09-24 | End: 2020-10-06 | Stop reason: DRUGHIGH

## 2019-09-24 NOTE — TELEPHONE ENCOUNTER
Pt called stating that when she was in for her last appt you spoke with her regarding that you could prescribe for her a low dose an anxiety medication to take prior to getting her period because of the symptoms she was having  She would like to know if we could call a script into her pharmacy or does she need another appt

## 2019-09-27 PROBLEM — E66.9 CLASS 2 OBESITY IN ADULT: Status: ACTIVE | Noted: 2019-09-27

## 2019-09-27 PROBLEM — E66.812 CLASS 2 OBESITY IN ADULT: Status: ACTIVE | Noted: 2019-09-27

## 2019-10-02 ENCOUNTER — CONSULT (OUTPATIENT)
Dept: NEUROSURGERY | Facility: CLINIC | Age: 34
End: 2019-10-02
Payer: COMMERCIAL

## 2019-10-02 VITALS
HEART RATE: 84 BPM | DIASTOLIC BLOOD PRESSURE: 90 MMHG | BODY MASS INDEX: 35.16 KG/M2 | HEIGHT: 67 IN | SYSTOLIC BLOOD PRESSURE: 126 MMHG | RESPIRATION RATE: 16 BRPM | WEIGHT: 224 LBS | TEMPERATURE: 98.3 F

## 2019-10-02 DIAGNOSIS — R51.9 OCCIPITAL HEADACHE: ICD-10-CM

## 2019-10-02 DIAGNOSIS — G44.229 CHRONIC TENSION-TYPE HEADACHE, NOT INTRACTABLE: ICD-10-CM

## 2019-10-02 DIAGNOSIS — G93.5 CHIARI MALFORMATION TYPE I (HCC): ICD-10-CM

## 2019-10-02 DIAGNOSIS — M54.81 BILATERAL OCCIPITAL NEURALGIA: Primary | ICD-10-CM

## 2019-10-02 DIAGNOSIS — M62.838 CERVICAL PARASPINAL MUSCLE SPASM: ICD-10-CM

## 2019-10-02 PROCEDURE — 99203 OFFICE O/P NEW LOW 30 MIN: CPT | Performed by: PHYSICIAN ASSISTANT

## 2019-10-02 NOTE — PATIENT INSTRUCTIONS
Trial of ibuprofen/Motrin 3 OTC tablets 3 times daily x2 weeks for occipital pain  Consultation with headache Neurology for same  Further follow-up with Neurosurgery on an as-needed basis      Continue with all usual activities without any restrictions from a neurosurgical perspective

## 2019-10-02 NOTE — LETTER
October 2, 2019     [place] Cast[place], [address] [facility]    Patient: Jefferson Zabala   YOB: 1985   Date of Visit: 10/2/2019       Dear Dr Jerri Bro:    Thank you for referring Neptali Hickman to me for evaluation  Below are my notes for this consultation  If you have questions, please do not hesitate to call me  I look forward to following your patient along with you  Sincerely,        Judy Bishop PA-C        CC: Ashok Merck, DO  Judy Bishop PA-C  10/2/2019  9:09 AM  Sign at close encounter  Patient ID: Jefferson Zabala is a 37 y o  female  Diagnoses and all orders for this visit:    Bilateral occipital neuralgia  -     Ambulatory referral to Neurology; Future    Cervical paraspinal muscle spasm  -     Ambulatory referral to Neurosurgery    Occipital headache  -     Ambulatory referral to Neurology; Future    Chronic tension-type headache, not intractable  -     Ambulatory referral to Neurology; Future          Assessment/Plan:    Very pleasant 49-year-old female, accompanied by her mother, referred by her primary care provider to re-evaluate MRI cervical spine as well as MRI brain, has recent change in headache characteristics  She reports a recent change in the character of her headaches in the last 6-8 weeks which resulted her and her seeing her primary care provider and having imaging performed  Specifically she has posterior neck/posterior head pain and when asked to locate the worst area of pain, she points to both occipital grooves  She is unaware of any specific event that precipitated the increase in headache symptoms  She reports she does have chronic daily headaches, but these are different in character and location than her new complaint      MRI brain, 8/23/19 carefully reviewed in detail, there is a trace of cerebellar tonsil descent 3 millimeters-4 millimeters at the most   This does not meet criteria for a Chiari malformation  In addition there is no foraminal crowding  As well as no evidence of hydrocephalus or syringomyelia  The study was reviewed with the patient and her mother  (Chiari 1 malformation typically minimum of 5 millimeters and usually not symptomatic until 7-10 millimeters of descent and typically a syrinx of the cervical spine is associated with this)    MRI cervical spine 9/18/19 is also carefully reviewed in detail there is a trace of disc bulge at the C5-C6 level otherwise there is no evidence of central or foraminal stenosis at any level  Very mild degenerative changes are noted  Once again there is no evidence of syringomyelia  The study was also reviewed with the patient and her mother  The patient denies tussive headaches, gait or balance disturbance, difficulty with urinary incontinence or incomplete emptying, swallowing difficulties or voice change, change in vision, tinnitus, numbness or tingling of the upper lower extremities, difficulty managing knife and fork, difficulty managing buttons, change in her handwriting, she is right-hand dominant,, or any other myelopathic symptoms or symptoms consistent with Chiari malformation  On examination today there are no focal neurologic deficits identified  Motor exam the upper or lower extremities is 5 x 5 for power, reflexes were intact and symmetric, sensation was grossly intact, there was normal cervical range of motion, she was exquisitely tender to palpation of both occipital grooves  At this juncture he she is reassured that she has notes significant findings consistent with a Chiari malformation and no additional imaging is indicated relative to this diagnosis      Relative to her occipital headaches, she is advised to try a course of ibuprofen 200 milligrams 3 tablets 3 times daily for at least 2 weeks, should this proved to be ineffective a course of physical therapy is also advised and she has to discuss this with her primary care provider  In addition relative to her chronic headaches and her occipital neuralgia/occipital headaches consultation with headache Neurology is also advised and a consultation with Dr Alivia Garcia has been placed  As there are no lesions identified requiring neurosurgical intervention, further follow-up with Neurosurgery will be on as needed basis  Relative to activities she may continue all activities without restriction from a neurosurgical perspective  These findings, impressions and recommendations are reviewed in great detail with the patient and her mother, they expressed understanding and agreement, their questions were answered completely and to their satisfaction  Return if symptoms worsen or fail to improve  Chief Complaint  Consultation to review MRI brain and MRI cervical spine, complaint of headaches and neck stiffness    HPI       The following portions of the patient's history were reviewed and updated as appropriate: allergies, current medications, past family history, past medical history, past social history and past surgical history  Review of Systems   Constitutional: Negative  HENT: Negative  Eyes: Negative  Respiratory: Negative  Cardiovascular: Negative  Gastrointestinal: Negative  Endocrine: Negative  Genitourinary: Negative  Musculoskeletal: Positive for neck pain (radiates to b/l trapezius muscles)  Skin: Negative  Allergic/Immunologic: Negative  Neurological: Positive for tremors  Hematological: Negative  Psychiatric/Behavioral: Negative  Objective:    Physical Exam   Constitutional: She is oriented to person, place, and time  She appears well-developed and well-nourished  HENT:   Head: Normocephalic and atraumatic  Eyes: Pupils are equal, round, and reactive to light  EOM are normal    Cardiovascular: Normal rate     Pulmonary/Chest: Effort normal and breath sounds normal    Neurological: She is alert and oriented to person, place, and time  Skin: Skin is warm and dry  Psychiatric: She has a normal mood and affect  Vitals reviewed  Neurologic Exam     Mental Status   Oriented to person, place, and time  Cranial Nerves     CN III, IV, VI   Pupils are equal, round, and reactive to light  Extraocular motions are normal             MRI BRAIN WITHOUT CONTRAST   8/23/19     INDICATION: R51: Headache      COMPARISON:   None      TECHNIQUE:  Sagittal T1, axial T2, axial FLAIR, axial T1, axial Noble and axial diffusion imaging      IMAGE QUALITY:  Diagnostic      FINDINGS:     BRAIN PARENCHYMA:  There is no discrete mass, mass effect or midline shift  There is no intracranial hemorrhage  There is no evidence of acute infarction and diffusion imaging is unremarkable  There are no white matter changes in the cerebral   hemispheres  Cerebellar tonsils descend 4 mm below the foramen magnum  This is borderline for Chiari malformation      VENTRICLES:  The ventricles are normal in size and contour      SELLA AND PITUITARY GLAND:  Normal      ORBITS:  Normal      PARANASAL SINUSES:  Normal      VASCULATURE:  Evaluation of the major intracranial vasculature demonstrates appropriate flow voids      CALVARIUM AND SKULL BASE:  Normal      EXTRACRANIAL SOFT TISSUES:  Normal      IMPRESSION:     Cerebellar tonsils descend 4 mm below the foramen magnum which is borderline for Chiari I malformation  There is no obvious crowding at the foramen magnum  Otherwise, normal MRI of the brain  MRI CERVICAL SPINE WITHOUT CONTRAST   9/18/19     INDICATION: Q07 00: 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 hydrocephalus  R51: Headache      COMPARISON:  None      TECHNIQUE:  Sagittal T1, sagittal T2, sagittal inversion recovery, axial T2, axial  2D merge     IMAGE QUALITY:  Diagnostic     FINDINGS:     ALIGNMENT:  Normal alignment of the cervical spine  No compression fracture  No subluxation    No scoliosis      MARROW SIGNAL:  Normal marrow signal is identified within the visualized bony structures  No discrete marrow lesion      CERVICAL AND VISUALIZED THORACIC CORD:  Normal signal within the visualized cord      PREVERTEBRAL AND PARASPINAL SOFT TISSUES:  Normal      VISUALIZED POSTERIOR FOSSA:  Cerebellar tonsils descend 4 mm below the foramen magnum without obvious crowding at foramen magnum  No evidence of cervical spinal cord syrinx      CERVICAL DISC SPACES:     C2-C3:  Normal      C3-C4:  Normal      C4-C5:  Normal      C5-C6:  Minimal annular bulge without central or foraminal narrowing      C6-C7:  Normal      C7-T1:  Normal      UPPER THORACIC DISC SPACES:  Normal      IMPRESSION:     Cerebellar tonsils descend 4 mm below the foramen magnum without evidence of crowding at the foramen magnum  No evidence of cervical spinal cord syrinx      Minimal degenerative changes

## 2019-10-02 NOTE — PROGRESS NOTES
Patient ID: Diane Machado is a 37 y o  female  Diagnoses and all orders for this visit:    Bilateral occipital neuralgia  -     Ambulatory referral to Neurology; Future    Cervical paraspinal muscle spasm  -     Ambulatory referral to Neurosurgery    Occipital headache  -     Ambulatory referral to Neurology; Future    Chronic tension-type headache, not intractable  -     Ambulatory referral to Neurology; Future          Assessment/Plan:    Very pleasant 58-year-old female, accompanied by her mother, referred by her primary care provider to re-evaluate MRI cervical spine as well as MRI brain, has recent change in headache characteristics  She reports a recent change in the character of her headaches in the last 6-8 weeks which resulted her and her seeing her primary care provider and having imaging performed  Specifically she has posterior neck/posterior head pain and when asked to locate the worst area of pain, she points to both occipital grooves  She is unaware of any specific event that precipitated the increase in headache symptoms  She reports she does have chronic daily headaches, but these are different in character and location than her new complaint  MRI brain, 8/23/19 carefully reviewed in detail, there is a trace of cerebellar tonsil descent 3 millimeters-4 millimeters at the most   This does not meet criteria for a Chiari malformation  In addition there is no foraminal crowding  As well as no evidence of hydrocephalus or syringomyelia  The study was reviewed with the patient and her mother  (Chiari 1 malformation typically minimum of 5 millimeters and usually not symptomatic until 7-10 millimeters of descent and typically a syrinx of the cervical spine is associated with this)    MRI cervical spine 9/18/19 is also carefully reviewed in detail there is a trace of disc bulge at the C5-C6 level otherwise there is no evidence of central or foraminal stenosis at any level  Very mild degenerative changes are noted  Once again there is no evidence of syringomyelia  The study was also reviewed with the patient and her mother  The patient denies tussive headaches, gait or balance disturbance, difficulty with urinary incontinence or incomplete emptying, swallowing difficulties or voice change, change in vision, tinnitus, numbness or tingling of the upper lower extremities, difficulty managing knife and fork, difficulty managing buttons, change in her handwriting, she is right-hand dominant,, or any other myelopathic symptoms or symptoms consistent with Chiari malformation  On examination today there are no focal neurologic deficits identified  Motor exam the upper or lower extremities is 5 x 5 for power, reflexes were intact and symmetric, sensation was grossly intact, there was normal cervical range of motion, she was exquisitely tender to palpation of both occipital grooves  At this juncture he she is reassured that she has notes significant findings consistent with a Chiari malformation and no additional imaging is indicated relative to this diagnosis  Relative to her occipital headaches, she is advised to try a course of ibuprofen 200 milligrams 3 tablets 3 times daily for at least 2 weeks, should this proved to be ineffective a course of physical therapy is also advised and she has to discuss this with her primary care provider  In addition relative to her chronic headaches and her occipital neuralgia/occipital headaches consultation with headache Neurology is also advised and a consultation with Dr Cesar Miner has been placed  As there are no lesions identified requiring neurosurgical intervention, further follow-up with Neurosurgery will be on as needed basis  Relative to activities she may continue all activities without restriction from a neurosurgical perspective      These findings, impressions and recommendations are reviewed in great detail with the patient and her mother, they expressed understanding and agreement, their questions were answered completely and to their satisfaction  Return if symptoms worsen or fail to improve  Chief Complaint  Consultation to review MRI brain and MRI cervical spine, complaint of headaches and neck stiffness    HPI       The following portions of the patient's history were reviewed and updated as appropriate: allergies, current medications, past family history, past medical history, past social history and past surgical history  Review of Systems   Constitutional: Negative  HENT: Negative  Eyes: Negative  Respiratory: Negative  Cardiovascular: Negative  Gastrointestinal: Negative  Endocrine: Negative  Genitourinary: Negative  Musculoskeletal: Positive for neck pain (radiates to b/l trapezius muscles)  Skin: Negative  Allergic/Immunologic: Negative  Neurological: Positive for tremors  Hematological: Negative  Psychiatric/Behavioral: Negative  Objective:    Physical Exam   Constitutional: She is oriented to person, place, and time  She appears well-developed and well-nourished  HENT:   Head: Normocephalic and atraumatic  Eyes: Pupils are equal, round, and reactive to light  EOM are normal    Cardiovascular: Normal rate  Pulmonary/Chest: Effort normal and breath sounds normal    Neurological: She is alert and oriented to person, place, and time  Skin: Skin is warm and dry  Psychiatric: She has a normal mood and affect  Vitals reviewed  Neurologic Exam     Mental Status   Oriented to person, place, and time  Cranial Nerves     CN III, IV, VI   Pupils are equal, round, and reactive to light    Extraocular motions are normal             MRI BRAIN WITHOUT CONTRAST   8/23/19     INDICATION: R51: Headache      COMPARISON:   None      TECHNIQUE:  Sagittal T1, axial T2, axial FLAIR, axial T1, axial [place]ona and axial diffusion imaging      IMAGE QUALITY: Diagnostic      FINDINGS:     BRAIN PARENCHYMA:  There is no discrete mass, mass effect or midline shift  There is no intracranial hemorrhage  There is no evidence of acute infarction and diffusion imaging is unremarkable  There are no white matter changes in the cerebral   hemispheres  Cerebellar tonsils descend 4 mm below the foramen magnum  This is borderline for Chiari malformation      VENTRICLES:  The ventricles are normal in size and contour      SELLA AND PITUITARY GLAND:  Normal      ORBITS:  Normal      PARANASAL SINUSES:  Normal      VASCULATURE:  Evaluation of the major intracranial vasculature demonstrates appropriate flow voids      CALVARIUM AND SKULL BASE:  Normal      EXTRACRANIAL SOFT TISSUES:  Normal      IMPRESSION:     Cerebellar tonsils descend 4 mm below the foramen magnum which is borderline for Chiari I malformation  There is no obvious crowding at the foramen magnum  Otherwise, normal MRI of the brain  MRI CERVICAL SPINE WITHOUT CONTRAST   9/18/19     INDICATION: Q07 00: 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 hydrocephalus  R51: Headache      COMPARISON:  None      TECHNIQUE:  Sagittal T1, sagittal T2, sagittal inversion recovery, axial T2, axial  2D merge     IMAGE QUALITY:  Diagnostic     FINDINGS:     ALIGNMENT:  Normal alignment of the cervical spine  No compression fracture  No subluxation  No scoliosis      MARROW SIGNAL:  Normal marrow signal is identified within the visualized bony structures  No discrete marrow lesion      CERVICAL AND VISUALIZED THORACIC CORD:  Normal signal within the visualized cord      PREVERTEBRAL AND PARASPINAL SOFT TISSUES:  Normal      VISUALIZED POSTERIOR FOSSA:  Cerebellar tonsils descend 4 mm below the foramen magnum without obvious crowding at foramen magnum    No evidence of cervical spinal cord syrinx      CERVICAL DISC SPACES:     C2-C3:  Normal      C3-C4:  Normal      C4-C5:  Normal      C5-C6:  Minimal annular bulge without central or foraminal narrowing      C6-C7:  Normal      C7-T1:  Normal      UPPER THORACIC DISC SPACES:  Normal      IMPRESSION:     Cerebellar tonsils descend 4 mm below the foramen magnum without evidence of crowding at the foramen magnum  No evidence of cervical spinal cord syrinx      Minimal degenerative changes

## 2019-12-17 ENCOUNTER — CONSULT (OUTPATIENT)
Dept: NEUROLOGY | Facility: CLINIC | Age: 34
End: 2019-12-17
Payer: COMMERCIAL

## 2019-12-17 ENCOUNTER — TELEPHONE (OUTPATIENT)
Dept: NEUROLOGY | Facility: CLINIC | Age: 34
End: 2019-12-17

## 2019-12-17 VITALS
BODY MASS INDEX: 34.53 KG/M2 | SYSTOLIC BLOOD PRESSURE: 120 MMHG | HEART RATE: 75 BPM | HEIGHT: 67 IN | WEIGHT: 220 LBS | DIASTOLIC BLOOD PRESSURE: 70 MMHG

## 2019-12-17 DIAGNOSIS — G44.229 CHRONIC TENSION-TYPE HEADACHE, NOT INTRACTABLE: ICD-10-CM

## 2019-12-17 DIAGNOSIS — R51.9 OCCIPITAL HEADACHE: ICD-10-CM

## 2019-12-17 DIAGNOSIS — M54.81 BILATERAL OCCIPITAL NEURALGIA: ICD-10-CM

## 2019-12-17 PROCEDURE — 99205 OFFICE O/P NEW HI 60 MIN: CPT | Performed by: PSYCHIATRY & NEUROLOGY

## 2019-12-17 RX ORDER — GABAPENTIN 300 MG/1
300 CAPSULE ORAL
Qty: 30 CAPSULE | Refills: 3 | Status: SHIPPED | OUTPATIENT
Start: 2019-12-17 | End: 2020-02-07 | Stop reason: SDUPTHER

## 2019-12-17 NOTE — ASSESSMENT & PLAN NOTE
-will start patient on gabapentin 300mg at bedtime  -recommend patient be seen by Dr Keerthi Perez for occipital trigger point injections  -will refer patient to pain management as well

## 2019-12-17 NOTE — PROGRESS NOTES
Patient ID: Columba Barrios is a 37 y o  female  Assessment/Plan: This is a 38 y/o Female who is here as a new patient for evaluation of headache  These headaches are likely occipital neuralgia  PLAN:      Problem List Items Addressed This Visit        Other    Occipital headache    Relevant Medications    gabapentin (NEURONTIN) 300 mg capsule    Other Relevant Orders    Ambulatory referral to Pain Management    Ambulatory referral to Pain Management    Bilateral occipital neuralgia     -will start patient on gabapentin 300mg at bedtime  -recommend patient be seen by Dr Lore Oreilly for occipital trigger point injections  -will refer patient to pain management as well  Relevant Medications    gabapentin (NEURONTIN) 300 mg capsule    Other Relevant Orders    Ambulatory referral to Pain Management    Ambulatory referral to Pain Management         Follow up with Dr Rolando Del Rio next available  I would be happy to see the patient sooner if any new questions/concerns arise  Patient/Guardian was advised to the call the office if they have any questions and concerns in the meantime  Patient/Guardian does understand that if they have any new stroke like symptoms such as facial droop on one side, weakness/paralysis on either side, speech trouble, numbness on one side, balance issues, any vision changes, extreme dizziness or any new headache, to call 9-1-1 immediately or to proceed to the nearest ER immediately  Subjective:    HPI    This is a 38-year-old female who's here as a new patient for evaluation of headache  Her headache started at the age of 19  Frequency of her headaches is daily  Says is approximately 7 a week  They can last sometimes a few hours sometimes a whole day depending on if she takes her medications  Intensity of her headache is 7/10  They're located on the back of the head and the front of the head    Description of her headache is tight band electrical and pressure like   Number of days missed per month because of headaches is 1 work today  Associated symptoms or headaches or sore/stiff neck, concentration problem, decreased appetite, prefers a quiet dark room  She also has tingling or numbness in hands and feet  She is sensitive to sound and odor but not to light  She did get her ear pierced her right ear which did seem to help this type headache which she is still having occipital headaches  Triggers for headaches are stress, missing meals, weather changes, fatigue  She does take a lot of Motrin at least 3-4 tablets of Motrin when she does have these headaches on which does relieve the the headaches but she does not want to take medications  She has not tried any preventive medications  She was seen her PCP by her PCP and had an MRI brain which I personally the images of which showed a 4 mm Chiari malformation 1 and was recommended to see Neurosurgery neurosurgery did not believe this is causing her headaches and no intervention was recommended and was recommended Neurology outpatient referral       The following portions of the patient's history were reviewed and updated as appropriate: She  has a past medical history of ADHD (attention deficit hyperactivity disorder), Asthma, Chiari malformation type II ([address] ), Clostridium difficile infection, and Varicella    She   Patient Active Problem List    Diagnosis Date Noted    Bilateral occipital neuralgia 12/17/2019    Class 2 obesity in adult 09/27/2019    Occipital headache 08/12/2019    Cervical paraspinal muscle spasm 08/12/2019    Chiari malformation type I ([address] ) 08/12/2019    Neck pain 06/29/2018    Cholelithiasis 06/29/2018    RUQ pain 06/15/2018    Screening for metabolic disorder 87/24/0537    Mild persistent asthma without complication 32/19/3707    Allergic rhinitis 04/18/2018    Back pain 04/18/2018    Acute bronchitis 10/13/2017    Chronic cough 03/19/2015     She  has a past surgical history that includes Achilles tendon lengthening; pr  delivery only (N/A, 2016); Lena tooth extraction; and pr lap,cholecystectomy (N/A, 2019)  Her family history includes ADD / ADHD in her brother; Alzheimer's disease in her father; Depression in her father; Diabetes in her paternal grandfather; Hearing loss in her paternal grandfather; Hypertension in her father and paternal grandfather; Hypothyroidism in her mother; Rheumatic fever in her mother  She  reports that she has never smoked  She has never used smokeless tobacco  She reports that she drinks alcohol  She reports that she does not use drugs  Current Outpatient Medications   Medication Sig Dispense Refill    cyclobenzaprine (FLEXERIL) 5 mg tablet Take 1 tablet (5 mg total) by mouth daily at bedtime (Patient taking differently: Take 5 mg by mouth daily at bedtime As needed) 10 tablet 0    Fluoxetine HCl, PMDD, 10 MG CAPS Take 1 capsule (10 mg total) by mouth daily 30 capsule 5    fluticasone (FLONASE) 50 mcg/act nasal spray 1 spray into each nostril daily (Patient taking differently: 1 spray into each nostril as needed  ) 16 g 0    fluticasone-vilanterol (BREO ELLIPTA) 100-25 mcg/inh inhaler Inhale 1 puff daily Rinse mouth after use   (Patient taking differently: Inhale 1 puff as needed Rinse mouth after use  ) 2 Inhaler 0    ibuprofen (MOTRIN) 200 mg tablet Take by mouth every 6 (six) hours as needed for mild pain      multivitamin (THERAGRAN) TABS Take 1 tablet by mouth daily      norethindrone-ethinyl estradiol (JUNEL FE 1/20) 1-20 MG-MCG per tablet Take 1 tablet by mouth daily 90 tablet 4    ranitidine (ZANTAC) 75 MG tablet Take 75 mg by mouth as needed        traMADol (ULTRAM) 50 mg tablet Take 1 tablet (50 mg total) by mouth every 8 (eight) hours as needed for moderate pain 21 tablet 0    gabapentin (NEURONTIN) 300 mg capsule Take 1 capsule (300 mg total) by mouth daily at bedtime 30 capsule 3     No current facility-administered medications for this visit  Current Outpatient Medications on File Prior to Visit   Medication Sig    cyclobenzaprine (FLEXERIL) 5 mg tablet Take 1 tablet (5 mg total) by mouth daily at bedtime (Patient taking differently: Take 5 mg by mouth daily at bedtime As needed)    Fluoxetine HCl, PMDD, 10 MG CAPS Take 1 capsule (10 mg total) by mouth daily    fluticasone (FLONASE) 50 mcg/act nasal spray 1 spray into each nostril daily (Patient taking differently: 1 spray into each nostril as needed  )    fluticasone-vilanterol (BREO ELLIPTA) 100-25 mcg/inh inhaler Inhale 1 puff daily Rinse mouth after use  (Patient taking differently: Inhale 1 puff as needed Rinse mouth after use  )    ibuprofen (MOTRIN) 200 mg tablet Take by mouth every 6 (six) hours as needed for mild pain    multivitamin (THERAGRAN) TABS Take 1 tablet by mouth daily    norethindrone-ethinyl estradiol (JUNEL FE 1/20) 1-20 MG-MCG per tablet Take 1 tablet by mouth daily    ranitidine (ZANTAC) 75 MG tablet Take 75 mg by mouth as needed      traMADol (ULTRAM) 50 mg tablet Take 1 tablet (50 mg total) by mouth every 8 (eight) hours as needed for moderate pain     No current facility-administered medications on file prior to visit  She is allergic to no active allergies         Objective:    Blood pressure 120/70, pulse 75, height 5' 7" (1 702 m), weight 99 8 kg (220 lb), not currently breastfeeding  Physical Exam  General: Patient is not in any acute/apparent distress, well nourished, well developed and cooperative  She does have some tenderness in the occipital grooves bilaterally on her scalp  HEENT: normocephalic, atraumatic, moist membranes  Neck: supple  Heart: regular heart rate and rhythm, no murmurs, rubs and or gallops  Chest: Clear to auscultation bilaterally  Abdomen: soft and non-tender     Extremities: no edema noted   Skin: no lesions or rash  Musculosketal: no bony abnormalities    Neurologic Examination:   Mental status: alert, awake, oriented X 3 and following commands  Speech/Language: Speech is fluent without any dysarthria, no aphasia noted, can name, repeat, read and write and comprehension intact    Cranial Nerves:   CN I: smell not tested  CN II: Visual fields full to confrontation, fundus - no papilledema noted  CN III, IV, VI: Extraocular movements intact bilaterally  Pupils equal round and reactive to light bilaterally  CN V: Facial sensation is normal   CN VII: Full and symmetric facial movement  CN VIII: Hearing is normal   CN IX, X: Palate elevates symmetrically  Normal gag reflex  CN XI: Shoulder shrug strength is normal   CN XII: Tongue midline without atrophy or fasciculations  Motor:   Strength 5/5 in all 4 extremities  No pronator drift  Normal rapid alternating movements  Bulk/tone - normal   Fasiculations - none    Sensory:   Sensation intact to soft touch, vibration and pinprick in all 4 extremities  Cerebellar:   Finger-to-nose intact, normal heel to shin  Reflexes: 2+ in all 4 extremities  Pathologic reflexes - babinski reflex negative, Hoffmans reflex - negative    Gait:   Normal gait, able to tandem walk, able to tip-toe, able to walk on heels, normal arm to swing  Rhomberg sign negative    ROS:  I personally reviewed ROS  Review of Systems   Constitutional: Negative  Negative for appetite change and fever  HENT: Negative  Negative for hearing loss, tinnitus, trouble swallowing and voice change  Eyes: Negative  Negative for photophobia and pain  Respiratory: Negative  Negative for shortness of breath  Cardiovascular: Negative  Negative for palpitations  Gastrointestinal: Negative  Negative for nausea and vomiting  Endocrine: Negative  Negative for cold intolerance and heat intolerance  Genitourinary: Negative  Negative for dysuria, frequency and urgency  Musculoskeletal: Negative  Negative for myalgias and neck pain  Skin: Negative  Negative for rash  Neurological: Positive for headaches  Negative for dizziness, tremors, seizures, syncope, facial asymmetry, speech difficulty, weakness, light-headedness and numbness  Patient states that she has headaches every day  Hematological: Negative  Does not bruise/bleed easily  Psychiatric/Behavioral: Negative  Negative for confusion, hallucinations and sleep disturbance

## 2019-12-23 ENCOUNTER — OFFICE VISIT (OUTPATIENT)
Dept: INTERNAL MEDICINE CLINIC | Age: 34
End: 2019-12-23
Payer: COMMERCIAL

## 2019-12-23 VITALS
HEIGHT: 67 IN | TEMPERATURE: 98.1 F | SYSTOLIC BLOOD PRESSURE: 104 MMHG | BODY MASS INDEX: 35.53 KG/M2 | WEIGHT: 226.4 LBS | DIASTOLIC BLOOD PRESSURE: 72 MMHG | OXYGEN SATURATION: 97 % | HEART RATE: 78 BPM

## 2019-12-23 DIAGNOSIS — J06.9 UPPER RESPIRATORY TRACT INFECTION, UNSPECIFIED TYPE: Primary | ICD-10-CM

## 2019-12-23 PROCEDURE — 3008F BODY MASS INDEX DOCD: CPT | Performed by: INTERNAL MEDICINE

## 2019-12-23 PROCEDURE — 99213 OFFICE O/P EST LOW 20 MIN: CPT | Performed by: INTERNAL MEDICINE

## 2019-12-23 PROCEDURE — 1036F TOBACCO NON-USER: CPT | Performed by: INTERNAL MEDICINE

## 2019-12-23 RX ORDER — AZITHROMYCIN 250 MG/1
TABLET, FILM COATED ORAL
Qty: 6 TABLET | Refills: 0 | Status: SHIPPED | OUTPATIENT
Start: 2019-12-23 | End: 2019-12-28

## 2019-12-23 NOTE — PROGRESS NOTES
Assessment/Plan:     Diagnoses and all orders for this visit:    Upper respiratory tract infection, unspecified type  -     azithromycin (ZITHROMAX) 250 mg tablet; Take 2 tablets (500 mg total) by mouth daily for 1 day, THEN 1 tablet (250 mg total) daily for 4 days  Subjective:   Chief Complaint   Patient presents with    Cold Like Symptoms     pt  presents for sinus congestion, loss of voice and Bilateral ear pressure  Started about a week ago  Patient ID: Jace Dobson is a 37 y o  female  HPI  This is a very pleasant 37 years young lady who is work in the [employer] office is here for upper respiratory tract symptoms she is getting some sore throat this is symptoms are going on for about the 1 week but recently she started to have change of and voice and hoarseness of the voice she is coughing little bit no nausea or vomiting no diarrhea, no fever or chills she denying any ear pain headache or dizziness she does not smoke  She has a significant redness and the bulging of the left tympanic membrane although she does not have any complaints both tympanic membranes are bulging mild pharyngitis and erythema of the pharynx with no exudate was noted chest was clear on auscultation  The following portions of the patient's history were reviewed and updated as appropriate: allergies, current medications, past family history, past medical history, past social history, past surgical history and problem list     Review of Systems   Constitutional: Positive for fatigue  Negative for fever  HENT: Positive for congestion, ear pain, postnasal drip, sneezing, sore throat and voice change  Negative for sinus pressure  Respiratory: Positive for cough  Cardiovascular: Negative  Gastrointestinal: Negative  Genitourinary: Negative  Musculoskeletal: Negative for myalgias           Past Medical History:   Diagnosis Date    ADHD (attention deficit hyperactivity disorder)     Asthma sports induced    [address]ari malformation type II ([facility] [address] )     Clostridium difficile infection     Varicella     childhood         Current Outpatient Medications:     cyclobenzaprine (FLEXERIL) 5 mg tablet, Take 1 tablet (5 mg total) by mouth daily at bedtime (Patient taking differently: Take 5 mg by mouth daily at bedtime As needed), Disp: 10 tablet, Rfl: 0    Fluoxetine HCl, PMDD, 10 MG CAPS, Take 1 capsule (10 mg total) by mouth daily, Disp: 30 capsule, Rfl: 5    fluticasone (FLONASE) 50 mcg/act nasal spray, 1 spray into each nostril daily (Patient taking differently: 1 spray into each nostril as needed  ), Disp: 16 g, Rfl: 0    fluticasone-vilanterol (BREO ELLIPTA) 100-25 mcg/inh inhaler, Inhale 1 puff daily Rinse mouth after use  (Patient taking differently: Inhale 1 puff as needed Rinse mouth after use  ), Disp: 2 Inhaler, Rfl: 0    gabapentin (NEURONTIN) 300 mg capsule, Take 1 capsule (300 mg total) by mouth daily at bedtime, Disp: 30 capsule, Rfl: 3    ibuprofen (MOTRIN) 200 mg tablet, Take by mouth every 6 (six) hours as needed for mild pain, Disp: , Rfl:     multivitamin (THERAGRAN) TABS, Take 1 tablet by mouth daily, Disp: , Rfl:     norethindrone-ethinyl estradiol (JUNEL FE 1/20) 1-20 MG-MCG per tablet, Take 1 tablet by mouth daily, Disp: 90 tablet, Rfl: 4    ranitidine (ZANTAC) 75 MG tablet, Take 75 mg by mouth as needed  , Disp: , Rfl:     traMADol (ULTRAM) 50 mg tablet, Take 1 tablet (50 mg total) by mouth every 8 (eight) hours as needed for moderate pain, Disp: 21 tablet, Rfl: 0    Allergies   Allergen Reactions    No Active Allergies        Social History   Past Surgical History:   Procedure Laterality Date    ACHILLES TENDON LENGTHENING      [place]  DELIVERY ONLY N/A 2016    Procedure:  SECTION ();   Surgeon: Manoj Leonardo MD;  Location: [facility];  Service: Obstetrics    [place] LAP,CHOLECYSTECTOMY N/A 2019    Procedure: CHOLECYSTECTOMY LAPAROSCOPIC;  Surgeon: Cassandra Echavarria DO Gisel;  [facility];  Service: General    WISDOM TOOTH EXTRACTION       Family History   Problem Relation Age of Onset    Hypothyroidism Mother     Rheumatic fever Mother     Hypertension Father     Depression Father     Alzheimer's disease Father     ADD / ADHD Brother     Hearing loss Paternal Grandfather     Diabetes Paternal Grandfather     Hypertension Paternal Grandfather        Objective:  /72 (BP Location: Left arm, Patient Position: Sitting, Cuff Size: Standard)   Pulse 78   Temp 98 1 °F (36 7 °C) (Tympanic)   Ht 5' 7" (1 702 m)   Wt 103 kg (226 lb 6 4 oz)   SpO2 97%   BMI 35 46 kg/m²        Physical Exam   Constitutional: She is oriented to person, place, and time  She appears well-developed and well-nourished  HENT:   Head: Normocephalic and atraumatic  Right Ear: External ear normal    Left Ear: External ear normal      Face slightly flushed,  Ear bulging TM  Nose ,turbinates swollen red and edematose no pus  Throat tongue normal , enlarge tonsils , erythema of posterior pharynx , no exudates           Eyes: Pupils are equal, round, and reactive to light  Neck: Normal range of motion  Neck supple  Cardiovascular: Normal rate, regular rhythm and normal heart sounds  Pulmonary/Chest: Effort normal and breath sounds normal  No respiratory distress  Musculoskeletal: She exhibits no edema  Neurological: She is alert and oriented to person, place, and time  Skin: Skin is warm and dry

## 2020-01-17 ENCOUNTER — TELEPHONE (OUTPATIENT)
Dept: NEUROLOGY | Facility: CLINIC | Age: 35
End: 2020-01-17

## 2020-01-17 NOTE — TELEPHONE ENCOUNTER
Patient calling and asking if she can take gabapentin 300mg every other night instead of nightly  She states that is has been effective, but that she feels really tired on it when she gets up around 5am and it takes until 11am before she has any energy  Please advise  CB# [phone] okay is to leave a detailed message

## 2020-01-20 NOTE — TELEPHONE ENCOUNTER
Called and left detailed message for patient  Advised patient to call office if she has any questions/concerns

## 2020-01-22 ENCOUNTER — TELEPHONE (OUTPATIENT)
Dept: NEUROLOGY | Facility: CLINIC | Age: 35
End: 2020-01-22

## 2020-01-22 NOTE — TELEPHONE ENCOUNTER
Patient called in stating she will be receiving injections for her occipital neuralgia at her May appointment with Dr Nusrat Flor  Patient was wondering what the codes were so she can call her insurance to verify that this is a covered service  Patient states she now has [address] [place]  ID#: [id number]  Group #: [id number]  Provider services #: [phone]      Patient also looking for a sooner appointment if available  Please advise what kind of injections the patient will be receiving       Thanks    [facility]

## 2020-01-23 NOTE — TELEPHONE ENCOUNTER
I have not yet seen the patient, she will be new to me  Dr Rosales talked to me about her and stated possible occipital neuralgia  Is he has any significant tenderpoints on palpation and based on her history on my consult, may be appropriate to do trigger point injections then  I cannot confirm that until I see her however      Am CCing  to see if she feels comfortable seeing this patient sooner and can staff with me    Thanks

## 2020-01-24 NOTE — TELEPHONE ENCOUNTER
Happy to see    She may also benefit from PM eval/ ONBs- I think I saw this was ordered by Dr Familia Ni

## 2020-01-24 NOTE — TELEPHONE ENCOUNTER
Fredrick Hagen,    Would you be able to contact this patient and get her scheduled with Padilla Arce for a sooner appointment- per Dr Marcelina Willett she will staff with her      Thanks      [facility]

## 2020-02-04 ENCOUNTER — TELEPHONE (OUTPATIENT)
Dept: INTERNAL MEDICINE CLINIC | Age: 35
End: 2020-02-04

## 2020-02-04 ENCOUNTER — OFFICE VISIT (OUTPATIENT)
Dept: INTERNAL MEDICINE CLINIC | Age: 35
End: 2020-02-04
Payer: COMMERCIAL

## 2020-02-04 VITALS
SYSTOLIC BLOOD PRESSURE: 138 MMHG | DIASTOLIC BLOOD PRESSURE: 70 MMHG | BODY MASS INDEX: 36.07 KG/M2 | HEIGHT: 67 IN | HEART RATE: 78 BPM | WEIGHT: 229.8 LBS | OXYGEN SATURATION: 98 % | TEMPERATURE: 98.5 F

## 2020-02-04 DIAGNOSIS — B02.9 HERPES ZOSTER WITHOUT COMPLICATION: Primary | ICD-10-CM

## 2020-02-04 DIAGNOSIS — H65.91 RIGHT NON-SUPPURATIVE OTITIS MEDIA: Primary | ICD-10-CM

## 2020-02-04 PROBLEM — J20.9 ACUTE BRONCHITIS: Status: RESOLVED | Noted: 2017-10-13 | Resolved: 2020-02-04

## 2020-02-04 PROBLEM — R10.11 RUQ PAIN: Status: RESOLVED | Noted: 2018-06-15 | Resolved: 2020-02-04

## 2020-02-04 PROBLEM — Z13.228 SCREENING FOR METABOLIC DISORDER: Status: RESOLVED | Noted: 2018-06-15 | Resolved: 2020-02-04

## 2020-02-04 PROBLEM — J30.9 ALLERGIC RHINITIS: Status: RESOLVED | Noted: 2018-04-18 | Resolved: 2020-02-04

## 2020-02-04 PROCEDURE — 99213 OFFICE O/P EST LOW 20 MIN: CPT | Performed by: NURSE PRACTITIONER

## 2020-02-04 PROCEDURE — 1036F TOBACCO NON-USER: CPT | Performed by: NURSE PRACTITIONER

## 2020-02-04 RX ORDER — OXYCODONE HYDROCHLORIDE 5 MG/1
5 TABLET ORAL
Qty: 30 TABLET | Refills: 0 | Status: SHIPPED | OUTPATIENT
Start: 2020-02-04 | End: 2020-02-04

## 2020-02-04 RX ORDER — CEFDINIR 300 MG/1
300 CAPSULE ORAL EVERY 12 HOURS SCHEDULED
Qty: 14 CAPSULE | Refills: 0 | Status: SHIPPED | OUTPATIENT
Start: 2020-02-04 | End: 2020-02-11

## 2020-02-04 NOTE — PATIENT INSTRUCTIONS
You have been prescribed an antibiotic today  Take the full course of prescription  Recommend taking with food as may upset your stomach  Also would recommend OTC probiotic or yogurt to help protect the natural socorro of your stomach  Rest   Stay well hydrated  Return for new/worsening symptoms

## 2020-02-04 NOTE — PROGRESS NOTES
Assessment/Plan:    R Otitis Media  - start cefdinir, does not tolerate augmentin well  No PCN allergy  - rest  - stay well hydrated  - return for new/worsening s/sx    Also of note - accidentally sent oxycodone for pt with diagnosis shingles link (wrong chart)  Pharm was called immediately and rx cancelled  Problem List Items Addressed This Visit     None      Visit Diagnoses     Right non-suppurative otitis media    -  Primary    Relevant Medications    cefdinir (OMNICEF) 300 mg capsule        M*Modal software was used to dictate this note  It may contain errors with dictating incorrect words or incorrect spelling  Please contact the provider directly with any questions  Subjective:      Patient ID: Christina Hopson is a 37 y o  female  Earache    There is pain in the right ear  This is a new problem  The current episode started yesterday  The problem occurs constantly  The problem has been gradually worsening  Maximum temperature: + subjective fever and chills  The pain is mild  Associated symptoms include rhinorrhea  Pertinent negatives include no abdominal pain, coughing, diarrhea, ear discharge, headaches, hearing loss, sore throat or vomiting  The following portions of the patient's history were reviewed and updated as appropriate: allergies, current medications, past family history, past medical history, past social history, past surgical history and problem list     Review of Systems   Constitutional: Positive for chills and fatigue  Negative for activity change, appetite change and fever  HENT: Positive for congestion (mild), ear pain and rhinorrhea  Negative for ear discharge, hearing loss, postnasal drip, sinus pressure, sinus pain, sore throat and tinnitus  Respiratory: Negative for cough, shortness of breath and wheezing  Cardiovascular: Negative for chest pain and palpitations  Gastrointestinal: Negative for abdominal pain, constipation, diarrhea, nausea and vomiting  Neurological: Positive for light-headedness  Negative for dizziness, syncope and headaches  Past Medical History:   Diagnosis Date    ADHD (attention deficit hyperactivity disorder)     Asthma     sports induced    [address]ari malformation type II ([facility] [address] )     Clostridium difficile infection     Varicella     childhood         Current Outpatient Medications:     cyclobenzaprine (FLEXERIL) 5 mg tablet, Take 1 tablet (5 mg total) by mouth daily at bedtime (Patient taking differently: Take 5 mg by mouth daily at bedtime As needed), Disp: 10 tablet, Rfl: 0    Fluoxetine HCl, PMDD, 10 MG CAPS, Take 1 capsule (10 mg total) by mouth daily, Disp: 30 capsule, Rfl: 5    fluticasone (FLONASE) 50 mcg/act nasal spray, 1 spray into each nostril daily (Patient taking differently: 1 spray into each nostril as needed  ), Disp: 16 g, Rfl: 0    fluticasone-vilanterol (BREO ELLIPTA) 100-25 mcg/inh inhaler, Inhale 1 puff daily Rinse mouth after use   (Patient taking differently: Inhale 1 puff as needed Rinse mouth after use  ), Disp: 2 Inhaler, Rfl: 0    gabapentin (NEURONTIN) 300 mg capsule, Take 1 capsule (300 mg total) by mouth daily at bedtime, Disp: 30 capsule, Rfl: 3    ibuprofen (MOTRIN) 200 mg tablet, Take by mouth every 6 (six) hours as needed for mild pain, Disp: , Rfl:     multivitamin (THERAGRAN) TABS, Take 1 tablet by mouth daily, Disp: , Rfl:     norethindrone-ethinyl estradiol (JUNEL FE 1/20) 1-20 MG-MCG per tablet, Take 1 tablet by mouth daily, Disp: 90 tablet, Rfl: 4    ranitidine (ZANTAC) 75 MG tablet, Take 75 mg by mouth as needed  , Disp: , Rfl:     traMADol (ULTRAM) 50 mg tablet, Take 1 tablet (50 mg total) by mouth every 8 (eight) hours as needed for moderate pain, Disp: 21 tablet, Rfl: 0    cefdinir (OMNICEF) 300 mg capsule, Take 1 capsule (300 mg total) by mouth every 12 (twelve) hours for 7 days, Disp: 14 capsule, Rfl: 0    Allergies   Allergen Reactions    No Active Allergies        Social History   Past Surgical History:   Procedure Laterality Date    ACHILLES TENDON LENGTHENING      [place]  DELIVERY ONLY N/A 2016    Procedure:  SECTION (); Surgeon: Brandi Delarosa MD;  Location: [facility] ;  Service: Obstetrics    [place] LAP,CHOLECYSTECTOMY N/A 2019    Procedure: CHOLECYSTECTOMY LAPAROSCOPIC;  Surgeon: Sayra Maharaj DO;  [facility];  Service: General    WISDOM TOOTH EXTRACTION       Family History   Problem Relation Age of Onset    Hypothyroidism Mother     Rheumatic fever Mother     Hypertension Father     Depression Father     Alzheimer's disease Father     ADD / ADHD Brother     Hearing loss Paternal Grandfather     Diabetes Paternal Grandfather     Hypertension Paternal Grandfather        Objective:  /70 (BP Location: Left arm, Patient Position: Sitting, Cuff Size: Large)   Pulse 78   Temp 98 5 °F (36 9 °C) (Tympanic)   Ht 5' 7 36" (1 711 m)   Wt 104 kg (229 lb 12 8 oz)   SpO2 98%   BMI 35 61 kg/m²      Physical Exam   Constitutional: She is oriented to person, place, and time  She appears well-developed and well-nourished  No distress  HENT:   Head: Normocephalic and atraumatic  Right Ear: Hearing, external ear and ear canal normal  Tympanic membrane is erythematous  Left Ear: Hearing, tympanic membrane, external ear and ear canal normal    Nose: Mucosal edema present  No rhinorrhea  Mouth/Throat: Uvula is midline, oropharynx is clear and moist and mucous membranes are normal  No oropharyngeal exudate  Neck: Normal range of motion  Neck supple  Cardiovascular: Normal rate and regular rhythm  Pulmonary/Chest: Effort normal and breath sounds normal  No respiratory distress  She has no wheezes  Lymphadenopathy:     She has cervical adenopathy (R posterior cervical)  Neurological: She is alert and oriented to person, place, and time  Skin: Skin is warm and dry  Psychiatric: She has a normal mood and affect   Her behavior is normal  Judgment and thought content normal    Nursing note and vitals reviewed

## 2020-02-07 ENCOUNTER — TELEPHONE (OUTPATIENT)
Dept: PAIN MEDICINE | Facility: CLINIC | Age: 35
End: 2020-02-07

## 2020-02-07 ENCOUNTER — CONSULT (OUTPATIENT)
Dept: PAIN MEDICINE | Facility: CLINIC | Age: 35
End: 2020-02-07
Payer: COMMERCIAL

## 2020-02-07 VITALS
HEIGHT: 67 IN | SYSTOLIC BLOOD PRESSURE: 101 MMHG | TEMPERATURE: 98.6 F | BODY MASS INDEX: 35.63 KG/M2 | DIASTOLIC BLOOD PRESSURE: 68 MMHG | HEART RATE: 69 BPM | WEIGHT: 227 LBS

## 2020-02-07 DIAGNOSIS — M54.81 BILATERAL OCCIPITAL NEURALGIA: ICD-10-CM

## 2020-02-07 DIAGNOSIS — M54.2 NECK PAIN: Primary | ICD-10-CM

## 2020-02-07 DIAGNOSIS — M62.838 CERVICAL PARASPINAL MUSCLE SPASM: ICD-10-CM

## 2020-02-07 DIAGNOSIS — R51.9 OCCIPITAL HEADACHE: ICD-10-CM

## 2020-02-07 PROCEDURE — 99204 OFFICE O/P NEW MOD 45 MIN: CPT | Performed by: ANESTHESIOLOGY

## 2020-02-07 RX ORDER — GABAPENTIN 100 MG/1
300 CAPSULE ORAL
Qty: 90 CAPSULE | Refills: 1 | Status: SHIPPED | OUTPATIENT
Start: 2020-02-07 | End: 2020-11-04 | Stop reason: ALTCHOICE

## 2020-02-07 NOTE — TELEPHONE ENCOUNTER
Patient called requesting a work note for today's visit  She is requesting this work note be faxed to her employer  Patient would like to be called with status   Please adviseroby  Fax# [phone]      Patient call back# [phone]

## 2020-02-07 NOTE — LETTER
February 7, 2020     Patient: Jesse Price   YOB: 1985   Date of Visit: 2/7/2020       To Whom it May Concern:    Sami Miguel is under my professional care  She was seen in my office on 2/7/2020  She {Return to school/sport/work:[id number]}  If you have any questions or concerns, please don't hesitate to call           Sincerely,          Matias Kaye DO        CC: No Recipients
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
## 2020-02-07 NOTE — LETTER
February 7, 2020     Patient: Alia Mejia   YOB: 1985   Date of Visit: 2/7/2020       To Whom it May Concern:    Lisette Kam is under my professional care  She was seen in my office on 2/7/2020  She may return to work on 02/08/2020  If you have any questions or concerns, please don't hesitate to call           Sincerely,          Ty Mcmahon DO        CC: No Recipients

## 2020-02-20 ENCOUNTER — PROCEDURE VISIT (OUTPATIENT)
Dept: PAIN MEDICINE | Facility: CLINIC | Age: 35
End: 2020-02-20
Payer: COMMERCIAL

## 2020-02-20 VITALS — WEIGHT: 230 LBS | BODY MASS INDEX: 36.1 KG/M2 | HEIGHT: 67 IN

## 2020-02-20 DIAGNOSIS — M54.81 BILATERAL OCCIPITAL NEURALGIA: Primary | ICD-10-CM

## 2020-02-20 PROCEDURE — 64405 NJX AA&/STRD GR OCPL NRV: CPT

## 2020-02-20 RX ORDER — ROPIVACAINE HYDROCHLORIDE 5 MG/ML
30 INJECTION, SOLUTION EPIDURAL; INFILTRATION; PERINEURAL ONCE
Status: COMPLETED | OUTPATIENT
Start: 2020-02-20 | End: 2020-02-20

## 2020-02-20 RX ORDER — METHYLPREDNISOLONE ACETATE 40 MG/ML
40 INJECTION, SUSPENSION INTRA-ARTICULAR; INTRALESIONAL; INTRAMUSCULAR; SOFT TISSUE ONCE
Status: COMPLETED | OUTPATIENT
Start: 2020-02-20 | End: 2020-02-20

## 2020-02-20 RX ADMIN — METHYLPREDNISOLONE ACETATE 40 MG: 40 INJECTION, SUSPENSION INTRA-ARTICULAR; INTRALESIONAL; INTRAMUSCULAR; SOFT TISSUE at 11:37

## 2020-02-20 RX ADMIN — ROPIVACAINE HYDROCHLORIDE 30 ML: 5 INJECTION, SOLUTION EPIDURAL; INFILTRATION; PERINEURAL at 11:38

## 2020-02-20 NOTE — PROGRESS NOTES
Nerve block  Date/Time: 2/20/2020 9:51 AM  Performed by: Felicita Ayala DO  Authorized by: Felicita Ayala DO     Patient location:  Clinic  Other Assisting Provider: No    Consent:     Consent obtained:  Written    Consent given by:  Patient  Universal protocol:     Procedure explained and questions answered to patient or proxy's satisfaction: yes      Site marked: yes      Time out called: yes      Patient identity confirmed:  Verbally with patient  Indications:     Indications:  Pain relief  Location:     Body area:  Head    Head nerve:  Greater occipital    Nerve type:  Peripheral    Laterality:  Bilateral  Pre-procedure details:     Skin preparation:  2% chlorhexidine    Preparation: Patient was prepped and draped in usual sterile fashion    Procedure details (see MAR for exact dosages): Block needle gauge:  25 G        Indication: Occipital headaches  Preoperative diagnosis: Occipital neuralgia  Postoperative diagnosis: Occipital neuralgia  Procedure:  Bilateral greater occipital nerve block    After discussing the risks, benefits, and alternatives to the procedure, the patient expressed understanding and wished to proceed  A procedural pause was conducted to verify: Correct patient identity, procedure to be performed and as applicable, correct side and site, correct patient position, and availability of implants, special equipment or special requirements  The appropriate side of the occiput was sterilely prepped using ChloraPrep  The right occipital artery was palpated, then a belinda was placed 2 cm lateral to the greater occipital protuberance  A 1 5 in 25 gauge needle was used to inject a total of 2 mL of 0 5% ropivacaine and 20 mg of Depo-Medrol after negative aspiration of heme, CSF, or other bodily fluids  The needle was then fanned in the direction of the greater occipital nerve  The procedure was repeated in the exact same way on the opposite side    The patient was discharged with no complications  The patient received a total steroid dose of 40 mg of Depo-Medrol

## 2020-02-25 ENCOUNTER — OFFICE VISIT (OUTPATIENT)
Dept: INTERNAL MEDICINE CLINIC | Age: 35
End: 2020-02-25
Payer: COMMERCIAL

## 2020-02-25 VITALS
BODY MASS INDEX: 33.06 KG/M2 | TEMPERATURE: 98.7 F | HEIGHT: 69 IN | WEIGHT: 223.2 LBS | OXYGEN SATURATION: 97 % | SYSTOLIC BLOOD PRESSURE: 130 MMHG | DIASTOLIC BLOOD PRESSURE: 78 MMHG | HEART RATE: 80 BPM

## 2020-02-25 DIAGNOSIS — R11.0 NAUSEA: ICD-10-CM

## 2020-02-25 DIAGNOSIS — J06.9 UPPER RESPIRATORY TRACT INFECTION, UNSPECIFIED TYPE: Primary | ICD-10-CM

## 2020-02-25 PROCEDURE — 99213 OFFICE O/P EST LOW 20 MIN: CPT | Performed by: INTERNAL MEDICINE

## 2020-02-25 PROCEDURE — 1036F TOBACCO NON-USER: CPT | Performed by: INTERNAL MEDICINE

## 2020-02-25 RX ORDER — ONDANSETRON 4 MG/1
4 TABLET, FILM COATED ORAL EVERY 8 HOURS PRN
Qty: 20 TABLET | Refills: 0 | Status: SHIPPED | OUTPATIENT
Start: 2020-02-25 | End: 2020-11-04 | Stop reason: ALTCHOICE

## 2020-02-25 NOTE — PROGRESS NOTES
Assessment/Plan:  1  Viral upper respiratory tract infection  Advised to take over-the-counter cold medicine  Plenty of fluid and good physical rest  She does have flu vaccination in October 2019    2  Nausea  Secondary to viral URI  Will prescribe Zofran 4 mg 3 times a day as needed  Also advised for liquid diet for next couple of days  She is afebrile  And is stable to go back to work tomorrow  Diagnoses and all orders for this visit:    Upper respiratory tract infection, unspecified type    Nausea  -     ondansetron (ZOFRAN) 4 mg tablet; Take 1 tablet (4 mg total) by mouth every 8 (eight) hours as needed for nausea or vomiting               Subjective:          Patient ID: Symone Ferguson is a 37 y o  female  Came to the office with the complaint of nausea  She noticed nausea about 3 days ago and also episode of diarrhea  Diarrhea improved  Abdominal discomfort also improved but still with mild nausea  Initially she also have fatigue and chills  She is afebrile now  Body fatigue also improved  The following portions of the patient's history were reviewed and updated as appropriate: allergies, current medications, past family history, past medical history, past social history, past surgical history and problem list     Review of Systems   Constitutional: Positive for chills and fatigue  Negative for fever  HENT: Negative for congestion, ear discharge, ear pain, postnasal drip, sinus pressure, sore throat, tinnitus and trouble swallowing  Eyes: Negative for discharge, itching and visual disturbance  Respiratory: Negative for cough and shortness of breath  Cardiovascular: Negative for chest pain and palpitations  Gastrointestinal: Positive for diarrhea and nausea  Negative for abdominal pain and vomiting  Endocrine: Negative for cold intolerance and polyuria  Genitourinary: Negative for difficulty urinating, dysuria and urgency     Musculoskeletal: Negative for arthralgias and neck pain  Skin: Negative for rash  Allergic/Immunologic: Negative for environmental allergies  Neurological: Negative for dizziness, weakness and headaches  Psychiatric/Behavioral: The patient is not nervous/anxious  Past Medical History:   Diagnosis Date    ADHD (attention deficit hyperactivity disorder)     Asthma     sports induced    [address] mal[address] type II [address] [address]     Clostridium difficile infection     Varicella     childhood         Current Outpatient Medications:     cyclobenzaprine (FLEXERIL) 5 mg tablet, Take 1 tablet (5 mg total) by mouth daily at bedtime (Patient taking differently: Take 5 mg by mouth daily at bedtime As needed), Disp: 10 tablet, Rfl: 0    Fluoxetine HCl, PMDD, 10 MG CAPS, Take 1 capsule (10 mg total) by mouth daily, Disp: 30 capsule, Rfl: 5    fluticasone (FLONASE) 50 mcg/act nasal spray, 1 spray into each nostril daily, Disp: 16 g, Rfl: 0    fluticasone-vilanterol (BREO ELLIPTA) 100-25 mcg/inh inhaler, Inhale 1 puff daily Rinse mouth after use   (Patient taking differently: Inhale 1 puff as needed Rinse mouth after use  ), Disp: 2 Inhaler, Rfl: 0    gabapentin (NEURONTIN) 100 mg capsule, Take 3 capsules (300 mg total) by mouth daily at bedtime, Disp: 90 capsule, Rfl: 1    ibuprofen (MOTRIN) 200 mg tablet, Take by mouth every 6 (six) hours as needed for mild pain, Disp: , Rfl:     multivitamin (THERAGRAN) TABS, Take 1 tablet by mouth daily, Disp: , Rfl:     norethindrone-ethinyl estradiol (JUNEL FE 1/20) 1-20 MG-MCG per tablet, Take 1 tablet by mouth daily, Disp: 90 tablet, Rfl: 4    traMADol (ULTRAM) 50 mg tablet, Take 1 tablet (50 mg total) by mouth every 8 (eight) hours as needed for moderate pain, Disp: 21 tablet, Rfl: 0    ondansetron (ZOFRAN) 4 mg tablet, Take 1 tablet (4 mg total) by mouth every 8 (eight) hours as needed for nausea or vomiting, Disp: 20 tablet, Rfl: 0    No Known Allergies    Social History   Past Surgical History:   Procedure Laterality Date    ACHILLES TENDON LENGTHENING      [place]  DELIVERY ONLY N/A 2016    Procedure:  SECTION (); Surgeon: Tenisha Dennis MD;  [facility];  Service: Obstetrics    [place] LAP,CHOLECYSTECTOMY N/A 2019    Procedure: CHOLECYSTECTOMY LAPAROSCOPIC;  Surgeon: Jem Rice DO;  [facility];  Service: General    WISDOM TOOTH EXTRACTION       Family History   Problem Relation Age of Onset    Hypothyroidism Mother     Rheumatic fever Mother     Hypertension Father     Depression Father     Alzheimer's disease Father     ADD / ADHD Brother     Hearing loss Paternal Grandfather     Diabetes Paternal Grandfather     Hypertension Paternal Grandfather        Objective:  /78 (BP Location: Left arm, Patient Position: Sitting, Cuff Size: Large)   Pulse 80   Temp 98 7 °F (37 1 °C) (Tympanic)   Ht 5' 8 5" (1 74 m) Comment: shoes on  Wt 101 kg (223 lb 3 2 oz) Comment: shoes on  SpO2 97%   BMI 33 44 kg/m²   Body mass index is 33 44 kg/m²  Physical Exam   Constitutional: She appears well-developed  HENT:   Head: Normocephalic  Mouth/Throat: Oropharynx is clear and moist    Mild bilateral middle ear effusions noted  No posterior pharyngeal exudate  No sinus tenderness   Eyes: Pupils are equal, round, and reactive to light  No scleral icterus  Neck: Normal range of motion  Neck supple  No tracheal deviation present  No thyromegaly present  Cardiovascular: Normal rate, regular rhythm and normal heart sounds  No murmur heard  Pulmonary/Chest: Effort normal and breath sounds normal  No respiratory distress  She exhibits no tenderness  Abdominal: Soft  Bowel sounds are normal  She exhibits no mass  There is no tenderness  Mild diffuse abdominal tenderness noted   Musculoskeletal: Normal range of motion  Lymphadenopathy:     She has no cervical adenopathy  Neurological: She is alert  No cranial nerve deficit     Skin: Skin is warm  Psychiatric: She has a normal mood and affect

## 2020-02-27 ENCOUNTER — TELEPHONE (OUTPATIENT)
Dept: PAIN MEDICINE | Facility: CLINIC | Age: 35
End: 2020-02-27

## 2020-03-03 ENCOUNTER — OFFICE VISIT (OUTPATIENT)
Dept: PAIN MEDICINE | Facility: CLINIC | Age: 35
End: 2020-03-03
Payer: COMMERCIAL

## 2020-03-03 VITALS
SYSTOLIC BLOOD PRESSURE: 105 MMHG | DIASTOLIC BLOOD PRESSURE: 72 MMHG | HEART RATE: 59 BPM | HEIGHT: 69 IN | BODY MASS INDEX: 33.62 KG/M2 | WEIGHT: 227 LBS

## 2020-03-03 DIAGNOSIS — G89.4 CHRONIC PAIN SYNDROME: Primary | ICD-10-CM

## 2020-03-03 DIAGNOSIS — M54.81 BILATERAL OCCIPITAL NEURALGIA: ICD-10-CM

## 2020-03-03 PROCEDURE — 1036F TOBACCO NON-USER: CPT | Performed by: NURSE PRACTITIONER

## 2020-03-03 PROCEDURE — 99213 OFFICE O/P EST LOW 20 MIN: CPT | Performed by: NURSE PRACTITIONER

## 2020-03-03 NOTE — LETTER
March 3, 2020     Patient: Mari Garcia   YOB: 1985   Date of Visit: 3/3/2020       To Whom it May Concern:    Herrera Raven is under my professional care  She was seen in my office on 3/3/2020  If you have any questions or concerns, please don't hesitate to call           Sincerely,          AKSHAT Green        CC: No Recipients

## 2020-03-10 DIAGNOSIS — M54.81 BILATERAL OCCIPITAL NEURALGIA: ICD-10-CM

## 2020-03-10 DIAGNOSIS — R51.9 OCCIPITAL HEADACHE: ICD-10-CM

## 2020-03-10 RX ORDER — GABAPENTIN 300 MG/1
CAPSULE ORAL
Qty: 90 CAPSULE | Refills: 1 | Status: SHIPPED | OUTPATIENT
Start: 2020-03-10 | End: 2020-11-04 | Stop reason: ALTCHOICE

## 2020-03-12 ENCOUNTER — TELEPHONE (OUTPATIENT)
Dept: PAIN MEDICINE | Facility: MEDICAL CENTER | Age: 35
End: 2020-03-12

## 2020-03-12 NOTE — TELEPHONE ENCOUNTER
S/W pt who states that she forgot to take her gabapentin last night and the night before  Has only been taking it at this dose for about 2-3 weeks  Today felt jittery and "off "  Also had some swelling in the inner part of her eyes  Advised pt to go back on Gabapentin at tonight's dose  Questioned whether she was using more hand  at work, and she stated that she was,  States is using different kinds, like Purell or whatever they can get their hands on as she is a    Advised that this may be the cause of inner eye swelling as this can happen  Advised pt to [facility] tomorrow with any further issues  Pt agreeable      Anything further to tell pt?

## 2020-03-12 NOTE — TELEPHONE ENCOUNTER
Patient is on gabapentin 300 mg 1 at night  She accidentally missed 2 nights in a row , now she don't feel right tremors, hives around the inner eye,feels off  Please call back as soon as you can      Pt can be reached at [phone]

## 2020-05-04 ENCOUNTER — ANNUAL EXAM (OUTPATIENT)
Dept: OBGYN CLINIC | Facility: CLINIC | Age: 35
End: 2020-05-04
Payer: COMMERCIAL

## 2020-05-04 VITALS
DIASTOLIC BLOOD PRESSURE: 72 MMHG | WEIGHT: 229.5 LBS | HEIGHT: 69 IN | BODY MASS INDEX: 33.99 KG/M2 | SYSTOLIC BLOOD PRESSURE: 110 MMHG

## 2020-05-04 DIAGNOSIS — Z01.419 ENCOUNTER FOR WELL WOMAN EXAM: Primary | ICD-10-CM

## 2020-05-04 DIAGNOSIS — Z12.39 ENCOUNTER FOR SCREENING BREAST EXAMINATION: ICD-10-CM

## 2020-05-04 DIAGNOSIS — N94.3 PMS (PREMENSTRUAL SYNDROME): ICD-10-CM

## 2020-05-04 DIAGNOSIS — Z30.41 SURVEILLANCE FOR BIRTH CONTROL, ORAL CONTRACEPTIVES: ICD-10-CM

## 2020-05-04 PROCEDURE — 99395 PREV VISIT EST AGE 18-39: CPT | Performed by: PHYSICIAN ASSISTANT

## 2020-05-04 RX ORDER — NORETHINDRONE ACETATE AND ETHINYL ESTRADIOL 1MG-20(21)
1 KIT ORAL DAILY
Qty: 90 TABLET | Refills: 4 | Status: SHIPPED | OUTPATIENT
Start: 2020-05-04 | End: 2020-11-04 | Stop reason: ALTCHOICE

## 2020-05-04 RX ORDER — FLUOXETINE 10 MG/1
10 CAPSULE ORAL DAILY
Qty: 90 CAPSULE | Refills: 4 | Status: CANCELLED | OUTPATIENT
Start: 2020-05-04

## 2020-05-04 RX ORDER — FLUOXETINE 10 MG/1
TABLET ORAL
Qty: 30 TABLET | Refills: 5 | Status: SHIPPED | OUTPATIENT
Start: 2020-05-04 | End: 2020-10-06 | Stop reason: DRUGHIGH

## 2020-05-04 RX ORDER — FLUOXETINE 10 MG/1
20 TABLET, FILM COATED ORAL DAILY
Qty: 30 TABLET | Refills: 5 | Status: SHIPPED | OUTPATIENT
Start: 2020-05-04 | End: 2020-05-04

## 2020-05-12 ENCOUNTER — TELEPHONE (OUTPATIENT)
Dept: NEUROLOGY | Facility: CLINIC | Age: 35
End: 2020-05-12

## 2020-05-12 ENCOUNTER — TELEMEDICINE (OUTPATIENT)
Dept: NEUROLOGY | Facility: CLINIC | Age: 35
End: 2020-05-12
Payer: COMMERCIAL

## 2020-05-12 DIAGNOSIS — G43.709 CHRONIC MIGRAINE WITHOUT AURA WITHOUT STATUS MIGRAINOSUS, NOT INTRACTABLE: ICD-10-CM

## 2020-05-12 DIAGNOSIS — R51.9 OCCIPITAL HEADACHE: ICD-10-CM

## 2020-05-12 DIAGNOSIS — M54.81 BILATERAL OCCIPITAL NEURALGIA: Primary | ICD-10-CM

## 2020-05-12 DIAGNOSIS — M54.2 NECK PAIN: ICD-10-CM

## 2020-05-12 PROCEDURE — 99214 OFFICE O/P EST MOD 30 MIN: CPT | Performed by: PSYCHIATRY & NEUROLOGY

## 2020-05-12 RX ORDER — RIZATRIPTAN BENZOATE 10 MG/1
10 TABLET ORAL ONCE AS NEEDED
Qty: 9 TABLET | Refills: 0 | Status: SHIPPED | OUTPATIENT
Start: 2020-05-12 | End: 2020-11-04 | Stop reason: ALTCHOICE

## 2020-05-12 RX ORDER — METOCLOPRAMIDE 10 MG/1
10 TABLET ORAL 4 TIMES DAILY
Qty: 20 TABLET | Refills: 0 | Status: SHIPPED | OUTPATIENT
Start: 2020-05-12 | End: 2020-11-04 | Stop reason: ALTCHOICE

## 2020-05-22 DIAGNOSIS — F41.9 ANXIETY: Primary | ICD-10-CM

## 2020-05-22 RX ORDER — FLUOXETINE 10 MG/1
CAPSULE ORAL
Qty: 60 CAPSULE | Refills: 5 | Status: SHIPPED | OUTPATIENT
Start: 2020-05-22 | End: 2020-11-04 | Stop reason: ALTCHOICE

## 2020-05-28 ENCOUNTER — TELEMEDICINE (OUTPATIENT)
Dept: PAIN MEDICINE | Facility: CLINIC | Age: 35
End: 2020-05-28
Payer: COMMERCIAL

## 2020-05-28 DIAGNOSIS — M54.81 BILATERAL OCCIPITAL NEURALGIA: Primary | ICD-10-CM

## 2020-05-28 PROCEDURE — G2012 BRIEF CHECK IN BY MD/QHP: HCPCS | Performed by: NURSE PRACTITIONER

## 2020-08-10 ENCOUNTER — CLINICAL SUPPORT (OUTPATIENT)
Dept: URGENT CARE | Facility: MEDICAL CENTER | Age: 35
End: 2020-08-10

## 2020-08-10 ENCOUNTER — OFFICE VISIT (OUTPATIENT)
Dept: PAIN MEDICINE | Facility: CLINIC | Age: 35
End: 2020-08-10
Payer: COMMERCIAL

## 2020-08-10 ENCOUNTER — TRANSCRIBE ORDERS (OUTPATIENT)
Dept: URGENT CARE | Facility: MEDICAL CENTER | Age: 35
End: 2020-08-10

## 2020-08-10 VITALS — TEMPERATURE: 98.6 F | HEIGHT: 68 IN | WEIGHT: 221 LBS | BODY MASS INDEX: 33.49 KG/M2

## 2020-08-10 DIAGNOSIS — Z02.1 PRE-EMPLOYMENT HEALTH SCREENING EXAMINATION: ICD-10-CM

## 2020-08-10 DIAGNOSIS — M54.81 BILATERAL OCCIPITAL NEURALGIA: ICD-10-CM

## 2020-08-10 DIAGNOSIS — G43.709 CHRONIC MIGRAINE WITHOUT AURA WITHOUT STATUS MIGRAINOSUS, NOT INTRACTABLE: ICD-10-CM

## 2020-08-10 DIAGNOSIS — R20.2 PARESTHESIA OF RIGHT ARM: ICD-10-CM

## 2020-08-10 DIAGNOSIS — R20.2 PARESTHESIA OF LEFT ARM: Primary | ICD-10-CM

## 2020-08-10 DIAGNOSIS — Z02.1 PRE-EMPLOYMENT HEALTH SCREENING EXAMINATION: Primary | ICD-10-CM

## 2020-08-10 DIAGNOSIS — M54.2 NECK PAIN: ICD-10-CM

## 2020-08-10 PROCEDURE — 86787 VARICELLA-ZOSTER ANTIBODY: CPT

## 2020-08-10 PROCEDURE — 3008F BODY MASS INDEX DOCD: CPT | Performed by: ANESTHESIOLOGY

## 2020-08-10 PROCEDURE — 86762 RUBELLA ANTIBODY: CPT

## 2020-08-10 PROCEDURE — 86480 TB TEST CELL IMMUN MEASURE: CPT

## 2020-08-10 PROCEDURE — 86765 RUBEOLA ANTIBODY: CPT

## 2020-08-10 PROCEDURE — 1036F TOBACCO NON-USER: CPT | Performed by: ANESTHESIOLOGY

## 2020-08-10 PROCEDURE — 86735 MUMPS ANTIBODY: CPT

## 2020-08-10 PROCEDURE — 99214 OFFICE O/P EST MOD 30 MIN: CPT | Performed by: ANESTHESIOLOGY

## 2020-08-10 NOTE — PROGRESS NOTES
Assessment  1  Paresthesia of left arm    2  Bilateral occipital neuralgia    3  Neck pain    4  Chronic migraine without aura without status migrainosus, not intractable    5  Paresthesia of right arm        Plan  47-year-old female with a history of occipital neuralgia and migraines returning for follow-up  The patient's occipital headaches have resolved with occipital nerve blocks which were performed February 20, 2020  She is still experiencing relief today  Her main complaint at this point is paresthesias and numbness into her arms and hands  MRI of the cervical spine reveals very small disc bulge at C5-6 without any significant central or foraminal stenosis  She does take ibuprofen p r n  With mild relief  She has discontinue gabapentin since her occipital headaches resolved, but prefers not to restart this considering she had a lot of daytime drowsiness even with only taking a small dose at bedtime  Patient's symptoms are likely secondary to carpal tunnel syndrome  Cervical radiculopathy much less likely considering lack of compressive pathology in the cervical spine  1  I will order an EMG of bilateral upper extremities  2  Patient may continue with ibuprofen p r n  Should not exceed more than 800 mg q 8 hours p r n   3  I will follow up the patient in 4 weeks      My impressions and treatment recommendations were discussed in detail with the patient who verbalized understanding and had no further questions  Discharge instructions were provided  I personally saw and examined the patient and I agree with the above discussed plan of care  No orders of the defined types were placed in this encounter  No orders of the defined types were placed in this encounter  History of Present Illness    Filiberto Ceballos is a 38 y o  female with a history of occipital neuralgia and migraines returning for follow-up    The patient's occipital headaches have resolved with occipital nerve blocks which were performed February 20, 2020  She is still experiencing relief today  Her main complaint at this point is paresthesias and numbness into her arms and hands  She denies any weakness  She denies any recent trauma  She denies any significant neck pain today  MRI of the cervical spine reveals very small disc bulge at C5-6 without any significant central or foraminal stenosis  She does take ibuprofen p r n  With mild relief  She has discontinue gabapentin since her occipital headaches resolved  She did notice a lot of daytime drowsiness with this medication even with only taking a small dose at bedtime  The patient rates her pain a 1/10 on the pain is worse in the evening  The pain is intermittent and described as shooting and pins and needles  The pain is worse with exercise involving her upper extremities  Pain is alleviated with relaxation  Other than as stated above, the patient denies any interval changes in medications, medical condition, mental condition, symptoms, or allergies since the last office visit  I have personally reviewed and/or updated the patient's past medical history, past surgical history, family history, social history, current medications, allergies, and vital signs today  Review of Systems   Constitutional: Negative for fever and unexpected weight change  HENT: Negative for trouble swallowing  Eyes: Negative for visual disturbance  Respiratory: Negative for shortness of breath and wheezing  Cardiovascular: Negative for chest pain and palpitations  Gastrointestinal: Negative for constipation, diarrhea, nausea and vomiting  Endocrine: Negative for cold intolerance, heat intolerance and polydipsia  Genitourinary: Negative for difficulty urinating and frequency  Musculoskeletal: Negative for arthralgias, gait problem, joint swelling and myalgias  Skin: Negative for rash     Neurological: Negative for dizziness, seizures, syncope, weakness and headaches  Hematological: Does not bruise/bleed easily  Psychiatric/Behavioral: Negative for dysphoric mood  All other systems reviewed and are negative  Patient Active Problem List   Diagnosis    Back pain    Mild persistent asthma without complication    Neck pain    Cholelithiasis    Occipital headache    Cervical paraspinal muscle spasm    Chiari malformation type I ([facility] [address]    Class 2 obesity in adult    Bilateral occipital neuralgia    Chronic migraine without aura without status migrainosus, not intractable       Past Medical History:   Diagnosis Date    ADHD (attention deficit hyperactivity disorder)     Asthma     sports induced    Chiari malformation type II ([facility] [address] )     Clostridium difficile infection     Papanicolaou smear for cervical cancer screening 2018    neg    Varicella     childhood       Past Surgical History:   Procedure Laterality Date    ACHILLES TENDON LENGTHENING      IL  DELIVERY ONLY N/A 2016    Procedure:  SECTION ();   Surgeon: Josey Stokes MD;  [facility];  Service: Obstetrics    IL LAP,CHOLECYSTECTOMY N/A 2019    Procedure: CHOLECYSTECTOMY LAPAROSCOPIC;  Surgeon: Benja Garg DO;  [facility];  Service: General    WISDOM TOOTH EXTRACTION         Family History   Problem Relation Age of Onset    Hypothyroidism Mother     Rheumatic fever Mother     Hypertension Father     Depression Father     Alzheimer's disease Father     ADD / ADHD Brother     Diabetes Maternal Grandfather     Hearing loss Paternal Grandfather     Diabetes Paternal Grandfather     Hypertension Paternal Grandfather        Social History     Occupational History    Not on file   Tobacco Use    Smoking status: Never Smoker    Smokeless tobacco: Never Used   Substance and Sexual Activity    Alcohol use: Yes     Comment: social    Drug use: No    Sexual activity: Yes     Partners: Male     Birth control/protection: OCP Current Outpatient Medications on File Prior to Visit   Medication Sig    cyclobenzaprine (FLEXERIL) 5 mg tablet Take 1 tablet (5 mg total) by mouth daily at bedtime    FLUoxetine (PROzac) 10 mg capsule Take 2 (two) capsules by mouth daily    Fluoxetine HCl, PMDD, 10 MG CAPS Take 1 capsule (10 mg total) by mouth daily    Fluoxetine HCl, PMDD, 10 MG TABS TAKE 2 TABLETS BY MOUTH EVERY DAY    fluticasone (FLONASE) 50 mcg/act nasal spray 1 spray into each nostril daily    fluticasone-vilanterol (BREO ELLIPTA) 100-25 mcg/inh inhaler Inhale 1 puff daily Rinse mouth after use   (Patient taking differently: Inhale 1 puff as needed Rinse mouth after use  )    ibuprofen (MOTRIN) 200 mg tablet Take by mouth every 6 (six) hours as needed for mild pain    metoclopramide (REGLAN) 10 mg tablet Take 1 tablet (10 mg total) by mouth 4 (four) times a day    multivitamin (THERAGRAN) TABS Take 1 tablet by mouth daily    ondansetron (ZOFRAN) 4 mg tablet Take 1 tablet (4 mg total) by mouth every 8 (eight) hours as needed for nausea or vomiting    rizatriptan (MAXALT) 10 MG tablet Take 1 tablet (10 mg total) by mouth once as needed for migraine for up to 1 dose May repeat in 2 hours if needed    traMADol (ULTRAM) 50 mg tablet Take 1 tablet (50 mg total) by mouth every 8 (eight) hours as needed for moderate pain    gabapentin (NEURONTIN) 100 mg capsule Take 3 capsules (300 mg total) by mouth daily at bedtime (Patient not taking: Reported on 8/10/2020)    gabapentin (NEURONTIN) 300 mg capsule TAKE 1 CAPSULE BY MOUTH AT BEDTIME (Patient not taking: Reported on 8/10/2020)    norethindrone-ethinyl estradiol (JUNEL FE 1/20) 1-20 MG-MCG per tablet Take 1 tablet by mouth daily (Patient not taking: Reported on 8/10/2020)    norethindrone-ethinyl estradiol (JUNEL FE 1/20) 1-20 MG-MCG per tablet Take 1 tablet by mouth daily (Patient not taking: Reported on 8/10/2020)     No current facility-administered medications on file prior to visit  No Known Allergies    Physical Exam    Temp 98 6 °F (37 °C) (Oral)   Ht 5' 8" (1 727 m)   Wt 100 kg (221 lb)   BMI 33 60 kg/m²     Constitutional: normal, well developed, well nourished, alert, in no distress and non-toxic and no overt pain behavior  Eyes: anicteric  HEENT: grossly intact  Neck: supple, symmetric, trachea midline and no masses   Pulmonary:even and unlabored  Cardiovascular:No edema or pitting edema present  Skin:Normal without rashes or lesions and well hydrated  Psychiatric:Mood and affect appropriate  Neurologic:Cranial Nerves II-XII grossly intact  Musculoskeletal:normal gait  Bilateral cervical paraspinals and trapezii minimally tender to palpation  Bilateral upper extremity strength 5/5 in all muscle groups  Sensation intact to light touch in C5 through T1 dermatomes bilaterally  Negative Spurling's bilaterally  Negative Neer and empty can test bilaterally  Negative Tinel's over bilateral wrists and cubital tunnels      Imaging  Imaging reviewed

## 2020-08-11 LAB — RUBV IGG SERPL IA-ACNC: 160.1 IU/ML

## 2020-08-12 LAB
GAMMA INTERFERON BACKGROUND BLD IA-ACNC: 0.03 IU/ML
M TB IFN-G BLD-IMP: NEGATIVE
M TB IFN-G CD4+ BCKGRND COR BLD-ACNC: -0.01 IU/ML
M TB IFN-G CD4+ BCKGRND COR BLD-ACNC: 0 IU/ML
MITOGEN IGNF BCKGRD COR BLD-ACNC: >10 IU/ML

## 2020-08-13 LAB
MEV IGG SER QL: ABNORMAL
MUV IGG SER QL: NORMAL
VZV IGG SER IA-ACNC: NORMAL

## 2020-08-27 ENCOUNTER — TELEPHONE (OUTPATIENT)
Dept: INTERNAL MEDICINE CLINIC | Facility: CLINIC | Age: 35
End: 2020-08-27

## 2020-08-27 NOTE — TELEPHONE ENCOUNTER
Patient due to start working at [place] [address] on Monday and they told her her blood work did not show if she was immune to measles  She wants to know if we have record of that or she will need to get the vaccine

## 2020-08-27 NOTE — TELEPHONE ENCOUNTER
Pt called and informed that she is not immune to measles   Pt informed that she has to contact [employer] or the department of health for a vaccine as we do not stock here in the office

## 2020-09-10 ENCOUNTER — TELEPHONE (OUTPATIENT)
Dept: PAIN MEDICINE | Facility: MEDICAL CENTER | Age: 35
End: 2020-09-10

## 2020-09-10 ENCOUNTER — TELEPHONE (OUTPATIENT)
Dept: NEUROLOGY | Facility: CLINIC | Age: 35
End: 2020-09-10

## 2020-09-10 NOTE — TELEPHONE ENCOUNTER
Okay to wait    The patient can try calling periodically to see if she can find an earlier appointment secondary to cancellations or ask to be placed on cancellation list

## 2020-09-10 NOTE — TELEPHONE ENCOUNTER
Pt is not able to get in for EMG until november 4th due to having to reschedule     Pt states that she is getting the pins and needle down both her leg   It is more prominent in the right leg    Pt wants to see if it is okay to wait this long     Pt can be reached at  [phone]

## 2020-09-10 NOTE — TELEPHONE ENCOUNTER
Call and left an message that Dr Job Bradford will not be in the office on 10/7/20, and EMG appt need to be reschedule

## 2020-09-10 NOTE — TELEPHONE ENCOUNTER
See below, ok to wait? Will tell pt to be added to waiting list, she had to reschedule her first EMG

## 2020-10-05 ENCOUNTER — TRANSCRIBE ORDERS (OUTPATIENT)
Dept: OBGYN CLINIC | Facility: CLINIC | Age: 35
End: 2020-10-05

## 2020-10-05 DIAGNOSIS — N91.2 AMENORRHEA: Primary | ICD-10-CM

## 2020-10-06 ENCOUNTER — APPOINTMENT (OUTPATIENT)
Dept: LAB | Facility: AMBULARY SURGERY CENTER | Age: 35
End: 2020-10-06
Attending: OBSTETRICS & GYNECOLOGY
Payer: COMMERCIAL

## 2020-10-06 ENCOUNTER — TELEMEDICINE (OUTPATIENT)
Dept: INTERNAL MEDICINE CLINIC | Age: 35
End: 2020-10-06
Payer: COMMERCIAL

## 2020-10-06 DIAGNOSIS — R11.0 NAUSEA: ICD-10-CM

## 2020-10-06 DIAGNOSIS — H00.011 HORDEOLUM EXTERNUM OF RIGHT UPPER EYELID: ICD-10-CM

## 2020-10-06 DIAGNOSIS — N91.2 AMENORRHEA: ICD-10-CM

## 2020-10-06 DIAGNOSIS — R11.0 NAUSEA: Primary | ICD-10-CM

## 2020-10-06 LAB — B-HCG SERPL-ACNC: ABNORMAL MIU/ML

## 2020-10-06 PROCEDURE — 99213 OFFICE O/P EST LOW 20 MIN: CPT | Performed by: INTERNAL MEDICINE

## 2020-10-06 PROCEDURE — 36415 COLL VENOUS BLD VENIPUNCTURE: CPT

## 2020-10-06 PROCEDURE — 84702 CHORIONIC GONADOTROPIN TEST: CPT

## 2020-10-06 PROCEDURE — U0003 INFECTIOUS AGENT DETECTION BY NUCLEIC ACID (DNA OR RNA); SEVERE ACUTE RESPIRATORY SYNDROME CORONAVIRUS 2 (SARS-COV-2) (CORONAVIRUS DISEASE [COVID-19]), AMPLIFIED PROBE TECHNIQUE, MAKING USE OF HIGH THROUGHPUT TECHNOLOGIES AS DESCRIBED BY CMS-2020-01-R: HCPCS | Performed by: INTERNAL MEDICINE

## 2020-10-06 PROCEDURE — 1036F TOBACCO NON-USER: CPT | Performed by: INTERNAL MEDICINE

## 2020-10-07 ENCOUNTER — TELEPHONE (OUTPATIENT)
Dept: OBGYN CLINIC | Facility: CLINIC | Age: 35
End: 2020-10-07

## 2020-10-07 LAB — SARS-COV-2 RNA SPEC QL NAA+PROBE: NOT DETECTED

## 2020-10-08 ENCOUNTER — TELEPHONE (OUTPATIENT)
Dept: INTERNAL MEDICINE CLINIC | Facility: CLINIC | Age: 35
End: 2020-10-08

## 2020-10-12 NOTE — TELEPHONE ENCOUNTER
Patient is calling back in stating that her appt for the nerve test is on 11/4/20  However she is now pregnant  Her GYN Dr said it would be ok to get the testing done, but to follow up with Spine dr  Patient wants to know if this is still ok for her to go in for the test? Please follow up thank you

## 2020-10-13 ENCOUNTER — TELEPHONE (OUTPATIENT)
Dept: OBGYN CLINIC | Facility: CLINIC | Age: 35
End: 2020-10-13

## 2020-10-14 DIAGNOSIS — R11.10 HYPEREMESIS: ICD-10-CM

## 2020-10-14 DIAGNOSIS — R11.10 HYPEREMESIS: Primary | ICD-10-CM

## 2020-10-14 RX ORDER — ONDANSETRON 8 MG/1
8 TABLET, ORALLY DISINTEGRATING ORAL EVERY 12 HOURS PRN
Qty: 20 TABLET | Refills: 0 | Status: SHIPPED | OUTPATIENT
Start: 2020-10-14 | End: 2020-10-15

## 2020-10-14 NOTE — TELEPHONE ENCOUNTER
Patient returning nurses phone call  She would also like our code for [facility] my chart so she can communicate thru [employer]  Please advise,    Thank you

## 2020-10-15 RX ORDER — ONDANSETRON 8 MG/1
8 TABLET, ORALLY DISINTEGRATING ORAL EVERY 12 HOURS PRN
Qty: 20 TABLET | Refills: 0 | Status: SHIPPED | OUTPATIENT
Start: 2020-10-15 | End: 2020-11-20 | Stop reason: SDUPTHER

## 2020-10-15 NOTE — TELEPHONE ENCOUNTER
Please reach back out to patient, please leave a message if she doesn't answer it is her personal phone

## 2020-10-15 NOTE — TELEPHONE ENCOUNTER
Attempted to reach patient  Left voicemail message regarding JW response to EMG  Advised patient that code has been sent to her email and will  in 14 days

## 2020-10-23 ENCOUNTER — INITIAL PRENATAL (OUTPATIENT)
Dept: OBGYN CLINIC | Facility: CLINIC | Age: 35
End: 2020-10-23

## 2020-10-23 VITALS — HEIGHT: 67 IN | WEIGHT: 220 LBS | BODY MASS INDEX: 34.53 KG/M2

## 2020-10-23 DIAGNOSIS — Z71.89 PRENATAL CONSULT: Primary | ICD-10-CM

## 2020-10-23 PROCEDURE — OBC: Performed by: PHYSICIAN ASSISTANT

## 2020-10-28 ENCOUNTER — APPOINTMENT (EMERGENCY)
Dept: ULTRASOUND IMAGING | Facility: HOSPITAL | Age: 35
End: 2020-10-28
Payer: COMMERCIAL

## 2020-10-28 ENCOUNTER — HOSPITAL ENCOUNTER (EMERGENCY)
Facility: HOSPITAL | Age: 35
Discharge: HOME | End: 2020-10-28
Attending: EMERGENCY MEDICINE | Admitting: EMERGENCY MEDICINE
Payer: COMMERCIAL

## 2020-10-28 VITALS
BODY MASS INDEX: 34.46 KG/M2 | HEART RATE: 69 BPM | TEMPERATURE: 98.3 F | RESPIRATION RATE: 18 BRPM | WEIGHT: 219.58 LBS | OXYGEN SATURATION: 99 % | HEIGHT: 67 IN | SYSTOLIC BLOOD PRESSURE: 122 MMHG | DIASTOLIC BLOOD PRESSURE: 70 MMHG

## 2020-10-28 DIAGNOSIS — K59.00 CONSTIPATION: ICD-10-CM

## 2020-10-28 DIAGNOSIS — R10.9 ABDOMINAL PAIN DURING PREGNANCY IN FIRST TRIMESTER: Primary | ICD-10-CM

## 2020-10-28 DIAGNOSIS — O26.891 ABDOMINAL PAIN DURING PREGNANCY IN FIRST TRIMESTER: Primary | ICD-10-CM

## 2020-10-28 DIAGNOSIS — O21.9 NAUSEA AND VOMITING DURING PREGNANCY: ICD-10-CM

## 2020-10-28 LAB
ALBUMIN SERPL BCP-MCNC: 3.4 G/DL (ref 3.5–5)
ALP SERPL-CCNC: 68 U/L (ref 46–116)
ALT SERPL W P-5'-P-CCNC: 20 U/L (ref 12–78)
ANION GAP SERPL CALCULATED.3IONS-SCNC: 11 MMOL/L (ref 4–13)
AST SERPL W P-5'-P-CCNC: 15 U/L (ref 5–45)
B-HCG SERPL-ACNC: ABNORMAL MIU/ML
BACTERIA UR QL AUTO: NORMAL /HPF
BASOPHILS # BLD AUTO: 0.04 THOUSANDS/ΜL (ref 0–0.1)
BASOPHILS NFR BLD AUTO: 0 % (ref 0–1)
BILIRUB SERPL-MCNC: 0.4 MG/DL (ref 0.2–1)
BILIRUB UR QL STRIP: NEGATIVE
BUN SERPL-MCNC: 10 MG/DL (ref 5–25)
CALCIUM ALBUM COR SERPL-MCNC: 9.5 MG/DL (ref 8.3–10.1)
CALCIUM SERPL-MCNC: 9 MG/DL (ref 8.3–10.1)
CHLORIDE SERPL-SCNC: 102 MMOL/L (ref 100–108)
CLARITY UR: CLEAR
CO2 SERPL-SCNC: 24 MMOL/L (ref 21–32)
COLOR UR: YELLOW
CREAT SERPL-MCNC: 0.82 MG/DL (ref 0.6–1.3)
EOSINOPHIL # BLD AUTO: 0.16 THOUSAND/ΜL (ref 0–0.61)
EOSINOPHIL NFR BLD AUTO: 2 % (ref 0–6)
ERYTHROCYTE [DISTWIDTH] IN BLOOD BY AUTOMATED COUNT: 13.8 % (ref 11.6–15.1)
GFR SERPL CREATININE-BSD FRML MDRD: 92 ML/MIN/1.73SQ M
GLUCOSE SERPL-MCNC: 107 MG/DL (ref 65–140)
GLUCOSE UR STRIP-MCNC: NEGATIVE MG/DL
HCT VFR BLD AUTO: 39.7 % (ref 34.8–46.1)
HGB BLD-MCNC: 13.1 G/DL (ref 11.5–15.4)
HGB UR QL STRIP.AUTO: NEGATIVE
IMM GRANULOCYTES # BLD AUTO: 0.03 THOUSAND/UL (ref 0–0.2)
IMM GRANULOCYTES NFR BLD AUTO: 0 % (ref 0–2)
KETONES UR STRIP-MCNC: NEGATIVE MG/DL
LEUKOCYTE ESTERASE UR QL STRIP: ABNORMAL
LIPASE SERPL-CCNC: 158 U/L (ref 73–393)
LYMPHOCYTES # BLD AUTO: 2.44 THOUSANDS/ΜL (ref 0.6–4.47)
LYMPHOCYTES NFR BLD AUTO: 25 % (ref 14–44)
MCH RBC QN AUTO: 28.3 PG (ref 26.8–34.3)
MCHC RBC AUTO-ENTMCNC: 33 G/DL (ref 31.4–37.4)
MCV RBC AUTO: 86 FL (ref 82–98)
MONOCYTES # BLD AUTO: 0.72 THOUSAND/ΜL (ref 0.17–1.22)
MONOCYTES NFR BLD AUTO: 7 % (ref 4–12)
NEUTROPHILS # BLD AUTO: 6.58 THOUSANDS/ΜL (ref 1.85–7.62)
NEUTS SEG NFR BLD AUTO: 66 % (ref 43–75)
NITRITE UR QL STRIP: NEGATIVE
NON-SQ EPI CELLS URNS QL MICRO: NORMAL /HPF
NRBC BLD AUTO-RTO: 0 /100 WBCS
PH UR STRIP.AUTO: 6 [PH] (ref 4.5–8)
PLATELET # BLD AUTO: 248 THOUSANDS/UL (ref 149–390)
PMV BLD AUTO: 10.9 FL (ref 8.9–12.7)
POTASSIUM SERPL-SCNC: 3.8 MMOL/L (ref 3.5–5.3)
PROT SERPL-MCNC: 7.4 G/DL (ref 6.4–8.2)
PROT UR STRIP-MCNC: NEGATIVE MG/DL
RBC # BLD AUTO: 4.63 MILLION/UL (ref 3.81–5.12)
RBC #/AREA URNS AUTO: NORMAL /HPF
SODIUM SERPL-SCNC: 137 MMOL/L (ref 136–145)
SP GR UR STRIP.AUTO: 1.02 (ref 1–1.03)
UROBILINOGEN UR QL STRIP.AUTO: 0.2 E.U./DL
WBC # BLD AUTO: 9.97 THOUSAND/UL (ref 4.31–10.16)
WBC #/AREA URNS AUTO: NORMAL /HPF

## 2020-10-28 PROCEDURE — 99284 EMERGENCY DEPT VISIT MOD MDM: CPT | Performed by: EMERGENCY MEDICINE

## 2020-10-28 PROCEDURE — 96360 HYDRATION IV INFUSION INIT: CPT

## 2020-10-28 PROCEDURE — 85025 COMPLETE CBC W/AUTO DIFF WBC: CPT | Performed by: EMERGENCY MEDICINE

## 2020-10-28 PROCEDURE — 76801 OB US < 14 WKS SINGLE FETUS: CPT

## 2020-10-28 PROCEDURE — 36415 COLL VENOUS BLD VENIPUNCTURE: CPT | Performed by: EMERGENCY MEDICINE

## 2020-10-28 PROCEDURE — 96361 HYDRATE IV INFUSION ADD-ON: CPT

## 2020-10-28 PROCEDURE — 84702 CHORIONIC GONADOTROPIN TEST: CPT | Performed by: EMERGENCY MEDICINE

## 2020-10-28 PROCEDURE — 81001 URINALYSIS AUTO W/SCOPE: CPT

## 2020-10-28 PROCEDURE — 83690 ASSAY OF LIPASE: CPT | Performed by: EMERGENCY MEDICINE

## 2020-10-28 PROCEDURE — 87086 URINE CULTURE/COLONY COUNT: CPT

## 2020-10-28 PROCEDURE — 99284 EMERGENCY DEPT VISIT MOD MDM: CPT

## 2020-10-28 PROCEDURE — 80053 COMPREHEN METABOLIC PANEL: CPT | Performed by: EMERGENCY MEDICINE

## 2020-10-28 RX ORDER — METOCLOPRAMIDE 10 MG/1
10 TABLET ORAL EVERY 8 HOURS PRN
Qty: 30 TABLET | Refills: 0 | Status: SHIPPED | OUTPATIENT
Start: 2020-10-28 | End: 2020-11-07

## 2020-10-28 RX ADMIN — SODIUM CHLORIDE 1000 ML: 0.9 INJECTION, SOLUTION INTRAVENOUS at 11:29

## 2020-10-29 LAB — BACTERIA UR CULT: NORMAL

## 2020-11-04 ENCOUNTER — PROCEDURE VISIT (OUTPATIENT)
Dept: NEUROLOGY | Facility: CLINIC | Age: 35
End: 2020-11-04
Payer: COMMERCIAL

## 2020-11-04 ENCOUNTER — INITIAL PRENATAL (OUTPATIENT)
Dept: OBGYN CLINIC | Facility: CLINIC | Age: 35
End: 2020-11-04
Payer: COMMERCIAL

## 2020-11-04 VITALS — BODY MASS INDEX: 33.36 KG/M2 | DIASTOLIC BLOOD PRESSURE: 78 MMHG | WEIGHT: 213 LBS | SYSTOLIC BLOOD PRESSURE: 128 MMHG

## 2020-11-04 DIAGNOSIS — Z36.89 ENCOUNTER FOR OTHER SPECIFIED ANTENATAL SCREENING: Primary | ICD-10-CM

## 2020-11-04 DIAGNOSIS — R20.2 PARESTHESIA OF RIGHT ARM: ICD-10-CM

## 2020-11-04 DIAGNOSIS — Z3A.10 10 WEEKS GESTATION OF PREGNANCY: ICD-10-CM

## 2020-11-04 DIAGNOSIS — Z11.3 SCREENING FOR STDS (SEXUALLY TRANSMITTED DISEASES): ICD-10-CM

## 2020-11-04 DIAGNOSIS — R20.2 PARESTHESIA OF LEFT ARM: ICD-10-CM

## 2020-11-04 DIAGNOSIS — O21.9 NAUSEA AND VOMITING DURING PREGNANCY: ICD-10-CM

## 2020-11-04 DIAGNOSIS — Z11.8 SCREENING FOR CHLAMYDIAL DISEASE: ICD-10-CM

## 2020-11-04 PROCEDURE — 76801 OB US < 14 WKS SINGLE FETUS: CPT | Performed by: OBSTETRICS & GYNECOLOGY

## 2020-11-04 PROCEDURE — 95886 MUSC TEST DONE W/N TEST COMP: CPT | Performed by: PHYSICAL MEDICINE & REHABILITATION

## 2020-11-04 PROCEDURE — 95911 NRV CNDJ TEST 9-10 STUDIES: CPT | Performed by: PHYSICAL MEDICINE & REHABILITATION

## 2020-11-04 PROCEDURE — PNV: Performed by: OBSTETRICS & GYNECOLOGY

## 2020-11-04 RX ORDER — ONDANSETRON 8 MG/1
8 TABLET, ORALLY DISINTEGRATING ORAL EVERY 8 HOURS PRN
Qty: 20 TABLET | Refills: 2 | Status: SHIPPED | OUTPATIENT
Start: 2020-11-04 | End: 2021-04-28

## 2020-11-06 ENCOUNTER — TELEPHONE (OUTPATIENT)
Dept: OBGYN CLINIC | Facility: CLINIC | Age: 35
End: 2020-11-06

## 2020-11-06 LAB
C TRACH DNA SPEC QL NAA+PROBE: ABNORMAL
N GONORRHOEA DNA SPEC QL NAA+PROBE: ABNORMAL

## 2020-11-11 ENCOUNTER — OFFICE VISIT (OUTPATIENT)
Dept: PAIN MEDICINE | Facility: CLINIC | Age: 35
End: 2020-11-11
Payer: COMMERCIAL

## 2020-11-11 VITALS
WEIGHT: 218 LBS | BODY MASS INDEX: 34.21 KG/M2 | SYSTOLIC BLOOD PRESSURE: 108 MMHG | HEART RATE: 70 BPM | DIASTOLIC BLOOD PRESSURE: 72 MMHG | HEIGHT: 67 IN | TEMPERATURE: 98.8 F

## 2020-11-11 DIAGNOSIS — R20.2 PARESTHESIA OF RIGHT ARM: ICD-10-CM

## 2020-11-11 DIAGNOSIS — R20.2 PARESTHESIA OF LEFT ARM: ICD-10-CM

## 2020-11-11 DIAGNOSIS — M54.81 BILATERAL OCCIPITAL NEURALGIA: Primary | ICD-10-CM

## 2020-11-11 DIAGNOSIS — R51.9 OCCIPITAL HEADACHE: ICD-10-CM

## 2020-11-11 DIAGNOSIS — M62.838 CERVICAL PARASPINAL MUSCLE SPASM: ICD-10-CM

## 2020-11-11 PROCEDURE — 99213 OFFICE O/P EST LOW 20 MIN: CPT | Performed by: NURSE PRACTITIONER

## 2020-11-17 ENCOUNTER — CLINICAL SUPPORT (OUTPATIENT)
Dept: URGENT CARE | Facility: MEDICAL CENTER | Age: 35
End: 2020-11-17
Payer: COMMERCIAL

## 2020-11-17 DIAGNOSIS — Z23 NEED FOR INFLUENZA VACCINATION: Primary | ICD-10-CM

## 2020-11-17 PROCEDURE — 90471 IMMUNIZATION ADMIN: CPT

## 2020-11-17 PROCEDURE — 90662 IIV NO PRSV INCREASED AG IM: CPT

## 2020-11-20 ENCOUNTER — TELEMEDICINE (OUTPATIENT)
Dept: INTERNAL MEDICINE CLINIC | Facility: CLINIC | Age: 35
End: 2020-11-20
Payer: COMMERCIAL

## 2020-11-20 VITALS — HEIGHT: 67 IN | WEIGHT: 218 LBS | BODY MASS INDEX: 34.21 KG/M2

## 2020-11-20 DIAGNOSIS — J06.9 VIRAL UPPER RESPIRATORY TRACT INFECTION: Primary | ICD-10-CM

## 2020-11-20 PROCEDURE — 99213 OFFICE O/P EST LOW 20 MIN: CPT | Performed by: NURSE PRACTITIONER

## 2020-11-20 PROCEDURE — 3008F BODY MASS INDEX DOCD: CPT | Performed by: INTERNAL MEDICINE

## 2020-11-27 ENCOUNTER — LAB (OUTPATIENT)
Dept: LAB | Facility: AMBULARY SURGERY CENTER | Age: 35
End: 2020-11-27
Attending: OBSTETRICS & GYNECOLOGY
Payer: COMMERCIAL

## 2020-11-27 DIAGNOSIS — Z36.89 ENCOUNTER FOR OTHER SPECIFIED ANTENATAL SCREENING: ICD-10-CM

## 2020-11-27 LAB
ABO GROUP BLD: NORMAL
BASOPHILS # BLD AUTO: 0.04 THOUSANDS/ΜL (ref 0–0.1)
BASOPHILS NFR BLD AUTO: 0 % (ref 0–1)
BILIRUB UR QL STRIP: NEGATIVE
BLD GP AB SCN SERPL QL: NEGATIVE
CLARITY UR: NORMAL
COLOR UR: YELLOW
EOSINOPHIL # BLD AUTO: 0.24 THOUSAND/ΜL (ref 0–0.61)
EOSINOPHIL NFR BLD AUTO: 2 % (ref 0–6)
ERYTHROCYTE [DISTWIDTH] IN BLOOD BY AUTOMATED COUNT: 13.7 % (ref 11.6–15.1)
GLUCOSE UR STRIP-MCNC: NEGATIVE MG/DL
HBV SURFACE AG SER QL: NORMAL
HCT VFR BLD AUTO: 40 % (ref 34.8–46.1)
HGB BLD-MCNC: 13.2 G/DL (ref 11.5–15.4)
HGB UR QL STRIP.AUTO: NEGATIVE
IMM GRANULOCYTES # BLD AUTO: 0.04 THOUSAND/UL (ref 0–0.2)
IMM GRANULOCYTES NFR BLD AUTO: 0 % (ref 0–2)
KETONES UR STRIP-MCNC: NEGATIVE MG/DL
LEUKOCYTE ESTERASE UR QL STRIP: NEGATIVE
LYMPHOCYTES # BLD AUTO: 2.66 THOUSANDS/ΜL (ref 0.6–4.47)
LYMPHOCYTES NFR BLD AUTO: 26 % (ref 14–44)
MCH RBC QN AUTO: 28.5 PG (ref 26.8–34.3)
MCHC RBC AUTO-ENTMCNC: 33 G/DL (ref 31.4–37.4)
MCV RBC AUTO: 86 FL (ref 82–98)
MONOCYTES # BLD AUTO: 0.65 THOUSAND/ΜL (ref 0.17–1.22)
MONOCYTES NFR BLD AUTO: 6 % (ref 4–12)
NEUTROPHILS # BLD AUTO: 6.74 THOUSANDS/ΜL (ref 1.85–7.62)
NEUTS SEG NFR BLD AUTO: 66 % (ref 43–75)
NITRITE UR QL STRIP: NEGATIVE
NRBC BLD AUTO-RTO: 0 /100 WBCS
PH UR STRIP.AUTO: 7 [PH]
PLATELET # BLD AUTO: 294 THOUSANDS/UL (ref 149–390)
PMV BLD AUTO: 11.5 FL (ref 8.9–12.7)
PROT UR STRIP-MCNC: NEGATIVE MG/DL
RBC # BLD AUTO: 4.63 MILLION/UL (ref 3.81–5.12)
RH BLD: POSITIVE
RUBV IGG SERPL IA-ACNC: >175 IU/ML
SP GR UR STRIP.AUTO: 1.02 (ref 1–1.03)
SPECIMEN EXPIRATION DATE: NORMAL
UROBILINOGEN UR QL STRIP.AUTO: 0.2 E.U./DL
WBC # BLD AUTO: 10.37 THOUSAND/UL (ref 4.31–10.16)

## 2020-11-27 PROCEDURE — 36415 COLL VENOUS BLD VENIPUNCTURE: CPT

## 2020-11-27 PROCEDURE — 87086 URINE CULTURE/COLONY COUNT: CPT

## 2020-11-27 PROCEDURE — 80081 OBSTETRIC PANEL INC HIV TSTG: CPT

## 2020-11-27 PROCEDURE — 81003 URINALYSIS AUTO W/O SCOPE: CPT

## 2020-11-28 LAB — BACTERIA UR CULT: NORMAL

## 2020-11-29 LAB — HIV 1+2 AB+HIV1 P24 AG SERPL QL IA: NORMAL

## 2020-11-30 LAB — RPR SER QL: NORMAL

## 2020-12-02 ENCOUNTER — ROUTINE PRENATAL (OUTPATIENT)
Dept: OBGYN CLINIC | Facility: CLINIC | Age: 35
End: 2020-12-02
Payer: COMMERCIAL

## 2020-12-02 VITALS — DIASTOLIC BLOOD PRESSURE: 78 MMHG | BODY MASS INDEX: 34.77 KG/M2 | SYSTOLIC BLOOD PRESSURE: 124 MMHG | WEIGHT: 222 LBS

## 2020-12-02 DIAGNOSIS — R82.90 URINE FINDINGS ABNORMAL: ICD-10-CM

## 2020-12-02 DIAGNOSIS — Z36.9 ANTENATAL SCREENING ENCOUNTER: ICD-10-CM

## 2020-12-02 DIAGNOSIS — Z34.82 PRENATAL CARE, SUBSEQUENT PREGNANCY IN SECOND TRIMESTER: Primary | ICD-10-CM

## 2020-12-02 DIAGNOSIS — Z11.8 SPECIAL SCREENING EXAMINATION FOR CHLAMYDIAL DISEASE: ICD-10-CM

## 2020-12-02 DIAGNOSIS — Z11.3 SCREENING EXAMINATION FOR STD (SEXUALLY TRANSMITTED DISEASE): ICD-10-CM

## 2020-12-02 LAB
SL AMB  POCT GLUCOSE, UA: NEGATIVE
SL AMB LEUKOCYTE ESTERASE,UA: NEGATIVE
SL AMB POCT BILIRUBIN,UA: NEGATIVE
SL AMB POCT BLOOD,UA: NEGATIVE
SL AMB POCT KETONES,UA: NEGATIVE
SL AMB POCT NITRITE,UA: NEGATIVE
SL AMB POCT PH,UA: 7
SL AMB POCT SPECIFIC GRAVITY,UA: NEGATIVE
SL AMB POCT URINE PROTEIN: NEGATIVE
SL AMB POCT UROBILINOGEN: NORMAL

## 2020-12-02 PROCEDURE — 87491 CHLMYD TRACH DNA AMP PROBE: CPT | Performed by: PHYSICIAN ASSISTANT

## 2020-12-02 PROCEDURE — 81002 URINALYSIS NONAUTO W/O SCOPE: CPT | Performed by: PHYSICIAN ASSISTANT

## 2020-12-02 PROCEDURE — 87591 N.GONORRHOEAE DNA AMP PROB: CPT | Performed by: PHYSICIAN ASSISTANT

## 2020-12-02 PROCEDURE — PNV: Performed by: PHYSICIAN ASSISTANT

## 2020-12-02 RX ORDER — CETIRIZINE HYDROCHLORIDE 10 MG/1
10 TABLET ORAL DAILY
COMMUNITY

## 2020-12-04 LAB
C TRACH DNA SPEC QL NAA+PROBE: NEGATIVE
N GONORRHOEA DNA SPEC QL NAA+PROBE: NEGATIVE

## 2020-12-31 ENCOUNTER — ROUTINE PRENATAL (OUTPATIENT)
Dept: OBGYN CLINIC | Facility: CLINIC | Age: 35
End: 2020-12-31

## 2020-12-31 VITALS — BODY MASS INDEX: 36.49 KG/M2 | WEIGHT: 233 LBS | SYSTOLIC BLOOD PRESSURE: 122 MMHG | DIASTOLIC BLOOD PRESSURE: 74 MMHG

## 2020-12-31 DIAGNOSIS — Z34.82 PRENATAL CARE, SUBSEQUENT PREGNANCY IN SECOND TRIMESTER: Primary | ICD-10-CM

## 2020-12-31 PROCEDURE — PNV: Performed by: PHYSICIAN ASSISTANT

## 2021-01-20 ENCOUNTER — TELEPHONE (OUTPATIENT)
Dept: PERINATAL CARE | Facility: CLINIC | Age: 36
End: 2021-01-20

## 2021-01-21 ENCOUNTER — ROUTINE PRENATAL (OUTPATIENT)
Dept: PERINATAL CARE | Facility: CLINIC | Age: 36
End: 2021-01-21
Payer: COMMERCIAL

## 2021-01-21 VITALS
DIASTOLIC BLOOD PRESSURE: 69 MMHG | WEIGHT: 239.6 LBS | SYSTOLIC BLOOD PRESSURE: 135 MMHG | BODY MASS INDEX: 37.61 KG/M2 | HEART RATE: 80 BPM | HEIGHT: 67 IN

## 2021-01-21 DIAGNOSIS — Z34.82 PRENATAL CARE, SUBSEQUENT PREGNANCY IN SECOND TRIMESTER: ICD-10-CM

## 2021-01-21 DIAGNOSIS — O09.299 HX OF PREECLAMPSIA, PRIOR PREGNANCY, CURRENTLY PREGNANT: Primary | ICD-10-CM

## 2021-01-21 DIAGNOSIS — Z36.9 ANTENATAL SCREENING ENCOUNTER: ICD-10-CM

## 2021-01-21 DIAGNOSIS — Z36.89 ENCOUNTER FOR FETAL ANATOMIC SURVEY: ICD-10-CM

## 2021-01-21 DIAGNOSIS — Z36.86 ENCOUNTER FOR ANTENATAL SCREENING FOR CERVICAL LENGTH: ICD-10-CM

## 2021-01-21 DIAGNOSIS — O99.212 OBESITY AFFECTING PREGNANCY IN SECOND TRIMESTER: ICD-10-CM

## 2021-01-21 DIAGNOSIS — Z3A.21 21 WEEKS GESTATION OF PREGNANCY: ICD-10-CM

## 2021-01-21 PROBLEM — O09.529 AMA (ADVANCED MATERNAL AGE) MULTIGRAVIDA 35+: Status: ACTIVE | Noted: 2021-01-21

## 2021-01-21 PROBLEM — O34.219 HISTORY OF CESAREAN DELIVERY, ANTEPARTUM: Status: ACTIVE | Noted: 2021-01-21

## 2021-01-21 PROBLEM — J06.9 VIRAL UPPER RESPIRATORY TRACT INFECTION: Status: RESOLVED | Noted: 2020-11-20 | Resolved: 2021-01-21

## 2021-01-21 PROCEDURE — 76817 TRANSVAGINAL US OBSTETRIC: CPT | Performed by: OBSTETRICS & GYNECOLOGY

## 2021-01-21 PROCEDURE — 76811 OB US DETAILED SNGL FETUS: CPT | Performed by: OBSTETRICS & GYNECOLOGY

## 2021-01-21 PROCEDURE — 99242 OFF/OP CONSLTJ NEW/EST SF 20: CPT | Performed by: OBSTETRICS & GYNECOLOGY

## 2021-01-21 RX ORDER — ASPIRIN 81 MG/1
162 TABLET ORAL DAILY
Qty: 60 TABLET | Refills: 5 | Status: SHIPPED | OUTPATIENT
Start: 2021-01-21 | End: 2021-05-25 | Stop reason: HOSPADM

## 2021-01-21 NOTE — LETTER
January 21, 2021     Lamar Carroll PA-C  [address] [address]    Patient: David Humphrey   YOB: 1985   Date of Visit: 1/21/2021       Dear Dr Jennifer Astorga: Thank you for referring Whitley Webster to me for evaluation  Below are my notes for this consultation  If you have questions, please do not hesitate to call me  I look forward to following your patient along with you  Sincerely,        Jie Lu MD        CC: No Recipients  Jie Lu MD  1/21/2021  5:17 PM  Sign when Signing Visit  [address] [address]: Ms Janell Womack was seen today at 21w1d for anatomic survey and cervical length screening ultrasound  See ultrasound report under "OB Procedures" tab  My recommendations are as follows:  1  Although encouraging, even a normal-appearing ultrasound cannot exclude all malformations, or the possibility of a genetic syndrome  We reviewed the availability of screening and testing for aneuploidy, as well as her age-related aneuploidy risk  She declines to pursue screening or diagnostic testing at this time  I recommend re-evaluation of fetal growth and missed anatomy around 32 weeks gestation  150 minutes of exercise per week is recommended, and suggested gestational weight gain is 11-20 pounds  2  The use of low dose aspirin in pregnancy (81-162mg) is recommended in women with a high risk, or multiple moderate risk factors for preeclampsia  Aspirin therapy should be initiated between 12-28 weeks gestation, and is most effective if started prior to 16 weeks gestation, and continued until delivery  Low dose aspirin in pregnancy has been shown to reduce the incidence of preeclampsia in women with risk factors, and has been shown to be safe and without significant maternal or fetal risk   In light of her risk factors which include prior preeclampsia, maternal age, and BMI, I recommend initiating aspirin therapy, which was prescribed today       Please don't hesitate to contact our office with any concerns or questions     -Abilio Fong MD

## 2021-01-21 NOTE — PROGRESS NOTES
Ultrasound Probe Disinfection    A transvaginal ultrasound was performed  Prior to use, disinfection was performed with High Level Disinfection Process ([employer])  Probe serial number [id number]: [id number] was used        [address] [place] [address]  01/21/21  3:56 PM

## 2021-01-21 NOTE — PROGRESS NOTES
[address]: Ms Sergio Villatoro was seen today at 21w1d for anatomic survey and cervical length screening ultrasound  See ultrasound report under "OB Procedures" tab  My recommendations are as follows:  1  Although encouraging, even a normal-appearing ultrasound cannot exclude all malformations, or the possibility of a genetic syndrome  We reviewed the availability of screening and testing for aneuploidy, as well as her age-related aneuploidy risk  She declines to pursue screening or diagnostic testing at this time  I recommend re-evaluation of fetal growth and missed anatomy around 32 weeks gestation  150 minutes of exercise per week is recommended, and suggested gestational weight gain is 11-20 pounds  2  The use of low dose aspirin in pregnancy (81-162mg) is recommended in women with a high risk, or multiple moderate risk factors for preeclampsia  Aspirin therapy should be initiated between 12-28 weeks gestation, and is most effective if started prior to 16 weeks gestation, and continued until delivery  Low dose aspirin in pregnancy has been shown to reduce the incidence of preeclampsia in women with risk factors, and has been shown to be safe and without significant maternal or fetal risk  In light of her risk factors which include prior preeclampsia, maternal age, and BMI, I recommend initiating aspirin therapy, which was prescribed today       Please don't hesitate to contact our office with any concerns or questions     -Madison Garcia MD

## 2021-01-22 ENCOUNTER — TELEPHONE (OUTPATIENT)
Dept: PERINATAL CARE | Facility: OTHER | Age: 36
End: 2021-01-22

## 2021-01-28 ENCOUNTER — ROUTINE PRENATAL (OUTPATIENT)
Dept: OBGYN CLINIC | Facility: CLINIC | Age: 36
End: 2021-01-28

## 2021-01-28 VITALS — SYSTOLIC BLOOD PRESSURE: 130 MMHG | BODY MASS INDEX: 37.12 KG/M2 | DIASTOLIC BLOOD PRESSURE: 80 MMHG | WEIGHT: 237 LBS

## 2021-01-28 DIAGNOSIS — Z3A.22 22 WEEKS GESTATION OF PREGNANCY: Primary | ICD-10-CM

## 2021-01-28 PROCEDURE — PNV: Performed by: OBSTETRICS & GYNECOLOGY

## 2021-01-28 NOTE — PROGRESS NOTES
prior   No complaints  No bleeding positive fetal movement  Normal level 2 ultrasound with posterior placenta  However patient has return for completion of fetal anatomy  COVID 19 vaccine discussed  Patient received flu vaccine this year   scheduled for May 27th at 8:00 a m

## 2021-02-08 ENCOUNTER — TELEPHONE (OUTPATIENT)
Dept: PAIN MEDICINE | Facility: CLINIC | Age: 36
End: 2021-02-08

## 2021-02-08 NOTE — TELEPHONE ENCOUNTER
Patient called to find out is she can still have injections with provider while being 24 wks pregnant       Please advise,    Thank you    [phone]

## 2021-02-23 ENCOUNTER — TELEPHONE (OUTPATIENT)
Dept: OBGYN CLINIC | Facility: CLINIC | Age: 36
End: 2021-02-23

## 2021-02-23 NOTE — TELEPHONE ENCOUNTER
The patient called because she has been having a lot of acid reflux  She has been taking unisom and it does help but she is still having symptoms  She feels a burning in the back of her throat  Yesterday she also had chest pressure/ tightness for an hour during the day, and then again for an hour at night while sitting up  The patient has also been experiencing shortness of breath on and off  She was concerned if she this is something to worry about and if there is anything you can do

## 2021-02-23 NOTE — TELEPHONE ENCOUNTER
Pt needs to try Pepcid, tagamet, tums etc    Would recommend seeing PCP for an actual PPI Rx--nexium, protonix etc

## 2021-02-25 ENCOUNTER — LAB (OUTPATIENT)
Dept: LAB | Facility: AMBULARY SURGERY CENTER | Age: 36
End: 2021-02-25
Payer: COMMERCIAL

## 2021-02-25 ENCOUNTER — ROUTINE PRENATAL (OUTPATIENT)
Dept: OBGYN CLINIC | Facility: CLINIC | Age: 36
End: 2021-02-25

## 2021-02-25 VITALS — BODY MASS INDEX: 37.75 KG/M2 | DIASTOLIC BLOOD PRESSURE: 80 MMHG | WEIGHT: 241 LBS | SYSTOLIC BLOOD PRESSURE: 128 MMHG

## 2021-02-25 DIAGNOSIS — Z34.82 PRENATAL CARE, SUBSEQUENT PREGNANCY IN SECOND TRIMESTER: Primary | ICD-10-CM

## 2021-02-25 DIAGNOSIS — Z34.82 PRENATAL CARE, SUBSEQUENT PREGNANCY IN SECOND TRIMESTER: ICD-10-CM

## 2021-02-25 LAB
BASOPHILS # BLD AUTO: 0.05 THOUSANDS/ΜL (ref 0–0.1)
BASOPHILS NFR BLD AUTO: 0 % (ref 0–1)
EOSINOPHIL # BLD AUTO: 0.4 THOUSAND/ΜL (ref 0–0.61)
EOSINOPHIL NFR BLD AUTO: 3 % (ref 0–6)
ERYTHROCYTE [DISTWIDTH] IN BLOOD BY AUTOMATED COUNT: 12.4 % (ref 11.6–15.1)
GLUCOSE 1H P 50 G GLC PO SERPL-MCNC: 118 MG/DL
HCT VFR BLD AUTO: 36.3 % (ref 34.8–46.1)
HGB BLD-MCNC: 11.8 G/DL (ref 11.5–15.4)
IMM GRANULOCYTES # BLD AUTO: 0.12 THOUSAND/UL (ref 0–0.2)
IMM GRANULOCYTES NFR BLD AUTO: 1 % (ref 0–2)
LYMPHOCYTES # BLD AUTO: 2.56 THOUSANDS/ΜL (ref 0.6–4.47)
LYMPHOCYTES NFR BLD AUTO: 17 % (ref 14–44)
MCH RBC QN AUTO: 28.6 PG (ref 26.8–34.3)
MCHC RBC AUTO-ENTMCNC: 32.5 G/DL (ref 31.4–37.4)
MCV RBC AUTO: 88 FL (ref 82–98)
MONOCYTES # BLD AUTO: 0.75 THOUSAND/ΜL (ref 0.17–1.22)
MONOCYTES NFR BLD AUTO: 5 % (ref 4–12)
NEUTROPHILS # BLD AUTO: 10.9 THOUSANDS/ΜL (ref 1.85–7.62)
NEUTS SEG NFR BLD AUTO: 74 % (ref 43–75)
NRBC BLD AUTO-RTO: 0 /100 WBCS
PLATELET # BLD AUTO: 273 THOUSANDS/UL (ref 149–390)
PMV BLD AUTO: 11.8 FL (ref 8.9–12.7)
RBC # BLD AUTO: 4.12 MILLION/UL (ref 3.81–5.12)
WBC # BLD AUTO: 14.78 THOUSAND/UL (ref 4.31–10.16)

## 2021-02-25 PROCEDURE — 36415 COLL VENOUS BLD VENIPUNCTURE: CPT

## 2021-02-25 PROCEDURE — PNV: Performed by: PHYSICIAN ASSISTANT

## 2021-02-25 PROCEDURE — 86592 SYPHILIS TEST NON-TREP QUAL: CPT

## 2021-02-25 PROCEDURE — 82950 GLUCOSE TEST: CPT

## 2021-02-25 PROCEDURE — 85025 COMPLETE CBC W/AUTO DIFF WBC: CPT

## 2021-02-25 NOTE — PROGRESS NOTES
Patient is having a lot of heart burn and nausea  Patient has tried everything OTC and nothing is helping much  She tried Nexium last night and helped a little  Patient has no bleeding, leaking, or cramping

## 2021-02-26 LAB — RPR SER QL: NORMAL

## 2021-02-26 NOTE — PROGRESS NOTES
Pt having heartburn and refulx--just started nexium, will see if gives relief  +FM  rx given for 1 hour, cbc  PT WANTS TUBAL at time if c/s Home

## 2021-03-25 ENCOUNTER — ULTRASOUND (OUTPATIENT)
Dept: PERINATAL CARE | Facility: CLINIC | Age: 36
End: 2021-03-25
Payer: COMMERCIAL

## 2021-03-25 ENCOUNTER — ROUTINE PRENATAL (OUTPATIENT)
Dept: OBGYN CLINIC | Facility: CLINIC | Age: 36
End: 2021-03-25

## 2021-03-25 VITALS
WEIGHT: 246 LBS | HEART RATE: 71 BPM | BODY MASS INDEX: 38.53 KG/M2 | SYSTOLIC BLOOD PRESSURE: 110 MMHG | DIASTOLIC BLOOD PRESSURE: 60 MMHG

## 2021-03-25 VITALS — DIASTOLIC BLOOD PRESSURE: 80 MMHG | WEIGHT: 244 LBS | BODY MASS INDEX: 38.22 KG/M2 | SYSTOLIC BLOOD PRESSURE: 130 MMHG

## 2021-03-25 DIAGNOSIS — O34.219 HISTORY OF CESAREAN DELIVERY, ANTEPARTUM: Primary | ICD-10-CM

## 2021-03-25 DIAGNOSIS — O09.523 MULTIGRAVIDA OF ADVANCED MATERNAL AGE IN THIRD TRIMESTER: ICD-10-CM

## 2021-03-25 DIAGNOSIS — Z3A.30 30 WEEKS GESTATION OF PREGNANCY: ICD-10-CM

## 2021-03-25 DIAGNOSIS — E66.09 CLASS 2 OBESITY DUE TO EXCESS CALORIES WITHOUT SERIOUS COMORBIDITY WITH BODY MASS INDEX (BMI) OF 35.0 TO 35.9 IN ADULT: ICD-10-CM

## 2021-03-25 DIAGNOSIS — Z3A.30 30 WEEKS GESTATION OF PREGNANCY: Primary | ICD-10-CM

## 2021-03-25 PROBLEM — Z3A.21 21 WEEKS GESTATION OF PREGNANCY: Status: RESOLVED | Noted: 2021-01-21 | Resolved: 2021-03-25

## 2021-03-25 PROCEDURE — 76816 OB US FOLLOW-UP PER FETUS: CPT | Performed by: OBSTETRICS & GYNECOLOGY

## 2021-03-25 PROCEDURE — PNV: Performed by: OBSTETRICS & GYNECOLOGY

## 2021-03-25 PROCEDURE — 99213 OFFICE O/P EST LOW 20 MIN: CPT | Performed by: OBSTETRICS & GYNECOLOGY

## 2021-03-25 NOTE — LETTER
2021     MICAELA Chapman    Patient: Radha Alexander   YOB: 1985   Date of Visit: 3/25/2021       Dear Ms Rashidmen Prophet: Thank you for referring Candie Ramirez to me for evaluation  Below are my notes for this consultation  If you have questions, please do not hesitate to call me  I look forward to following your patient along with you  Sincerely,        Smitha Mustafa MD        CC: No Recipients  Smitha Mustafa MD  3/31/2021  2:29 PM  Sign when Signing Visit  Ob ultrasound and MFM follow-up    Ms Brady Laguna is a 37 yo  patient  Obesity  AMA  Fetal ultrasound at     weeks gestation  to evaluate growth  See Ob procedures in EPIC  Ultrasound:      1  Fetal size = dates, with an accelerated pattern of fetal growth  EFW= 4lb 2 oz = 94% with AC at the 88%      2  No fetal anomalies observed  3  Normal FAVIO  15 0      I reviewed the results of this ultrasound and answered  all the questions  Recommendations:      1  Follow-up ultrasound in 4 weeks to monitor fetal growth and development which was scheduled today  The total time spent on this established patient on the encounter date was 15 minutes  This time included previsit service time of 5 minutes, face-to-face  service time of 5 minutes discussing the results of the ultrasound, medical history, findings and recomendations, and post-service time of 5 minutes  Thank you for referring your patient to our offices  The limitations of ultrasound to detect all anomalies was reviewed and how it is not  a test to rule out aneuploidy  If you have any further questions do not hesitate to contact us as [phone]      Smitha Mustafa MD

## 2021-03-25 NOTE — PROGRESS NOTES
No bleeding, no leaking, no contractions  Given information on fetal kick counts today   scheduled for  at 8 am, posterior placenta

## 2021-03-25 NOTE — PROGRESS NOTES
The patient has nausea occasionally  No vomiting, headache or dizziness  No bleeding or cramping  tdap info given

## 2021-03-31 PROBLEM — Z3A.30 30 WEEKS GESTATION OF PREGNANCY: Status: ACTIVE | Noted: 2021-03-31

## 2021-03-31 NOTE — PROGRESS NOTES
Ob ultrasound and MFM follow-up    Ms Eva Peck is a 39 yo  patient  Obesity  AMA  Fetal ultrasound at     weeks gestation  to evaluate growth  See Ob procedures in EPIC  Ultrasound:      1  Fetal size = dates, with an accelerated pattern of fetal growth  EFW= 4lb 2 oz = 94% with AC at the 88%      2  No fetal anomalies observed  3  Normal FAVIO  15 0      I reviewed the results of this ultrasound and answered  all the questions  Recommendations:      1  Follow-up ultrasound in 4 weeks to monitor fetal growth and development which was scheduled today  The total time spent on this established patient on the encounter date was 15 minutes  This time included previsit service time of 5 minutes, face-to-face  service time of 5 minutes discussing the results of the ultrasound, medical history, findings and recomendations, and post-service time of 5 minutes  Thank you for referring your patient to our offices  The limitations of ultrasound to detect all anomalies was reviewed and how it is not  a test to rule out aneuploidy  If you have any further questions do not hesitate to contact us as [phone]      Sarai Rojas MD

## 2021-04-08 ENCOUNTER — ROUTINE PRENATAL (OUTPATIENT)
Dept: OBGYN CLINIC | Facility: CLINIC | Age: 36
End: 2021-04-08
Payer: COMMERCIAL

## 2021-04-08 ENCOUNTER — TELEPHONE (OUTPATIENT)
Dept: OBGYN CLINIC | Facility: CLINIC | Age: 36
End: 2021-04-08

## 2021-04-08 VITALS — WEIGHT: 247.5 LBS | SYSTOLIC BLOOD PRESSURE: 130 MMHG | BODY MASS INDEX: 38.76 KG/M2 | DIASTOLIC BLOOD PRESSURE: 80 MMHG

## 2021-04-08 DIAGNOSIS — Z3A.32 32 WEEKS GESTATION OF PREGNANCY: ICD-10-CM

## 2021-04-08 DIAGNOSIS — Z34.83 PRENATAL CARE, SUBSEQUENT PREGNANCY IN THIRD TRIMESTER: Primary | ICD-10-CM

## 2021-04-08 PROCEDURE — 90715 TDAP VACCINE 7 YRS/> IM: CPT

## 2021-04-08 PROCEDURE — 90471 IMMUNIZATION ADMIN: CPT

## 2021-04-08 PROCEDURE — PNV: Performed by: PHYSICIAN ASSISTANT

## 2021-04-08 NOTE — PROGRESS NOTES
Patient is due for TDAP injection today  Patient tolerated injection well in RIGHT DELT  Patient does have some swelling in her hand but no headaches  Patient has no bleeding, leaking or cramping        LOT # [id number]  EXP:10/16/22    [place] [address]: 43598-405-10

## 2021-04-08 NOTE — PROGRESS NOTES
Pt is feeling well  +FM  Last EFW baby was 4+lbs at 30 weeks  Pt is a scheduled repeat c/s 5/27 8am  Recd tdap today  Breast pump ordered

## 2021-04-13 ENCOUNTER — TELEPHONE (OUTPATIENT)
Dept: OBGYN CLINIC | Facility: CLINIC | Age: 36
End: 2021-04-13

## 2021-04-13 NOTE — TELEPHONE ENCOUNTER
Pt called stating that she is having cramping and it isn't denise dorantes  She didn't have this cramping in her last pregnancy  She is feeling baby move and she is hydrating  Per Dr Michelle Bran pt advised to go home and rest and hydrate  Call with an update this afternoon  Letter faxed to pt's work place

## 2021-04-14 ENCOUNTER — TELEPHONE (OUTPATIENT)
Dept: OBGYN CLINIC | Facility: CLINIC | Age: 36
End: 2021-04-14

## 2021-04-14 NOTE — TELEPHONE ENCOUNTER
TAMARA    Pt called with an update that she is feeling much better than yesterday  She is no longer having the cramping pain  Pt advised to keep hydrated and call if she has any other concerns

## 2021-04-15 ENCOUNTER — OFFICE VISIT (OUTPATIENT)
Dept: PODIATRY | Facility: CLINIC | Age: 36
End: 2021-04-15
Payer: COMMERCIAL

## 2021-04-15 VITALS
BODY MASS INDEX: 39.24 KG/M2 | HEIGHT: 67 IN | WEIGHT: 250 LBS | DIASTOLIC BLOOD PRESSURE: 80 MMHG | HEART RATE: 89 BPM | SYSTOLIC BLOOD PRESSURE: 117 MMHG

## 2021-04-15 DIAGNOSIS — B35.3 TINEA PEDIS, UNSPECIFIED LATERALITY: ICD-10-CM

## 2021-04-15 DIAGNOSIS — B35.1 ONYCHOMYCOSIS: Primary | ICD-10-CM

## 2021-04-15 PROCEDURE — 99243 OFF/OP CNSLTJ NEW/EST LOW 30: CPT | Performed by: PODIATRIST

## 2021-04-15 RX ORDER — CLOTRIMAZOLE AND BETAMETHASONE DIPROPIONATE 10; .64 MG/G; MG/G
CREAM TOPICAL 2 TIMES DAILY
Qty: 45 G | Refills: 3 | Status: SHIPPED | OUTPATIENT
Start: 2021-04-15

## 2021-04-15 NOTE — LETTER
2021     DO Ciro [address]  [facility]    Patient: Grace Romo   YOB: 1985   Date of Visit: 4/15/2021       Dear Dr Analisa Rodriguez: Thank you for referring Chris Thompson to me for evaluation  Below are my notes for this consultation  If you have questions, please do not hesitate to call me  I look forward to following your patient along with you  Sincerely,        Zaina Toussaint DPM        CC: No Recipients  Shelley Dee  2021  5:26 PM  Signed                 PATIENT:  Grace Romo  1985       ASSESSMENT:     1  Onychomycosis  ciclopirox (PENLAC) 8 % solution   2  Tinea pedis, unspecified laterality  clotrimazole-betamethasone (LOTRISONE) 1-0 05 % cream             PLAN:  1  Patient was counseled and educated on the condition and the diagnosis  2  The diagnosis, treatment options and prognosis were discussed with the patient  3  She is pregnant and will treat her condition with topical medication  4  Rx: Penlac and Lotrisone cream   Discussed proper skin care and protection related to chronic tinea infection  5  Discussed possible oral medication after delivery  6  Patient will return in 5 months  Subjective:     HPI  The patient presents with chief complaint of discoloration on left great toenail  She noticed it for a few weeks  She started OTC antifungal tinctures in the last 2 weeks without resolving the condition  She also feels itching in left foot  She used OTC lamisil cream, but still had itching  Denied any swelling  No associated numbness or paresthesia  No significant weakness  She is pregnant and will have  in about 5 weeks          The following portions of the patient's history were reviewed and updated as appropriate: allergies, current medications, past family history, past medical history, past social history, past surgical history and problem list   All pertinent labs and images were reviewed  Past Medical History  Past Medical History:   Diagnosis Date    ADHD (attention deficit hyperactivity disorder)     Asthma     sports induced    Chiari malformation type II ([facility] [address] )     Clostridium difficile infection     CTS (carpal tunnel syndrome)     Migraine     Papanicolaou smear for cervical cancer screening 2018    neg    Varicella     childhood       Past Surgical History  Past Surgical History:   Procedure Laterality Date    ACHILLES TENDON LENGTHENING      [place]  DELIVERY ONLY N/A 2016    Procedure:  SECTION (); Surgeon: Faraz Coppola MD;  [facility] ;  Service: Obstetrics    [place] LAP,CHOLECYSTECTOMY N/A 2019    Procedure: CHOLECYSTECTOMY LAPAROSCOPIC;  Surgeon: Nadia Correa DO;  [facility];  Service: General    WISDOM TOOTH EXTRACTION          Allergies:  Patient has no known allergies  Medications:  Current Outpatient Medications   Medication Sig Dispense Refill    aspirin (ECOTRIN LOW STRENGTH) 81 mg EC tablet Take 2 tablets (162 mg total) by mouth daily 60 tablet 5    cetirizine (ZyrTEC) 10 mg tablet Take 10 mg by mouth daily      Cholecalciferol 25 MCG (1000 UT) CHEW Chew 1 tablet daily 5000 iu      docusate sodium (COLACE) 50 mg capsule Take by mouth 2 (two) times a day      Doxylamine Succinate, Sleep, (UNISOM PO) Take by mouth      fluticasone-vilanterol (BREO ELLIPTA) 100-25 mcg/inh inhaler Inhale 1 puff daily Rinse mouth after use   2 Inhaler 0    Prenatal Vit-Iron Carbonyl-FA (prenatal multivitamin) TABS Take 1 tablet by mouth daily      ciclopirox (PENLAC) 8 % solution Apply topically daily at bedtime 6 6 mL 5    clotrimazole-betamethasone (LOTRISONE) 1-0 05 % cream Apply topically 2 (two) times a day 45 g 3    Esomeprazole Magnesium (NEXIUM PO) Take by mouth      ondansetron (ZOFRAN-ODT) 8 mg disintegrating tablet Take 1 tablet (8 mg total) by mouth every 8 (eight) hours as needed for nausea or vomiting 20 tablet 2    psyllium (METAMUCIL) 58 6 % packet Take 1 packet by mouth daily for 7 days 7 packet 0    Pyridoxine HCl (VITAMIN B6 PO) Take 1 tablet by mouth daily       No current facility-administered medications for this visit  Social History:  Social History     Socioeconomic History    Marital status: /Civil Union     Spouse name: None    Number of children: 1    Years of education: None    Highest education level: None   Occupational History    None   Social Needs    Financial resource strain: None    Food insecurity     Worry: None     Inability: None    Transportation needs     Medical: None     Non-medical: None   Tobacco Use    Smoking status: Never Smoker    Smokeless tobacco: Never Used   Substance and Sexual Activity    Alcohol use: Not Currently     Comment: social    Drug use: No    Sexual activity: Yes     Partners: Male     Birth control/protection: OCP   Lifestyle    Physical activity     Days per week: None     Minutes per session: None    Stress: None   Relationships    Social connections     Talks on phone: None     Gets together: None     Attends Church service: None     Active member of club or organization: None     Attends meetings of clubs or organizations: None     Relationship status: None    Intimate partner violence     Fear of current or ex partner: None     Emotionally abused: None     Physically abused: None     Forced sexual activity: None   Other Topics Concern    None   Social History Narrative    None          Review of Systems   Constitutional: Negative for appetite change, chills and fever  HENT: Negative for sore throat  Respiratory: Negative for cough and shortness of breath  Cardiovascular: Negative for chest pain  Gastrointestinal: Negative for diarrhea, nausea and vomiting  Musculoskeletal: Negative for gait problem  Skin: Negative for wound  Allergic/Immunologic: Negative for immunocompromised state     Neurological: Negative for weakness and numbness  Hematological: Negative  Psychiatric/Behavioral: Negative for behavioral problems and confusion  Objective:      /80   Pulse 89   Ht 5' 7" (1 702 m) Comment: verbal  Wt 113 kg (250 lb)   LMP 08/26/2020   BMI 39 16 kg/m²          Physical Exam  Vitals signs reviewed  Constitutional:       General: She is not in acute distress  Appearance: Normal appearance  She is not toxic-appearing  HENT:      Head: Normocephalic and atraumatic  Neck:      Musculoskeletal: Normal range of motion and neck supple  Cardiovascular:      Rate and Rhythm: Normal rate and regular rhythm  Pulses: Normal pulses  Dorsalis pedis pulses are 2+ on the right side and 2+ on the left side  Posterior tibial pulses are 2+ on the right side and 2+ on the left side  Pulmonary:      Effort: Pulmonary effort is normal  No respiratory distress  Musculoskeletal: Normal range of motion  General: No swelling, tenderness or signs of injury  Right lower leg: No edema  Left lower leg: No edema  Right foot: No foot drop  Left foot: No foot drop  Skin:     General: Skin is warm  Capillary Refill: Capillary refill takes less than 2 seconds  Coloration: Skin is not cyanotic or mottled  Findings: No abscess, ecchymosis, petechiae or wound  Rash is not purpuric  Nails: There is no clubbing  Comments: Skin peeling and rashes around toes and plantar left foot  Mycotic nail with mild discoloration and thickening of left great toenail   Neurological:      General: No focal deficit present  Mental Status: She is alert and oriented to person, place, and time  Sensory: No sensory deficit  Motor: No weakness  Coordination: Coordination normal       Gait: Gait normal    Psychiatric:         Mood and Affect: Mood normal          Behavior: Behavior normal          Thought Content:  Thought content normal  Judgment: Judgment normal

## 2021-04-15 NOTE — PROGRESS NOTES
PATIENT:  Tete Melgar  1985       ASSESSMENT:     1  Onychomycosis  ciclopirox (PENLAC) 8 % solution   2  Tinea pedis, unspecified laterality  clotrimazole-betamethasone (LOTRISONE) 1-0 05 % cream             PLAN:  1  Patient was counseled and educated on the condition and the diagnosis  2  The diagnosis, treatment options and prognosis were discussed with the patient  3  She is pregnant and will treat her condition with topical medication  4  Rx: Penlac and Lotrisone cream   Discussed proper skin care and protection related to chronic tinea infection  5  Discussed possible oral medication after delivery  6  Patient will return in 5 months  Subjective:     HPI  The patient presents with chief complaint of discoloration on left great toenail  She noticed it for a few weeks  She started OTC antifungal tinctures in the last 2 weeks without resolving the condition  She also feels itching in left foot  She used OTC lamisil cream, but still had itching  Denied any swelling  No associated numbness or paresthesia  No significant weakness  She is pregnant and will have  in about 5 weeks  The following portions of the patient's history were reviewed and updated as appropriate: allergies, current medications, past family history, past medical history, past social history, past surgical history and problem list   All pertinent labs and images were reviewed        Past Medical History  Past Medical History:   Diagnosis Date    ADHD (attention deficit hyperactivity disorder)     Asthma     sports induced    [facility]ari malformation type II ([facility] [address] )     Clostridium difficile infection     CTS (carpal tunnel syndrome)     Migraine     Papanicolaou smear for cervical cancer screening 2018    neg    Varicella     childhood       Past Surgical History  Past Surgical History:   Procedure Laterality Date    ACHILLES TENDON LENGTHENING      [place]  DELIVERY ONLY N/A 2016    Procedure:  SECTION (); Surgeon: Cordell Sahu MD;  [facility] ;  Service: Obstetrics    [place] LAP,CHOLECYSTECTOMY N/A 2019    Procedure: CHOLECYSTECTOMY LAPAROSCOPIC;  Surgeon: Donald Rodriguez DO;  [facility];  Service: General    WISDOM TOOTH EXTRACTION          Allergies:  Patient has no known allergies  Medications:  Current Outpatient Medications   Medication Sig Dispense Refill    aspirin (ECOTRIN LOW STRENGTH) 81 mg EC tablet Take 2 tablets (162 mg total) by mouth daily 60 tablet 5    cetirizine (ZyrTEC) 10 mg tablet Take 10 mg by mouth daily      Cholecalciferol 25 MCG (1000 UT) CHEW Chew 1 tablet daily 5000 iu      docusate sodium (COLACE) 50 mg capsule Take by mouth 2 (two) times a day      Doxylamine Succinate, Sleep, (UNISOM PO) Take by mouth      fluticasone-vilanterol (BREO ELLIPTA) 100-25 mcg/inh inhaler Inhale 1 puff daily Rinse mouth after use  2 Inhaler 0    Prenatal Vit-Iron Carbonyl-FA (prenatal multivitamin) TABS Take 1 tablet by mouth daily      ciclopirox (PENLAC) 8 % solution Apply topically daily at bedtime 6 6 mL 5    clotrimazole-betamethasone (LOTRISONE) 1-0 05 % cream Apply topically 2 (two) times a day 45 g 3    Esomeprazole Magnesium (NEXIUM PO) Take by mouth      ondansetron (ZOFRAN-ODT) 8 mg disintegrating tablet Take 1 tablet (8 mg total) by mouth every 8 (eight) hours as needed for nausea or vomiting 20 tablet 2    psyllium (METAMUCIL) 58 6 % packet Take 1 packet by mouth daily for 7 days 7 packet 0    Pyridoxine HCl (VITAMIN B6 PO) Take 1 tablet by mouth daily       No current facility-administered medications for this visit          Social History:  Social History     Socioeconomic History    Marital status: /Civil Union     Spouse name: None    Number of children: 1    Years of education: None    Highest education level: None   Occupational History    None   Social Needs    Financial resource strain: None    Food insecurity     Worry: None     Inability: None    Transportation needs     Medical: None     Non-medical: None   Tobacco Use    Smoking status: Never Smoker    Smokeless tobacco: Never Used   Substance and Sexual Activity    Alcohol use: Not Currently     Comment: social    Drug use: No    Sexual activity: Yes     Partners: Male     Birth control/protection: OCP   Lifestyle    Physical activity     Days per week: None     Minutes per session: None    Stress: None   Relationships    Social connections     Talks on phone: None     Gets together: None     Attends Rastafari service: None     Active member of club or organization: None     Attends meetings of clubs or organizations: None     Relationship status: None    Intimate partner violence     Fear of current or ex partner: None     Emotionally abused: None     Physically abused: None     Forced sexual activity: None   Other Topics Concern    None   Social History Narrative    None          Review of Systems   Constitutional: Negative for appetite change, chills and fever  HENT: Negative for sore throat  Respiratory: Negative for cough and shortness of breath  Cardiovascular: Negative for chest pain  Gastrointestinal: Negative for diarrhea, nausea and vomiting  Musculoskeletal: Negative for gait problem  Skin: Negative for wound  Allergic/Immunologic: Negative for immunocompromised state  Neurological: Negative for weakness and numbness  Hematological: Negative  Psychiatric/Behavioral: Negative for behavioral problems and confusion  Objective:      /80   Pulse 89   Ht 5' 7" (1 702 m) Comment: verbal  Wt 113 kg (250 lb)   LMP 08/26/2020   BMI 39 16 kg/m²          Physical Exam  Vitals signs reviewed  Constitutional:       General: She is not in acute distress  Appearance: Normal appearance  She is not toxic-appearing  HENT:      Head: Normocephalic and atraumatic     Neck: Musculoskeletal: Normal range of motion and neck supple  Cardiovascular:      Rate and Rhythm: Normal rate and regular rhythm  Pulses: Normal pulses  Dorsalis pedis pulses are 2+ on the right side and 2+ on the left side  Posterior tibial pulses are 2+ on the right side and 2+ on the left side  Pulmonary:      Effort: Pulmonary effort is normal  No respiratory distress  Musculoskeletal: Normal range of motion  General: No swelling, tenderness or signs of injury  Right lower leg: No edema  Left lower leg: No edema  Right foot: No foot drop  Left foot: No foot drop  Skin:     General: Skin is warm  Capillary Refill: Capillary refill takes less than 2 seconds  Coloration: Skin is not cyanotic or mottled  Findings: No abscess, ecchymosis, petechiae or wound  Rash is not purpuric  Nails: There is no clubbing  Comments: Skin peeling and rashes around toes and plantar left foot  Mycotic nail with mild discoloration and thickening of left great toenail   Neurological:      General: No focal deficit present  Mental Status: She is alert and oriented to person, place, and time  Sensory: No sensory deficit  Motor: No weakness  Coordination: Coordination normal       Gait: Gait normal    Psychiatric:         Mood and Affect: Mood normal          Behavior: Behavior normal          Thought Content:  Thought content normal          Judgment: Judgment normal

## 2021-04-19 ENCOUNTER — HOSPITAL ENCOUNTER (OUTPATIENT)
Facility: HOSPITAL | Age: 36
Discharge: HOME | End: 2021-04-19
Attending: OBSTETRICS & GYNECOLOGY | Admitting: OBSTETRICS & GYNECOLOGY
Payer: COMMERCIAL

## 2021-04-19 ENCOUNTER — TELEPHONE (OUTPATIENT)
Dept: OBGYN CLINIC | Facility: CLINIC | Age: 36
End: 2021-04-19

## 2021-04-19 VITALS
HEART RATE: 77 BPM | OXYGEN SATURATION: 97 % | RESPIRATION RATE: 18 BRPM | DIASTOLIC BLOOD PRESSURE: 76 MMHG | SYSTOLIC BLOOD PRESSURE: 131 MMHG | TEMPERATURE: 98.6 F

## 2021-04-19 PROBLEM — Z3A.33 33 WEEKS GESTATION OF PREGNANCY: Status: ACTIVE | Noted: 2021-03-31

## 2021-04-19 LAB
ALBUMIN SERPL BCP-MCNC: 2 G/DL (ref 3.5–5)
ALP SERPL-CCNC: 145 U/L (ref 46–116)
ALT SERPL W P-5'-P-CCNC: 14 U/L (ref 12–78)
ANION GAP SERPL CALCULATED.3IONS-SCNC: 10 MMOL/L (ref 4–13)
AST SERPL W P-5'-P-CCNC: 23 U/L (ref 5–45)
BACTERIA UR QL AUTO: ABNORMAL /HPF
BILIRUB SERPL-MCNC: 0.34 MG/DL (ref 0.2–1)
BILIRUB UR QL STRIP: NEGATIVE
BUN SERPL-MCNC: 7 MG/DL (ref 5–25)
CALCIUM ALBUM COR SERPL-MCNC: 9.9 MG/DL (ref 8.3–10.1)
CALCIUM SERPL-MCNC: 8.3 MG/DL (ref 8.3–10.1)
CHLORIDE SERPL-SCNC: 102 MMOL/L (ref 100–108)
CLARITY UR: CLEAR
CO2 SERPL-SCNC: 23 MMOL/L (ref 21–32)
COLOR UR: YELLOW
CREAT SERPL-MCNC: 0.68 MG/DL (ref 0.6–1.3)
CREAT UR-MCNC: 87 MG/DL
ERYTHROCYTE [DISTWIDTH] IN BLOOD BY AUTOMATED COUNT: 12.7 % (ref 11.6–15.1)
GFR SERPL CREATININE-BSD FRML MDRD: 112 ML/MIN/1.73SQ M
GLUCOSE SERPL-MCNC: 78 MG/DL (ref 65–140)
GLUCOSE UR STRIP-MCNC: NEGATIVE MG/DL
HCT VFR BLD AUTO: 35.9 % (ref 34.8–46.1)
HGB BLD-MCNC: 11.4 G/DL (ref 11.5–15.4)
HGB UR QL STRIP.AUTO: NEGATIVE
KETONES UR STRIP-MCNC: NEGATIVE MG/DL
LEUKOCYTE ESTERASE UR QL STRIP: NEGATIVE
MCH RBC QN AUTO: 25.9 PG (ref 26.8–34.3)
MCHC RBC AUTO-ENTMCNC: 31.8 G/DL (ref 31.4–37.4)
MCV RBC AUTO: 81 FL (ref 82–98)
NITRITE UR QL STRIP: NEGATIVE
NON-SQ EPI CELLS URNS QL MICRO: ABNORMAL /HPF
PH UR STRIP.AUTO: 6.5 [PH]
PLATELET # BLD AUTO: 249 THOUSANDS/UL (ref 149–390)
PMV BLD AUTO: 12 FL (ref 8.9–12.7)
POTASSIUM SERPL-SCNC: 4.3 MMOL/L (ref 3.5–5.3)
PROT SERPL-MCNC: 7.1 G/DL (ref 6.4–8.2)
PROT UR STRIP-MCNC: NEGATIVE MG/DL
PROT UR-MCNC: 19 MG/DL
PROT/CREAT UR: 0.22 MG/G{CREAT} (ref 0–0.1)
RBC # BLD AUTO: 4.41 MILLION/UL (ref 3.81–5.12)
RBC #/AREA URNS AUTO: ABNORMAL /HPF
SODIUM SERPL-SCNC: 135 MMOL/L (ref 136–145)
SP GR UR STRIP.AUTO: 1.01 (ref 1–1.03)
UROBILINOGEN UR QL STRIP.AUTO: 0.2 E.U./DL
WBC # BLD AUTO: 14.29 THOUSAND/UL (ref 4.31–10.16)
WBC #/AREA URNS AUTO: ABNORMAL /HPF

## 2021-04-19 PROCEDURE — 85027 COMPLETE CBC AUTOMATED: CPT | Performed by: OBSTETRICS & GYNECOLOGY

## 2021-04-19 PROCEDURE — 84156 ASSAY OF PROTEIN URINE: CPT | Performed by: OBSTETRICS & GYNECOLOGY

## 2021-04-19 PROCEDURE — 80053 COMPREHEN METABOLIC PANEL: CPT | Performed by: OBSTETRICS & GYNECOLOGY

## 2021-04-19 PROCEDURE — 93005 ELECTROCARDIOGRAM TRACING: CPT

## 2021-04-19 PROCEDURE — 82570 ASSAY OF URINE CREATININE: CPT | Performed by: OBSTETRICS & GYNECOLOGY

## 2021-04-19 PROCEDURE — 99213 OFFICE O/P EST LOW 20 MIN: CPT

## 2021-04-19 PROCEDURE — 81001 URINALYSIS AUTO W/SCOPE: CPT | Performed by: OBSTETRICS & GYNECOLOGY

## 2021-04-19 PROCEDURE — NC001 PR NO CHARGE: Performed by: OBSTETRICS & GYNECOLOGY

## 2021-04-19 RX ORDER — ACETAMINOPHEN 325 MG/1
650 TABLET ORAL EVERY 6 HOURS PRN
Status: DISCONTINUED | OUTPATIENT
Start: 2021-04-19 | End: 2021-04-19 | Stop reason: HOSPADM

## 2021-04-19 RX ORDER — CALCIUM CARBONATE 200(500)MG
500 TABLET,CHEWABLE ORAL DAILY PRN
Status: DISCONTINUED | OUTPATIENT
Start: 2021-04-19 | End: 2021-04-19 | Stop reason: HOSPADM

## 2021-04-19 RX ADMIN — CALCIUM CARBONATE (ANTACID) CHEW TAB 500 MG 500 MG: 500 CHEW TAB at 18:10

## 2021-04-19 RX ADMIN — ACETAMINOPHEN 650 MG: 325 TABLET, FILM COATED ORAL at 18:10

## 2021-04-19 NOTE — H&P
OB TRIAGE H&P  Denia Rowland 38 y o  female MRN: [id number]  Unit/Bed#:  TRIAGE 5       Chief Compliant:   Chief Complaint   Patient presents with    Headache    Chest Pain    Hypertension       A/P:  at 33w5d here for chest pain, mid thoracic back pain, and HTN  After evaluation, chest pain likely 2/2 gastric reflux vs MSK, less likely cardiac given presentation and negative EGK  Fetal testing reassuring  · Discharge home with precautions  Instructions reviewed with patient  Dr Raj Keys aware  EH: Estimated Date of Delivery: 21    HPI: 38 y o   at 33w5d with c/o chest pain, mid thoracic back pain, and HTN  She  denies having uterine contractions and denies fever, no LOF, and no VB  She states that the baby moves as usual     OB Cx: none    Vitals:   Vitals:    21 1755   BP: 137/88   Pulse: 83   Resp: 18   Temp:    SpO2:        Physical Exam:  General: AAOX3  No acute distress  Cardiovascular: Regular, Rate and Rhythm, no murmur, gallop or rub  Lungs: Clear to Auscultation Bilaterally, no wheezing, rhonchi or rales   Abdominal: Soft  NT/ND  Gravid    FHT:  135 / Moderate 6 - 25 bpm / moderate variability, no decels    [place]: none    Prenatal labs: I have personally reviewed pertinent reports      Blood Type:   Lab Results   Component Value Date/Time    ABO Grouping O 2020 10:17 AM     D (Rh type):   Lab Results   Component Value Date/Time    Rh Factor Positive 2020 10:17 AM     HCT/HGB:   Lab Results   Component Value Date/Time    Hematocrit 36 3 2021 04:34 PM    Hematocrit 41 7 2017 09:48 AM    Hemoglobin 11 8 2021 04:34 PM    Hemoglobin 13 8 2017 09:48 AM     MCV:   Lab Results   Component Value Date/Time    MCV 88 2021 04:34 PM    MCV 81 9 2017 09:48 AM     Platelets:   Lab Results   Component Value Date/Time    Platelets 151  04:34 PM    Platelets 793  09:48 AM     1 hour Glucola:   Lab Results Component Value Date/Time    Glucose 118 02/25/2021 04:34 PM     Varicella:   Lab Results   Component Value Date/Time    Varicella IgG IMMUNE 08/10/2020 06:03 PM     Rubella:   Lab Results   Component Value Date/Time    Rubella IgG Quant >175 0 11/27/2020 10:17 AM     VDRL/RPR:   Lab Results   Component Value Date/Time    RPR Non-Reactive 02/25/2021 04:34 PM     Urine Culture/Screen:   Lab Results   Component Value Date/Time    Urine Culture 30,000-39,000 cfu/ml  11/27/2020 10:17 AM     Hep B:   Lab Results   Component Value Date/Time    Hepatitis B Surface Ag Non-reactive 11/27/2020 10:17 AM     HIV:   Lab Results   Component Value Date/Time    HIV-1/HIV-2 Ab Non-Reactive 11/27/2020 10:17 AM      Gonorrhea:   Lab Results   Component Value Date/Time    N gonorrhoeae, DNA Probe Negative 12/02/2020 09:23 AM    N gonorrhoeae, DNA Probe N  gonorrhoeae Amplified DNA Negative 01/29/2016 09:48 AM     Group B Strep:  No results found for: Fanny Angelucci, M D   PGY-1, Family Medicine  04/19/21  6:11 PM

## 2021-04-19 NOTE — TELEPHONE ENCOUNTER
Pt called stating that she is having cramping like pain on her chest between shoulder blades also  No vision changes, light headedness, swelling or headaches  Her BPs today were all done in left arm 8:15A 115/84, 9:23A 152/88, 11:55A 142/88  3:16P 138/88  She does have an appt on Weds 4/21/2021  Please advise

## 2021-04-20 LAB
ATRIAL RATE: 84 BPM
P AXIS: 27 DEGREES
PR INTERVAL: 156 MS
QRS AXIS: 46 DEGREES
QRSD INTERVAL: 74 MS
QT INTERVAL: 358 MS
QTC INTERVAL: 423 MS
T WAVE AXIS: 23 DEGREES
VENTRICULAR RATE: 84 BPM

## 2021-04-20 PROCEDURE — 93010 ELECTROCARDIOGRAM REPORT: CPT | Performed by: INTERNAL MEDICINE

## 2021-04-21 ENCOUNTER — ROUTINE PRENATAL (OUTPATIENT)
Dept: OBGYN CLINIC | Facility: CLINIC | Age: 36
End: 2021-04-21
Payer: COMMERCIAL

## 2021-04-21 VITALS — BODY MASS INDEX: 38.84 KG/M2 | SYSTOLIC BLOOD PRESSURE: 160 MMHG | WEIGHT: 248 LBS | DIASTOLIC BLOOD PRESSURE: 68 MMHG

## 2021-04-21 DIAGNOSIS — Z3A.34 34 WEEKS GESTATION OF PREGNANCY: Primary | ICD-10-CM

## 2021-04-21 PROCEDURE — 59025 FETAL NON-STRESS TEST: CPT | Performed by: OBSTETRICS & GYNECOLOGY

## 2021-04-21 PROCEDURE — PNV: Performed by: OBSTETRICS & GYNECOLOGY

## 2021-04-21 PROCEDURE — 76815 OB US LIMITED FETUS(S): CPT | Performed by: OBSTETRICS & GYNECOLOGY

## 2021-04-21 NOTE — PROGRESS NOTES
Patient scheduled for repeat section and May  No leaking no bleeding positive fetal movement  Patient sits all day at work and has noticed increased swelling of her labia  Encouraged to lie down on the left side at the end of the day and at lunch if she can  Also recommended full support panty hose  Patient received her Tdap vaccine  Patient is scheduled next week for estimated fetal weight at  Center  NST today was reactive without contractions  FAVIO was 22 cm  Breech presentation with the head on maternal right  Plan:  NST in 2 weeks with fluid scan    GBS culture next visit

## 2021-04-28 ENCOUNTER — ULTRASOUND (OUTPATIENT)
Dept: PERINATAL CARE | Facility: CLINIC | Age: 36
End: 2021-04-28
Payer: COMMERCIAL

## 2021-04-28 VITALS
DIASTOLIC BLOOD PRESSURE: 77 MMHG | BODY MASS INDEX: 39.46 KG/M2 | SYSTOLIC BLOOD PRESSURE: 130 MMHG | HEART RATE: 97 BPM | WEIGHT: 251.4 LBS | HEIGHT: 67 IN

## 2021-04-28 DIAGNOSIS — Z36.2 ENCOUNTER FOR FOLLOW-UP ULTRASOUND OF FETAL ANATOMY: ICD-10-CM

## 2021-04-28 DIAGNOSIS — Z36.89 ENCOUNTER FOR ULTRASOUND TO CHECK FETAL GROWTH: ICD-10-CM

## 2021-04-28 DIAGNOSIS — O09.523 MULTIGRAVIDA OF ADVANCED MATERNAL AGE IN THIRD TRIMESTER: Primary | ICD-10-CM

## 2021-04-28 DIAGNOSIS — O34.219 HISTORY OF CESAREAN DELIVERY, ANTEPARTUM: ICD-10-CM

## 2021-04-28 PROCEDURE — 99213 OFFICE O/P EST LOW 20 MIN: CPT | Performed by: OBSTETRICS & GYNECOLOGY

## 2021-04-28 PROCEDURE — 76816 OB US FOLLOW-UP PER FETUS: CPT | Performed by: OBSTETRICS & GYNECOLOGY

## 2021-04-28 NOTE — PATIENT INSTRUCTIONS
Thank you for choosing us for your  care today  If you have any questions about your ultrasound or care, please do not hesitate to contact us or your primary obstetrician  Some general instructions for your pregnancy are:     Protect against coronavirus: Continue to practice social distancing, wear a mask, and wash your hands often  Pregnant women are increased risk of severe COVID  Notify your primary care doctor if you have any symptoms including cough, shortness of breath or difficulty breathing, fever, chills, muscle pain, sore throat, or loss of taste or smell  Pregnant women can receive the coronavirus vaccine   Exercise: Aim for 22 minutes per day (150 minutes per week) of regular exercise  Walking is great!  Nutrition: aim for calcium-rich and iron-rich foods as well as healthy sources of protein   Protect against the flu: get yourself and your entire household vaccinated against influenza  This will protect your baby   Learn about Preeclampsia: preeclampsia is a common, serious high blood pressure complication in pregnancy  A blood pressure of 179QFPK (systolic or top number) or 84SQXT (diastolic or bottom number) is not normal and needs evaluation by your doctor   If you smoke, try to reduce how many cigarettes you smoke or try to quit completely  Do not vape   Other warning signs to watch out for in pregnancy or postpartum: chest pain, obstructed breathing or shortness of breath, seizures, thoughts of hurting yourself or your baby, bleeding, a painful or swollen leg, fever, or headache (see [facility] POST-BIRTH Warning Signs campaign)  If these happen call 911  Itching is also not normal in pregnancy and if you experience this, especially over your hands and feet, potentially worse at night, notify your doctors     Lastly, if you are contacted regarding participation in a survey about your experience in our office, please know that we take any feedback you provide seriously and use it to improve how we deliver care through our center

## 2021-04-28 NOTE — PROGRESS NOTES
[address]: Ms Drew Pace was seen today at 35w0d for fetal growth assessment ultrasound  See ultrasound report under "OB Procedures" tab  Please don't hesitate to contact our office with any concerns or questions    Jose Luque MD 18-Mar-2021 19:19

## 2021-04-28 NOTE — LETTER
April 30, 2021     Rowena Moore MD  [address] [place] [id number]    Patient: Denia Rowland   YOB: 1985   Date of Visit: 4/28/2021       Dear Dr Raj Keys: Thank you for referring Juany Woods to me for evaluation  Below are my notes for this consultation  Her blood pressure with us was not elevated  If you have questions, please do not hesitate to call me  Sincerely,        Nirmal Booker MD        CC: No Recipients  Nirmal Booker MD  4/28/2021  5:31 PM  Sign when Signing Visit  [address]: Ms Travis Wynn was seen today at 35w0d for fetal growth assessment ultrasound  See ultrasound report under "OB Procedures" tab  Please don't hesitate to contact our office with any concerns or questions    Nirmal Booker MD

## 2021-05-01 ENCOUNTER — NURSE TRIAGE (OUTPATIENT)
Dept: OTHER | Facility: OTHER | Age: 36
End: 2021-05-01

## 2021-05-01 NOTE — TELEPHONE ENCOUNTER
Regarding: Swelling of the Legs  ----- Message from Sonali Klein sent at 5/1/2021 10:34 AM EDT -----  " I noticed my legs were starting to swell and have a headache   I am elevating my feet and it is not helping and I previously had preeclampsia "

## 2021-05-01 NOTE — TELEPHONE ENCOUNTER
Reason for Disposition   MILD swelling of both ankles (i e , pedal edema)   [1] Headache AND [2] has not taken pain medications    Answer Assessment - Initial Assessment Questions  1  ONSET: "When did the swelling start?" (e g , minutes, hours, days)      Noticed swelling yesterday and this morning  2  LOCATION: "What part of the leg is swollen?"  "Are both legs swollen or just one leg?"      Yesterday swelling was only in one ankle but today is both ankles  3  SEVERITY: "How bad is the swelling?" (e g , localized; mild, moderate, severe)   - Localized - small area of swelling localized to one leg   - MILD pedal edema - swelling limited to foot and ankle, pitting edema < 1/4 inch (6 mm) deep, rest and elevation eliminate most or all swelling   - MODERATE edema - swelling of lower leg to knee, pitting edema > 1/4 inch (6 mm) deep, rest and elevation only partially reduce swelling   - SEVERE edema - swelling extends above knee, facial or hand swelling present       Mild- swelling in ankles and feet  4  REDNESS: "Does the swelling look red or infected?"      Slight    5  PAIN: "Is the swelling painful to touch?" If so, ask: "How painful is it?"   (Scale 1-10; mild, moderate or severe)      No pain  6  FEVER: "Do you have a fever?" If so, ask: "What is it, how was it measured, and when did it start?"       No fever  7  CAUSE: "What do you think is causing the leg swelling?"      Was up on her feet for a few hours this morning  8  MEDICAL HISTORY: "Do you have a history of blood clots, heart failure, kidney disease, liver failure, or cancer?"      History of preeclampsia in prior pregnancy  9  OTHER SYMPTOMS: "Do you have any other symptoms?" (e g , chest pain, difficulty breathing)      Says she just developed a headache within the last hr that she rates at 7/10  Has not taken any Tylenol to relieve the headache  BP right now is 110/86  No other symptoms       10  EH: "What date are you expecting to deliver?"        Scheduled Csection for May 27th  Protocols used: PREGNANCY - LEG SWELLING AND EDEMA-ADULT-AH, PREGNANCY - HEADACHE-ADULT-AH    Home care advice discussed with pt however wanted to run pts symptoms by on call provider since pt has hx of pre-eclampsia in prior pregnancy  Per Dr Lewis Petit-  Pt should rest on left side, recheck BP in 2 hrs & call back with update  If pt should have a headache or develop abdominal pain that continues then she should head to L&D  Discussed with Laurie who verbalized understanding

## 2021-05-04 ENCOUNTER — TELEPHONE (OUTPATIENT)
Dept: OBGYN CLINIC | Facility: CLINIC | Age: 36
End: 2021-05-04

## 2021-05-04 NOTE — TELEPHONE ENCOUNTER
rec rest---hydrate---BP is normal  Ok to keep appt for tomorrow    Advise us of any BPS that are 130s/80s +

## 2021-05-05 ENCOUNTER — ROUTINE PRENATAL (OUTPATIENT)
Dept: OBGYN CLINIC | Facility: CLINIC | Age: 36
End: 2021-05-05
Payer: COMMERCIAL

## 2021-05-05 VITALS — SYSTOLIC BLOOD PRESSURE: 140 MMHG | BODY MASS INDEX: 39.78 KG/M2 | WEIGHT: 254 LBS | DIASTOLIC BLOOD PRESSURE: 84 MMHG

## 2021-05-05 DIAGNOSIS — Z34.83 PRENATAL CARE, SUBSEQUENT PREGNANCY IN THIRD TRIMESTER: Primary | ICD-10-CM

## 2021-05-05 DIAGNOSIS — Z36.85 ANTENATAL SCREENING FOR STREPTOCOCCUS B: ICD-10-CM

## 2021-05-05 DIAGNOSIS — Z3A.33 33 WEEKS GESTATION OF PREGNANCY: ICD-10-CM

## 2021-05-05 DIAGNOSIS — R51.9 NONINTRACTABLE HEADACHE, UNSPECIFIED CHRONICITY PATTERN, UNSPECIFIED HEADACHE TYPE: ICD-10-CM

## 2021-05-05 PROCEDURE — 59025 FETAL NON-STRESS TEST: CPT | Performed by: PHYSICIAN ASSISTANT

## 2021-05-05 PROCEDURE — 87150 DNA/RNA AMPLIFIED PROBE: CPT | Performed by: PHYSICIAN ASSISTANT

## 2021-05-05 PROCEDURE — PNV: Performed by: PHYSICIAN ASSISTANT

## 2021-05-05 RX ORDER — BUTALBITAL, ACETAMINOPHEN AND CAFFEINE 300; 40; 50 MG/1; MG/1; MG/1
1 CAPSULE ORAL 2 TIMES DAILY
Qty: 30 CAPSULE | Refills: 1 | Status: SHIPPED | OUTPATIENT
Start: 2021-05-05 | End: 2022-02-23

## 2021-05-05 NOTE — PROGRESS NOTES
Patient is doing well  No bleeding or leaking  Patient is having swelling more in her left leg  Patients blood pressure this a m  was 150/92   Yesterday patient has headaches and dizziness yesterday that has gotten better with rest

## 2021-05-06 ENCOUNTER — TELEPHONE (OUTPATIENT)
Dept: OBGYN CLINIC | Facility: CLINIC | Age: 36
End: 2021-05-06

## 2021-05-06 ENCOUNTER — HOSPITAL ENCOUNTER (OUTPATIENT)
Facility: HOSPITAL | Age: 36
Discharge: HOME | End: 2021-05-06
Attending: OBSTETRICS & GYNECOLOGY | Admitting: OBSTETRICS & GYNECOLOGY
Payer: COMMERCIAL

## 2021-05-06 VITALS — TEMPERATURE: 99.1 F | WEIGHT: 254 LBS | RESPIRATION RATE: 18 BRPM | HEIGHT: 67 IN | BODY MASS INDEX: 39.87 KG/M2

## 2021-05-06 DIAGNOSIS — O21.9 NAUSEA AND VOMITING DURING PREGNANCY: Primary | ICD-10-CM

## 2021-05-06 LAB
HOLD SPECIMEN: NORMAL
HOLD SPECIMEN: NORMAL

## 2021-05-06 PROCEDURE — 96361 HYDRATE IV INFUSION ADD-ON: CPT

## 2021-05-06 PROCEDURE — 96374 THER/PROPH/DIAG INJ IV PUSH: CPT

## 2021-05-06 PROCEDURE — 96375 TX/PRO/DX INJ NEW DRUG ADDON: CPT

## 2021-05-06 PROCEDURE — 99214 OFFICE O/P EST MOD 30 MIN: CPT | Performed by: OBSTETRICS & GYNECOLOGY

## 2021-05-06 PROCEDURE — 99214 OFFICE O/P EST MOD 30 MIN: CPT

## 2021-05-06 PROCEDURE — 96360 HYDRATION IV INFUSION INIT: CPT

## 2021-05-06 RX ORDER — ONDANSETRON 4 MG/1
4 TABLET, ORALLY DISINTEGRATING ORAL EVERY 6 HOURS PRN
Qty: 20 TABLET | Refills: 0 | Status: SHIPPED | OUTPATIENT
Start: 2021-05-06 | End: 2021-05-06 | Stop reason: SDUPTHER

## 2021-05-06 RX ORDER — ONDANSETRON 2 MG/ML
4 INJECTION INTRAMUSCULAR; INTRAVENOUS ONCE
Status: COMPLETED | OUTPATIENT
Start: 2021-05-06 | End: 2021-05-06

## 2021-05-06 RX ORDER — ONDANSETRON 4 MG/1
4 TABLET, ORALLY DISINTEGRATING ORAL EVERY 6 HOURS PRN
Qty: 20 TABLET | Refills: 0 | Status: SHIPPED | OUTPATIENT
Start: 2021-05-06 | End: 2021-05-25 | Stop reason: HOSPADM

## 2021-05-06 RX ORDER — SODIUM CHLORIDE, SODIUM LACTATE, POTASSIUM CHLORIDE, CALCIUM CHLORIDE 600; 310; 30; 20 MG/100ML; MG/100ML; MG/100ML; MG/100ML
125 INJECTION, SOLUTION INTRAVENOUS CONTINUOUS
Status: DISCONTINUED | OUTPATIENT
Start: 2021-05-06 | End: 2021-05-06 | Stop reason: HOSPADM

## 2021-05-06 RX ADMIN — ONDANSETRON 4 MG: 2 INJECTION INTRAMUSCULAR; INTRAVENOUS at 18:39

## 2021-05-06 RX ADMIN — SODIUM CHLORIDE, SODIUM LACTATE, POTASSIUM CHLORIDE, AND CALCIUM CHLORIDE 125 ML/HR: .6; .31; .03; .02 INJECTION, SOLUTION INTRAVENOUS at 18:53

## 2021-05-06 RX ADMIN — SODIUM CHLORIDE, SODIUM LACTATE, POTASSIUM CHLORIDE, AND CALCIUM CHLORIDE 1000 ML: .6; .31; .03; .02 INJECTION, SOLUTION INTRAVENOUS at 18:04

## 2021-05-06 RX ADMIN — MORPHINE SULFATE 2 MG: 2 INJECTION, SOLUTION INTRAMUSCULAR; INTRAVENOUS at 18:52

## 2021-05-06 NOTE — PROGRESS NOTES
L&D Triage Note - OB/GYN  Uziel Wayne Negrete 38 y o  female MRN: [id number]  Unit/Bed#: Mariana Cabello Encounter: [id number]    Patient is seen by [facility]    [address] [facility] Nik is a 38 y o  Davonte Jdaa at 36w1d who presents for r/o rupture and r/o labor, as well as nausea/vomiting    PLAN  #1  Rule out rupture/rule out labor  · Vagina and cervix without erythema or lesions, scant white physiologic discharge in the posterior fornix, cervical os visually closed, no pooling, cough test negative  · Microscopy unremarkable, nitrazine negative  · SVE: C/T/H  #2  Nausea vomiting/diarrhea  · IVF in triage, IV zofran with good effect  · Prescription for Zofran ODT provided  · Patient tolerating PO  · Attempted to collect a UA , however patient was unable to void    #3  Discharge instructions  · Patient instructed to call if experiencing worsening contractions, vaginal bleeding, loss of fluid or decreased fetal movement  · Will follow up with OBGYN  D/w Dr Graciela Loyola  ______________    SUBJECTIVE    EH: Estimated Date of Delivery: 21    HPI:  38 y o   36w1d presents with nausea and vomiting as well as new onset contractions today since she woke up this morning  Patient reports that she has been vomiting bile throughout the day today, and she has been feeling contractions about every 5-10 minutes  She has need to keep down some fluids, however no food  On her way to triage, patient vomited and noticed that she had soaked through her underwear and pants  No obvious leaking since then      Contractions:  Endorses  Leakage of fluid:  Gush of fluid in the car  Vaginal Bleeding:  Denies  Fetal movement: present    Her obstetrical history is significant for AMA, obesity, previous  for failure to progress in 2nd stage, history of preeclampsia    ROS:  Constitutional: Negative for fevers, chills, headaches, vision changes  Respiratory: Negative for shortness of breath, cough  Cardiovascular: Negative for chest pain, lower extremity edema    Gastrointestinal:  Positive for diarrhea while in triage; Negative for blood in stool, constipation  :  Negative for dysuria, hematuria  EXTR:  Negative for rash, new myalgias/arthralgias      OBJECTIVE:  Temp 99 1 °F (37 3 °C) (Oral)   Resp 18   Ht 5' 7" (1 702 m)   Wt 115 kg (254 lb)   LMP 08/26/2020   BMI 39 78 kg/m²   Body mass index is 39 78 kg/m²  Physical Exam  Constitutional:       General: She is not in acute distress  Appearance: Normal appearance  Cardiovascular:      Rate and Rhythm: Normal rate and regular rhythm  Heart sounds: Normal heart sounds  No murmur  No friction rub  No gallop  Pulmonary:      Effort: Pulmonary effort is normal  No respiratory distress  Breath sounds: Normal breath sounds  No stridor  No wheezing, rhonchi or rales  Abdominal:      General: There is no distension  Palpations: Abdomen is soft  Tenderness: There is no abdominal tenderness  There is no guarding or rebound  Comments: gravid   Musculoskeletal:         General: No swelling or tenderness  Skin:     Coloration: Skin is not pale  Findings: No rash  Neurological:      General: No focal deficit present  Mental Status: She is alert           Speculum exam:  Slightly swollen purple/blue vulva and external genitalia, vagina and cervix without erythema or lesions, scant white physiologic discharge in the posterior fornix, cervical os visually closed, cough test negative    KOH/Wet [place]:     Infection:   - no clue cells    - no hyphae   - no trichomonads present    Membrane status   - no ferning   - negative nitrazene   - no pooling     SVE:   C/T/H and posterior    FHT:  Baseline Rate: 130 bpm  Variability: Moderate 6-25 bpm  Accelerations: 15 x 15 or greater, At variable times  FHR Category: Category I    TOCO:   Contraction Frequency (minutes): 2-4  Contraction Duration (seconds): 40-80  Contraction Quality: Mild    Labs:   Recent Results (from the past 24 hour(s))   Lavender Top on hold    Collection Time: 05/06/21  5:57 PM   Result Value Ref Range    Extra Tube Hold for add-ons            El Mccarty MD  OB/GYN PGY-1  5/6/2021  7:26 PM

## 2021-05-06 NOTE — TELEPHONE ENCOUNTER
Pt called stating that she is vomiting bile and she thinks she is having denise dorantes  She isn't really eating because of vomiting but she was going to try to eat some crackers  Please advise

## 2021-05-06 NOTE — LETTER
[address] AND DELIVERY  [address] [place] [address]  Dept: [phone]    May 6, 2021     Patient: Alo Clay   Date of Visit: 2/6/2021       To Whom it May Concern:    Justa Quiroga is under my professional care  She was seen in the hospital on 05/06/21  She may return to work on 5/7/21  If you have any questions or concerns, please don't hesitate to call           Sincerely,          Jeferson Altamirano MD

## 2021-05-07 NOTE — DISCHARGE INSTRUCTIONS
Pregnancy at [address] to [address]:   You are considered full term at the beginning of 37 weeks  Your breathing may be easier if your baby has moved down into a head-down position  You may need to urinate more often because the baby may be pressing on your bladder  You may also feel more discomfort and get tired easily  DISCHARGE INSTRUCTIONS:   Return to the emergency department if:   · You develop a severe headache that does not go away  · You have new or increased vision changes, such as blurred or spotted vision  · You have new or increased swelling in your face or hands  · You have vaginal spotting or bleeding  · Your water broke or you feel warm water gushing or trickling from your vagina  Contact your healthcare provider if:   · You have more than 5 contractions in 1 hour  · You notice any changes in your baby's movements  · You have abdominal cramps, pressure, or tightening  · You have a change in vaginal discharge  · You have chills or a fever  · You have vaginal itching, burning, or pain  · You have yellow, green, white, or foul-smelling vaginal discharge  · You have pain or burning when you urinate, less urine than usual, or pink or bloody urine  · You have questions or concerns about your condition or care  How to care for yourself at this stage of your pregnancy:   · Eat a variety of healthy foods  Healthy foods include fruits, vegetables, whole-grain breads, low-fat dairy foods, beans, lean meats, and fish  Drink liquids as directed  Ask how much liquid to drink each day and which liquids are best for you  Limit caffeine to less than 200 milligrams each day  Limit your intake of fish to 2 servings each week  Choose fish low in mercury such as canned light tuna, shrimp, salmon, cod, or tilapia  Do not  eat fish high in mercury such as swordfish, tilefish, tico mackerel, and shark  · Take prenatal vitamins as directed    Your need for certain vitamins and minerals, such as folic acid, increases during pregnancy  Prenatal vitamins provide some of the extra vitamins and minerals you need  Prenatal vitamins may also help to decrease the risk of certain birth defects  · Rest as needed  Put your feet up if you have swelling in your ankles and feet  · Do not smoke  Smoking increases your risk of a miscarriage and other health problems during your pregnancy  Smoking can cause your baby to be born early or weigh less at birth  Ask your healthcare provider for information if you need help quitting  · Do not drink alcohol  Alcohol passes from your body to your baby through the placenta  It can affect your baby's brain development and cause fetal alcohol syndrome (FAS)  FAS is a group of conditions that causes mental, behavior, and growth problems  · Talk to your healthcare provider before you take any medicines  Many medicines may harm your baby if you take them when you are pregnant  Do not take any medicines, vitamins, herbs, or supplements without first talking to your healthcare provider  Never use illegal or street drugs (such as marijuana or cocaine) while you are pregnant  · Talk to your healthcare provider before you travel  You may not be able to travel in an airplane after 36 weeks  He may also recommend that you avoid long road trips  Safety tips:   · Avoid hot tubs and saunas  Do not use a hot tub or sauna while you are pregnant, especially during your first trimester  Hot tubs and saunas may raise your baby's temperature and increase the risk of birth defects  · Avoid toxoplasmosis  This is an infection caused by eating raw meat or being around infected cat feces  It can cause birth defects, miscarriages, and other problems  Wash your hands after you touch raw meat  Make sure any meat is well-cooked before you eat it  Avoid raw eggs and unpasteurized milk   Use gloves or ask someone else to clean your cat's litter box while you are pregnant  · Ask your healthcare provider about travel  The most comfortable time to travel is during the second trimester  Ask your healthcare provider if you can travel after 36 weeks  You may not be able to travel in an airplane after 36 weeks  He may also recommend that you avoid long road trips  Changes that are happening with your baby:  By 38 weeks, your baby may weigh between 6 and 9 pounds  Your baby may be about 14 inches long from the top of the head to the rump (baby's bottom)  Your baby hears well enough to know your voice  As your baby gets larger, you may feel fewer kicks and more stretching and rolling  Your baby may move into a head-down position  Your baby will also rest lower in your abdomen  What you need to know about prenatal care: Your healthcare provider will check your blood pressure and weight  You may also need the following:  · A urine test  may also be done to check for sugar and protein  These can be signs of gestational diabetes or infection  Protein in your urine may also be a sign of preeclampsia  Preeclampsia is a condition that can develop during week 20 or later of your pregnancy  It causes high blood pressure, and it can cause problems with your kidneys and other organs  · A blood test  may be done to check for anemia (low iron level)  · A Tdap vaccine  may be recommended by your healthcare provider  · A group B strep test  is a test that is done to check for group B strep infection  Group B strep is a type of bacteria that may be found in the vagina or rectum  It can be passed to your baby during delivery if you have it  Your healthcare provider will take swab your vagina or rectum and send the sample to the lab for tests  · Fundal height  is a measurement of your uterus to check your baby's growth  This number is usually the same as the number of weeks that you have been pregnant   Your healthcare provider may also check your baby's position  · Your baby's heart rate  will be checked  © Copyright [address] Information is for End User's use only and may not be sold, redistributed or otherwise used for commercial purposes  All illustrations and images included in CareNotes® are the copyrighted property of A D A M , Inc  or [facility] Magdaleno Maya   The above information is an  only  It is not intended as medical advice for individual conditions or treatments  Talk to your doctor, nurse or pharmacist before following any medical regimen to see if it is safe and effective for you

## 2021-05-07 NOTE — PROGRESS NOTES
Pt here for NSt which was reactive today  She still continues with random HAs and dizziness--none at this time  +swelling of LE  BP occ elevated, but normal today  GBS done  Recd tdap  Recd pump  rx for fioricet prn  Hydrate  rest

## 2021-05-08 LAB — GP B STREP DNA SPEC QL NAA+PROBE: NEGATIVE

## 2021-05-11 ENCOUNTER — HOSPITAL ENCOUNTER (OUTPATIENT)
Facility: HOSPITAL | Age: 36
Discharge: HOME | End: 2021-05-11
Attending: OBSTETRICS & GYNECOLOGY | Admitting: OBSTETRICS & GYNECOLOGY
Payer: COMMERCIAL

## 2021-05-11 ENCOUNTER — TELEPHONE (OUTPATIENT)
Dept: OBGYN CLINIC | Facility: CLINIC | Age: 36
End: 2021-05-11

## 2021-05-11 VITALS
RESPIRATION RATE: 16 BRPM | HEIGHT: 67 IN | HEART RATE: 76 BPM | DIASTOLIC BLOOD PRESSURE: 66 MMHG | TEMPERATURE: 98.7 F | SYSTOLIC BLOOD PRESSURE: 126 MMHG | WEIGHT: 254 LBS | BODY MASS INDEX: 39.87 KG/M2

## 2021-05-11 PROBLEM — Z87.59 HISTORY OF PRE-ECLAMPSIA: Status: ACTIVE | Noted: 2021-05-11

## 2021-05-11 LAB
ALBUMIN SERPL BCP-MCNC: 2.3 G/DL (ref 3.5–5)
ALP SERPL-CCNC: 135 U/L (ref 46–116)
ALT SERPL W P-5'-P-CCNC: 27 U/L (ref 12–78)
ANION GAP SERPL CALCULATED.3IONS-SCNC: 9 MMOL/L (ref 4–13)
AST SERPL W P-5'-P-CCNC: 25 U/L (ref 5–45)
BACTERIA UR QL AUTO: ABNORMAL /HPF
BASOPHILS # BLD AUTO: 0.02 THOUSANDS/ΜL (ref 0–0.1)
BASOPHILS NFR BLD AUTO: 0 % (ref 0–1)
BILIRUB SERPL-MCNC: 0.23 MG/DL (ref 0.2–1)
BILIRUB UR QL STRIP: NEGATIVE
BUN SERPL-MCNC: 5 MG/DL (ref 5–25)
CALCIUM ALBUM COR SERPL-MCNC: 9.5 MG/DL (ref 8.3–10.1)
CALCIUM SERPL-MCNC: 8.1 MG/DL (ref 8.3–10.1)
CAOX CRY URNS QL MICRO: ABNORMAL /HPF
CHLORIDE SERPL-SCNC: 106 MMOL/L (ref 100–108)
CLARITY UR: ABNORMAL
CO2 SERPL-SCNC: 24 MMOL/L (ref 21–32)
COLOR UR: YELLOW
CREAT SERPL-MCNC: 0.79 MG/DL (ref 0.6–1.3)
CREAT UR-MCNC: 249 MG/DL
EOSINOPHIL # BLD AUTO: 0.23 THOUSAND/ΜL (ref 0–0.61)
EOSINOPHIL NFR BLD AUTO: 2 % (ref 0–6)
ERYTHROCYTE [DISTWIDTH] IN BLOOD BY AUTOMATED COUNT: 13.4 % (ref 11.6–15.1)
GFR SERPL CREATININE-BSD FRML MDRD: 96 ML/MIN/1.73SQ M
GLUCOSE SERPL-MCNC: 96 MG/DL (ref 65–140)
GLUCOSE UR STRIP-MCNC: NEGATIVE MG/DL
HCT VFR BLD AUTO: 32 % (ref 34.8–46.1)
HGB BLD-MCNC: 10.1 G/DL (ref 11.5–15.4)
HGB UR QL STRIP.AUTO: NEGATIVE
IMM GRANULOCYTES # BLD AUTO: 0.06 THOUSAND/UL (ref 0–0.2)
IMM GRANULOCYTES NFR BLD AUTO: 1 % (ref 0–2)
KETONES UR STRIP-MCNC: NEGATIVE MG/DL
LEUKOCYTE ESTERASE UR QL STRIP: NEGATIVE
LYMPHOCYTES # BLD AUTO: 2.48 THOUSANDS/ΜL (ref 0.6–4.47)
LYMPHOCYTES NFR BLD AUTO: 23 % (ref 14–44)
MCH RBC QN AUTO: 25.3 PG (ref 26.8–34.3)
MCHC RBC AUTO-ENTMCNC: 31.6 G/DL (ref 31.4–37.4)
MCV RBC AUTO: 80 FL (ref 82–98)
MONOCYTES # BLD AUTO: 0.76 THOUSAND/ΜL (ref 0.17–1.22)
MONOCYTES NFR BLD AUTO: 7 % (ref 4–12)
MUCOUS THREADS UR QL AUTO: ABNORMAL
NEUTROPHILS # BLD AUTO: 7.24 THOUSANDS/ΜL (ref 1.85–7.62)
NEUTS SEG NFR BLD AUTO: 67 % (ref 43–75)
NITRITE UR QL STRIP: NEGATIVE
NON-SQ EPI CELLS URNS QL MICRO: ABNORMAL /HPF
NRBC BLD AUTO-RTO: 0 /100 WBCS
PH UR STRIP.AUTO: 6 [PH]
PLATELET # BLD AUTO: 228 THOUSANDS/UL (ref 149–390)
PMV BLD AUTO: 11.7 FL (ref 8.9–12.7)
POTASSIUM SERPL-SCNC: 3.4 MMOL/L (ref 3.5–5.3)
PROT SERPL-MCNC: 6.2 G/DL (ref 6.4–8.2)
PROT UR STRIP-MCNC: ABNORMAL MG/DL
PROT UR-MCNC: 43 MG/DL
PROT/CREAT UR: 0.17 MG/G{CREAT} (ref 0–0.1)
RBC # BLD AUTO: 4 MILLION/UL (ref 3.81–5.12)
RBC #/AREA URNS AUTO: ABNORMAL /HPF
SODIUM SERPL-SCNC: 139 MMOL/L (ref 136–145)
SP GR UR STRIP.AUTO: >=1.03 (ref 1–1.03)
UROBILINOGEN UR QL STRIP.AUTO: 0.2 E.U./DL
WBC # BLD AUTO: 10.79 THOUSAND/UL (ref 4.31–10.16)
WBC #/AREA URNS AUTO: ABNORMAL /HPF

## 2021-05-11 PROCEDURE — 85025 COMPLETE CBC W/AUTO DIFF WBC: CPT | Performed by: FAMILY MEDICINE

## 2021-05-11 PROCEDURE — 84156 ASSAY OF PROTEIN URINE: CPT | Performed by: FAMILY MEDICINE

## 2021-05-11 PROCEDURE — 80053 COMPREHEN METABOLIC PANEL: CPT | Performed by: FAMILY MEDICINE

## 2021-05-11 PROCEDURE — 82570 ASSAY OF URINE CREATININE: CPT | Performed by: FAMILY MEDICINE

## 2021-05-11 PROCEDURE — 81001 URINALYSIS AUTO W/SCOPE: CPT | Performed by: FAMILY MEDICINE

## 2021-05-11 PROCEDURE — NC001 PR NO CHARGE: Performed by: OBSTETRICS & GYNECOLOGY

## 2021-05-11 PROCEDURE — 99214 OFFICE O/P EST MOD 30 MIN: CPT

## 2021-05-11 RX ORDER — SODIUM CHLORIDE, SODIUM GLUCONATE, SODIUM ACETATE, POTASSIUM CHLORIDE, MAGNESIUM CHLORIDE, SODIUM PHOSPHATE, DIBASIC, AND POTASSIUM PHOSPHATE .53; .5; .37; .037; .03; .012; .00082 G/100ML; G/100ML; G/100ML; G/100ML; G/100ML; G/100ML; G/100ML
500 INJECTION, SOLUTION INTRAVENOUS ONCE
Status: DISCONTINUED | OUTPATIENT
Start: 2021-05-11 | End: 2021-05-11

## 2021-05-11 RX ORDER — POTASSIUM CHLORIDE 20 MEQ/1
40 TABLET, EXTENDED RELEASE ORAL ONCE
Status: COMPLETED | OUTPATIENT
Start: 2021-05-11 | End: 2021-05-11

## 2021-05-11 RX ADMIN — POTASSIUM CHLORIDE 40 MEQ: 1500 TABLET, EXTENDED RELEASE ORAL at 17:23

## 2021-05-11 NOTE — LETTER
[address] AND DELIVERY  [address] [place], [place] [address]  Dept: [phone]    May 11, 2021     Patient: Brendan Robins   YOB: 1985   Date of Visit: 5/6/2021       To Whom it May Concern:    Jose Dyer is under my professional care  She was seen in the hospital from 5/6/2021   to 05/11/21  She may return to work on 05/12/21 without limitations  If you have any questions or concerns, please don't hesitate to call           Sincerely,          Elijah Murray, DO

## 2021-05-11 NOTE — PROGRESS NOTES
Triage Note - OB  Patt Negrete 38 y o  female MRN: [id number]  Unit/Bed#: LD TRIAGE 1- Encounter: [id number]    OB TRIAGE NOTE  Nivia Canseco  [id number]  2021  4:49 PM  LD TRIAGE 1/LD TRIAGE 1-*    ASSESS:  38 y o   36w6d with Bps ranging from 150-160/90 at her work  Currently VS stable, /80  Prior Hx of pre-eclampsia in last pregnancy  No current complaints  PLAN  · Pre-E r/o labs:  · CMP, CBC, UA, P/C ratio (all normal)  · PO intake with water  · Follow-up Thursday at next appointment with BP logs  · Patient instructed to call if experiencing worsening contractions, vaginal bleeding, loss of fluid or decreased fetal movement  · Will follow up with OBGYN as scheduled    D/w Dr Joseph Beverage  ______________    SUBJECTIVE    EH: Estimated Date of Delivery: 21    HPI Chronology:  38 y o   36w6d presents with complaint of increased BP reading at work  She complains of intermittent headaches, none recently  She denies visual changes besides some floaters  She is denying RUQ pain, chest pains, shortness of breath or abdominal pains  Contractions: no  Leakage: no  Bleeding: no  Fetal Movement: yes    Vitals:   /73   Pulse 74   Temp 98 7 °F (37 1 °C) (Oral)   Resp 16   Ht 5' 7" (1 702 m)   Wt 115 kg (254 lb)   LMP 2020   BMI 39 78 kg/m²   Body mass index is 39 78 kg/m²  Review of Systems   Constitutional: Negative for chills and fever  HENT: Negative for ear pain and sore throat  Eyes: Negative for pain and visual disturbance  Respiratory: Negative for cough and shortness of breath  Cardiovascular: Negative for chest pain and palpitations  Gastrointestinal: Negative for abdominal pain and vomiting  Genitourinary: Negative for dysuria and hematuria  Musculoskeletal: Negative for arthralgias and back pain  Skin: Negative for color change and rash  Neurological: Negative for seizures and syncope     All other systems reviewed and are negative  Physical Exam  Vitals signs reviewed  Constitutional:       General: She is not in acute distress  Appearance: Normal appearance  She is obese  She is not ill-appearing or diaphoretic  HENT:      Head: Normocephalic and atraumatic  Nose: Nose normal  No congestion or rhinorrhea  Mouth/Throat:      Mouth: Mucous membranes are moist       Pharynx: Oropharynx is clear  No oropharyngeal exudate  Eyes:      General: No scleral icterus  Conjunctiva/sclera: Conjunctivae normal       Pupils: Pupils are equal, round, and reactive to light  Neck:      Musculoskeletal: Normal range of motion  Cardiovascular:      Rate and Rhythm: Normal rate and regular rhythm  Pulses: Normal pulses  Heart sounds: No murmur  Pulmonary:      Effort: Pulmonary effort is normal       Breath sounds: Normal breath sounds  No wheezing  Chest:      Chest wall: No tenderness  Abdominal:      General: Bowel sounds are normal       Palpations: There is no mass  Tenderness: There is no abdominal tenderness  There is no right CVA tenderness or left CVA tenderness  Comments: Gravid     Musculoskeletal: Normal range of motion  Skin:     General: Skin is warm  Capillary Refill: Capillary refill takes less than 2 seconds  Findings: No bruising or rash  Neurological:      Mental Status: She is alert and oriented to person, place, and time     Psychiatric:         Mood and Affect: Mood normal          Behavior: Behavior normal            Cervix:   not checked    FHT:  Baseline Rate: 115 bpm  Variability: Moderate 6-25 bpm  Accelerations: 15 x 15 or greater, At variable times  Decelerations: None  TOCO:   Contraction Frequency (minutes): rare    Labs:   Recent Results (from the past 24 hour(s))   Urinalysis with microscopic    Collection Time: 05/11/21  3:52 PM   Result Value Ref Range    Clarity, UA Slightly Cloudy     Color, UA Yellow     Specific Gravity, UA >=1 030 1 003 - 1 030    pH, UA 6 0 4 5, 5 0, 5 5, 6 0, 6 5, 7 0, 7 5, 8 0    Glucose, UA Negative Negative mg/dl    Ketones, UA Negative Negative mg/dl    Blood, UA Negative Negative    Protein, UA Trace (A) Negative mg/dl    Nitrite, UA Negative Negative    Bilirubin, UA Negative Negative    Urobilinogen, UA 0 2 0 2, 1 0 E U /dl E U /dl    Leukocytes, UA Negative Negative    WBC, UA 2-4 None Seen, 2-4 /hpf    RBC, UA 2-4 None Seen, 2-4 /hpf    Bacteria, UA Moderate (A) None Seen, Occasional /hpf    Ca Oxalate Monet, UA Occasional (A) None Seen /hpf    Epithelial Cells Occasional None Seen, Occasional /hpf    MUCUS THREADS Innumerable (A) None Seen   Comprehensive metabolic panel    Collection Time: 05/11/21  3:52 PM   Result Value Ref Range    Sodium 139 136 - 145 mmol/L    Potassium 3 4 (L) 3 5 - 5 3 mmol/L    Chloride 106 100 - 108 mmol/L    CO2 24 21 - 32 mmol/L    ANION GAP 9 4 - 13 mmol/L    BUN 5 5 - 25 mg/dL    Creatinine 0 79 0 60 - 1 30 mg/dL    Glucose 96 65 - 140 mg/dL    Calcium 8 1 (L) 8 3 - 10 1 mg/dL    Corrected Calcium 9 5 8 3 - 10 1 mg/dL    AST 25 5 - 45 U/L    ALT 27 12 - 78 U/L    Alkaline Phosphatase 135 (H) 46 - 116 U/L    Total Protein 6 2 (L) 6 4 - 8 2 g/dL    Albumin 2 3 (L) 3 5 - 5 0 g/dL    Total Bilirubin 0 23 0 20 - 1 00 mg/dL    eGFR 96 ml/min/1 73sq m   CBC and differential    Collection Time: 05/11/21  3:52 PM   Result Value Ref Range    WBC 10 79 (H) 4 31 - 10 16 Thousand/uL    RBC 4 00 3 81 - 5 12 Million/uL    Hemoglobin 10 1 (L) 11 5 - 15 4 g/dL    Hematocrit 32 0 (L) 34 8 - 46 1 %    MCV 80 (L) 82 - 98 fL    MCH 25 3 (L) 26 8 - 34 3 pg    MCHC 31 6 31 4 - 37 4 g/dL    RDW 13 4 11 6 - 15 1 %    MPV 11 7 8 9 - 12 7 fL    Platelets 279 032 - 469 Thousands/uL    nRBC 0 /100 WBCs    Neutrophils Relative 67 43 - 75 %    Immat GRANS % 1 0 - 2 %    Lymphocytes Relative 23 14 - 44 %    Monocytes Relative 7 4 - 12 %    Eosinophils Relative 2 0 - 6 %    Basophils Relative 0 0 - 1 %    Neutrophils Absolute 7 24 1 85 - 7 62 Thousands/µL    Immature Grans Absolute 0 06 0 00 - 0 20 Thousand/uL    Lymphocytes Absolute 2 48 0 60 - 4 47 Thousands/µL    Monocytes Absolute 0 76 0 17 - 1 22 Thousand/µL    Eosinophils Absolute 0 23 0 00 - 0 61 Thousand/µL    Basophils Absolute 0 02 0 00 - 0 10 Thousands/µL   Protein / creatinine ratio, urine    Collection Time: 05/11/21  3:52 PM   Result Value Ref Range    Creatinine, Ur 249 0 mg/dL    Protein Urine Random 43 mg/dL    Prot/Creat Ratio, Ur 0 17 (H) 0 00 - 0 10       Lab, Imaging and other studies: I have personally reviewed pertinent reports          North Gallegos DO  5/11/2021  4:49 PM

## 2021-05-11 NOTE — TELEPHONE ENCOUNTER
The patient called because she had three high blood pressure for the past half hour  She is also having swelling in her hands and feet and she has floaters in her eyes  The patient also has had the elevated blood pressure, swelling and floaters on and off for the past two weeks  She currently has no nausea, vomiting, headache or dizziness  After speaking with Annette Allison, we sent the patient over to L&D to be evaluated

## 2021-05-13 ENCOUNTER — ROUTINE PRENATAL (OUTPATIENT)
Dept: OBGYN CLINIC | Facility: CLINIC | Age: 36
End: 2021-05-13

## 2021-05-13 VITALS — DIASTOLIC BLOOD PRESSURE: 90 MMHG | WEIGHT: 259.4 LBS | BODY MASS INDEX: 40.63 KG/M2 | SYSTOLIC BLOOD PRESSURE: 130 MMHG

## 2021-05-13 DIAGNOSIS — Z34.83 PRENATAL CARE, SUBSEQUENT PREGNANCY IN THIRD TRIMESTER: Primary | ICD-10-CM

## 2021-05-13 DIAGNOSIS — Z3A.33 33 WEEKS GESTATION OF PREGNANCY: ICD-10-CM

## 2021-05-13 DIAGNOSIS — O16.3 ELEVATED BLOOD PRESSURE AFFECTING PREGNANCY IN THIRD TRIMESTER, ANTEPARTUM: ICD-10-CM

## 2021-05-13 PROCEDURE — PNV: Performed by: PHYSICIAN ASSISTANT

## 2021-05-13 NOTE — PROGRESS NOTES
Patient has had no bleeding, leaking, or cramping  Patient does have a lot of swelling in her feet, ankles and legs  Patient states no blurry vision and she did have headaches last night but took Fioricet      Patient has been tracking her blood pressure, getting them taken at work:  5/12 @ 2:18pm  Left arm: 142/82  Right Arm 148/88    3:30pm  Left arm 144/82  Right arm 148/30    5/13 @ 9:44am  Left arm 166/106  Right Arm 158/76    11:46am  Left arm 170/110  Right Arm 162/90    Left arm 178/106  174/94

## 2021-05-14 DIAGNOSIS — O16.3 ELEVATED BLOOD PRESSURE AFFECTING PREGNANCY IN THIRD TRIMESTER, ANTEPARTUM: Primary | ICD-10-CM

## 2021-05-14 DIAGNOSIS — Z34.83 PRENATAL CARE, SUBSEQUENT PREGNANCY IN THIRD TRIMESTER: Primary | ICD-10-CM

## 2021-05-14 DIAGNOSIS — I10 HYPERTENSION, UNSPECIFIED TYPE: ICD-10-CM

## 2021-05-14 RX ORDER — LABETALOL 100 MG/1
100 TABLET, FILM COATED ORAL 2 TIMES DAILY
Qty: 30 TABLET | Refills: 1 | Status: SHIPPED | OUTPATIENT
Start: 2021-05-14 | End: 2021-09-14 | Stop reason: ALTCHOICE

## 2021-05-14 RX ORDER — LABETALOL 100 MG/1
100 TABLET, FILM COATED ORAL
Status: CANCELLED | OUTPATIENT
Start: 2021-05-14

## 2021-05-14 RX ORDER — LABETALOL 200 MG/1
100 TABLET, FILM COATED ORAL 2 TIMES DAILY
Qty: 60 TABLET | Refills: 0 | Status: SHIPPED | OUTPATIENT
Start: 2021-05-14 | End: 2021-05-25 | Stop reason: HOSPADM

## 2021-05-14 NOTE — TELEPHONE ENCOUNTER
Patient says that yesterday you said you would give her labetalol for her high blood pressure, but there is no order for it in our records  Patient said she went to pick it up at the pharmacy and it was not there

## 2021-05-14 NOTE — PROGRESS NOTES
Pt presents for NST today  She has not been feeling well  Random HAs  "floaters"  B/l le swelling  Elevated BPS  Went to LR yesterday for w/u---all came back normal  Pt scheduled for c/s 5/27  bp 130s+/80s+  Yesterday had 180/102  Will cont to keep log  Will start pt on rx labetalol 100mg bid  And take pt out of work  Had f/u here early next week  Pt would really like an EFW despite being a scheduled c/s      Knows to call with any BP elevations before next appt

## 2021-05-19 ENCOUNTER — ROUTINE PRENATAL (OUTPATIENT)
Dept: OBGYN CLINIC | Facility: CLINIC | Age: 36
End: 2021-05-19
Payer: COMMERCIAL

## 2021-05-19 VITALS — DIASTOLIC BLOOD PRESSURE: 80 MMHG | SYSTOLIC BLOOD PRESSURE: 120 MMHG | WEIGHT: 253 LBS | BODY MASS INDEX: 39.63 KG/M2

## 2021-05-19 DIAGNOSIS — Z3A.38 38 WEEKS GESTATION OF PREGNANCY: Primary | ICD-10-CM

## 2021-05-19 PROCEDURE — 76815 OB US LIMITED FETUS(S): CPT | Performed by: OBSTETRICS & GYNECOLOGY

## 2021-05-19 PROCEDURE — PNV: Performed by: OBSTETRICS & GYNECOLOGY

## 2021-05-19 NOTE — PROGRESS NOTES
EFW  The patient has swelling in her feet and tingling if she stands for too long  The patient has a lot of heartburn  The patient has trouble sleeping at night because of the heartburn and sitting up in bed does not help  The patient has burning in her throat  No dizziness, or vomiting  The patient has pressure/ pain in her pelvis when she stands up

## 2021-05-19 NOTE — PROGRESS NOTES
No bleeding no cramping  Patient was seen in labor room in labs were normal   Patient was seen last week in the office and placed on labetalol 200 mg b i d  [place] Elder Blood pressures have been ranging 1 tender 120 is over 70s  Patient was complaining of ankle swelling however on exam today her ankles were not swollen  AC: 350  FL: 68  FAVIO: 26 cm  EFW: 3297g 7lbs 1 oz       Plan:  Stop labetalol and return to the office tomorrow for an NST

## 2021-05-20 ENCOUNTER — ROUTINE PRENATAL (OUTPATIENT)
Dept: OBGYN CLINIC | Facility: CLINIC | Age: 36
End: 2021-05-20
Payer: COMMERCIAL

## 2021-05-20 VITALS — WEIGHT: 253 LBS | SYSTOLIC BLOOD PRESSURE: 130 MMHG | DIASTOLIC BLOOD PRESSURE: 80 MMHG | BODY MASS INDEX: 39.63 KG/M2

## 2021-05-20 DIAGNOSIS — Z3A.38 38 WEEKS GESTATION OF PREGNANCY: Primary | ICD-10-CM

## 2021-05-20 PROCEDURE — PNV: Performed by: OBSTETRICS & GYNECOLOGY

## 2021-05-20 PROCEDURE — 59025 FETAL NON-STRESS TEST: CPT | Performed by: OBSTETRICS & GYNECOLOGY

## 2021-05-20 NOTE — PROGRESS NOTES
NST reactive no contractions  Patient denies any bleeding leaking or contractions  Positive fetal movement  Patient scheduled for repeat  next Thursday at 8:00 a m     Information on abdominal prep and Hibiclens bottle given today

## 2021-05-20 NOTE — PROGRESS NOTES
NST  The patient has swelling in her hands and feet  The patient has a mild headache today  No dizziness  No nausea or vomiting  The patient has a lot of heartburn  No bleeding or cramping  The patient has a lot of pelvic pressure

## 2021-05-23 ENCOUNTER — ANESTHESIA (INPATIENT)
Dept: LABOR AND DELIVERY | Facility: HOSPITAL | Age: 36
End: 2021-05-23
Payer: COMMERCIAL

## 2021-05-23 ENCOUNTER — ANESTHESIA EVENT (INPATIENT)
Dept: LABOR AND DELIVERY | Facility: HOSPITAL | Age: 36
End: 2021-05-23
Payer: COMMERCIAL

## 2021-05-23 ENCOUNTER — HOSPITAL ENCOUNTER (INPATIENT)
Facility: HOSPITAL | Age: 36
LOS: 2 days | Discharge: HOME | End: 2021-05-25
Attending: OBSTETRICS & GYNECOLOGY | Admitting: OBSTETRICS & GYNECOLOGY
Payer: COMMERCIAL

## 2021-05-23 DIAGNOSIS — O34.219 PREVIOUS CESAREAN SECTION COMPLICATING PREGNANCY: ICD-10-CM

## 2021-05-23 PROBLEM — O13.9 GESTATIONAL HYPERTENSION: Status: ACTIVE | Noted: 2021-05-23

## 2021-05-23 PROBLEM — Z3A.38 38 WEEKS GESTATION OF PREGNANCY: Status: ACTIVE | Noted: 2021-03-31

## 2021-05-23 PROBLEM — Z98.891 S/P REPEAT LOW TRANSVERSE C-SECTION: Status: ACTIVE | Noted: 2021-05-23

## 2021-05-23 PROBLEM — Z90.79 STATUS POST BILATERAL SALPINGECTOMY: Status: ACTIVE | Noted: 2021-05-23

## 2021-05-23 LAB
ABO GROUP BLD: NORMAL
ALBUMIN SERPL BCP-MCNC: 3 G/DL (ref 3.5–5)
ALP SERPL-CCNC: 173 U/L (ref 46–116)
ALT SERPL W P-5'-P-CCNC: 23 U/L (ref 12–78)
ANION GAP SERPL CALCULATED.3IONS-SCNC: 9 MMOL/L (ref 4–13)
AST SERPL W P-5'-P-CCNC: 23 U/L (ref 5–45)
BASE EXCESS BLDCOA CALC-SCNC: -1.6 MMOL/L (ref 3–11)
BASE EXCESS BLDCOV CALC-SCNC: 0.2 MMOL/L (ref 1–9)
BILIRUB SERPL-MCNC: 0.25 MG/DL (ref 0.2–1)
BLD GP AB SCN SERPL QL: NEGATIVE
BUN SERPL-MCNC: 12 MG/DL (ref 5–25)
CALCIUM ALBUM COR SERPL-MCNC: 9.9 MG/DL (ref 8.3–10.1)
CALCIUM SERPL-MCNC: 9.1 MG/DL (ref 8.3–10.1)
CHLORIDE SERPL-SCNC: 105 MMOL/L (ref 100–108)
CO2 SERPL-SCNC: 23 MMOL/L (ref 21–32)
CREAT SERPL-MCNC: 0.92 MG/DL (ref 0.6–1.3)
CREAT UR-MCNC: 163 MG/DL
ERYTHROCYTE [DISTWIDTH] IN BLOOD BY AUTOMATED COUNT: 14.3 % (ref 11.6–15.1)
ERYTHROCYTE [DISTWIDTH] IN BLOOD BY AUTOMATED COUNT: 14.4 % (ref 11.6–15.1)
GFR SERPL CREATININE-BSD FRML MDRD: 80 ML/MIN/1.73SQ M
GLUCOSE SERPL-MCNC: 80 MG/DL (ref 65–140)
HCO3 BLDCOA-SCNC: 26.9 MMOL/L (ref 17.3–27.3)
HCO3 BLDCOV-SCNC: 26.4 MMOL/L (ref 12.2–28.6)
HCT VFR BLD AUTO: 28.9 % (ref 34.8–46.1)
HCT VFR BLD AUTO: 34.2 % (ref 34.8–46.1)
HGB BLD-MCNC: 10.8 G/DL (ref 11.5–15.4)
HGB BLD-MCNC: 9.3 G/DL (ref 11.5–15.4)
MCH RBC QN AUTO: 24.5 PG (ref 26.8–34.3)
MCH RBC QN AUTO: 25 PG (ref 26.8–34.3)
MCHC RBC AUTO-ENTMCNC: 31.6 G/DL (ref 31.4–37.4)
MCHC RBC AUTO-ENTMCNC: 32.2 G/DL (ref 31.4–37.4)
MCV RBC AUTO: 78 FL (ref 82–98)
MCV RBC AUTO: 78 FL (ref 82–98)
O2 CT VFR BLDCOA CALC: 4.4 ML/DL
OXYHGB MFR BLDCOA: 19 %
OXYHGB MFR BLDCOV: 34.5 %
PCO2 BLDCOA: 60 MM[HG] (ref 30–60)
PCO2 BLDCOV: 48.3 MM HG (ref 27–43)
PH BLDCOA: 7.27 [PH] (ref 7.23–7.43)
PH BLDCOV: 7.36 [PH] (ref 7.19–7.49)
PLATELET # BLD AUTO: 204 THOUSANDS/UL (ref 149–390)
PLATELET # BLD AUTO: 286 THOUSANDS/UL (ref 149–390)
PMV BLD AUTO: 12.1 FL (ref 8.9–12.7)
PMV BLD AUTO: 13.2 FL (ref 8.9–12.7)
PO2 BLDCOA: 12 MM HG (ref 5–25)
PO2 BLDCOV: 15.8 MM HG (ref 15–45)
POTASSIUM SERPL-SCNC: 4.6 MMOL/L (ref 3.5–5.3)
PROT SERPL-MCNC: 6.9 G/DL (ref 6.4–8.2)
PROT UR-MCNC: 23 MG/DL
PROT/CREAT UR: 0.14 MG/G{CREAT} (ref 0–0.1)
RBC # BLD AUTO: 3.72 MILLION/UL (ref 3.81–5.12)
RBC # BLD AUTO: 4.41 MILLION/UL (ref 3.81–5.12)
RH BLD: POSITIVE
SAO2 % BLDCOV: 7.7 ML/DL
SODIUM SERPL-SCNC: 137 MMOL/L (ref 136–145)
SPECIMEN EXPIRATION DATE: NORMAL
WBC # BLD AUTO: 12.66 THOUSAND/UL (ref 4.31–10.16)
WBC # BLD AUTO: 14.25 THOUSAND/UL (ref 4.31–10.16)

## 2021-05-23 PROCEDURE — 80053 COMPREHEN METABOLIC PANEL: CPT | Performed by: OBSTETRICS & GYNECOLOGY

## 2021-05-23 PROCEDURE — 84156 ASSAY OF PROTEIN URINE: CPT | Performed by: OBSTETRICS & GYNECOLOGY

## 2021-05-23 PROCEDURE — 86900 BLOOD TYPING SEROLOGIC ABO: CPT | Performed by: OBSTETRICS & GYNECOLOGY

## 2021-05-23 PROCEDURE — 85027 COMPLETE CBC AUTOMATED: CPT | Performed by: OBSTETRICS & GYNECOLOGY

## 2021-05-23 PROCEDURE — 88307 TISSUE EXAM BY PATHOLOGIST: CPT | Performed by: PATHOLOGY

## 2021-05-23 PROCEDURE — 86850 RBC ANTIBODY SCREEN: CPT | Performed by: OBSTETRICS & GYNECOLOGY

## 2021-05-23 PROCEDURE — 59510 CESAREAN DELIVERY: CPT | Performed by: OBSTETRICS & GYNECOLOGY

## 2021-05-23 PROCEDURE — 58611 LIGATE OVIDUCT(S) ADD-ON: CPT | Performed by: OBSTETRICS & GYNECOLOGY

## 2021-05-23 PROCEDURE — 82570 ASSAY OF URINE CREATININE: CPT | Performed by: OBSTETRICS & GYNECOLOGY

## 2021-05-23 PROCEDURE — 88302 TISSUE EXAM BY PATHOLOGIST: CPT | Performed by: PATHOLOGY

## 2021-05-23 PROCEDURE — 4A1HXCZ MONITORING OF PRODUCTS OF CONCEPTION, CARDIAC RATE, EXTERNAL APPROACH: ICD-10-PCS | Performed by: OBSTETRICS & GYNECOLOGY

## 2021-05-23 PROCEDURE — 0UT20ZZ RESECTION OF BILATERAL OVARIES, OPEN APPROACH: ICD-10-PCS | Performed by: OBSTETRICS & GYNECOLOGY

## 2021-05-23 PROCEDURE — 99214 OFFICE O/P EST MOD 30 MIN: CPT

## 2021-05-23 PROCEDURE — 86592 SYPHILIS TEST NON-TREP QUAL: CPT | Performed by: OBSTETRICS & GYNECOLOGY

## 2021-05-23 PROCEDURE — NC001 PR NO CHARGE: Performed by: OBSTETRICS & GYNECOLOGY

## 2021-05-23 PROCEDURE — 0UB70ZZ EXCISION OF BILATERAL FALLOPIAN TUBES, OPEN APPROACH: ICD-10-PCS | Performed by: OBSTETRICS & GYNECOLOGY

## 2021-05-23 PROCEDURE — 86901 BLOOD TYPING SEROLOGIC RH(D): CPT | Performed by: OBSTETRICS & GYNECOLOGY

## 2021-05-23 PROCEDURE — 82805 BLOOD GASES W/O2 SATURATION: CPT | Performed by: OBSTETRICS & GYNECOLOGY

## 2021-05-23 RX ORDER — BUPIVACAINE HYDROCHLORIDE 7.5 MG/ML
INJECTION, SOLUTION INTRASPINAL AS NEEDED
Status: DISCONTINUED | OUTPATIENT
Start: 2021-05-23 | End: 2021-05-23

## 2021-05-23 RX ORDER — OXYCODONE HYDROCHLORIDE 5 MG/1
5 TABLET ORAL EVERY 4 HOURS PRN
Status: DISCONTINUED | OUTPATIENT
Start: 2021-05-24 | End: 2021-05-25 | Stop reason: HOSPADM

## 2021-05-23 RX ORDER — OXYTOCIN/RINGER'S LACTATE 30/500 ML
62.5 PLASTIC BAG, INJECTION (ML) INTRAVENOUS CONTINUOUS
Status: ACTIVE | OUTPATIENT
Start: 2021-05-23 | End: 2021-05-23

## 2021-05-23 RX ORDER — FENTANYL CITRATE 50 UG/ML
INJECTION, SOLUTION INTRAMUSCULAR; INTRAVENOUS AS NEEDED
Status: DISCONTINUED | OUTPATIENT
Start: 2021-05-23 | End: 2021-05-23

## 2021-05-23 RX ORDER — DOCUSATE SODIUM 100 MG/1
100 CAPSULE, LIQUID FILLED ORAL 2 TIMES DAILY
Status: DISCONTINUED | OUTPATIENT
Start: 2021-05-23 | End: 2021-05-25 | Stop reason: HOSPADM

## 2021-05-23 RX ORDER — ONDANSETRON 2 MG/ML
4 INJECTION INTRAMUSCULAR; INTRAVENOUS EVERY 8 HOURS PRN
Status: DISCONTINUED | OUTPATIENT
Start: 2021-05-24 | End: 2021-05-25 | Stop reason: HOSPADM

## 2021-05-23 RX ORDER — CEFAZOLIN SODIUM 2 G/50ML
2000 SOLUTION INTRAVENOUS ONCE
Status: DISCONTINUED | OUTPATIENT
Start: 2021-05-23 | End: 2021-05-23

## 2021-05-23 RX ORDER — DIPHENHYDRAMINE HYDROCHLORIDE 50 MG/ML
25 INJECTION INTRAMUSCULAR; INTRAVENOUS EVERY 6 HOURS PRN
Status: DISCONTINUED | OUTPATIENT
Start: 2021-05-23 | End: 2021-05-25 | Stop reason: HOSPADM

## 2021-05-23 RX ORDER — GLYCOPYRROLATE 0.2 MG/ML
INJECTION INTRAMUSCULAR; INTRAVENOUS AS NEEDED
Status: DISCONTINUED | OUTPATIENT
Start: 2021-05-23 | End: 2021-05-23

## 2021-05-23 RX ORDER — OXYTOCIN/RINGER'S LACTATE 30/500 ML
PLASTIC BAG, INJECTION (ML) INTRAVENOUS CONTINUOUS PRN
Status: DISCONTINUED | OUTPATIENT
Start: 2021-05-23 | End: 2021-05-23

## 2021-05-23 RX ORDER — KETOROLAC TROMETHAMINE 30 MG/ML
INJECTION, SOLUTION INTRAMUSCULAR; INTRAVENOUS AS NEEDED
Status: DISCONTINUED | OUTPATIENT
Start: 2021-05-23 | End: 2021-05-23

## 2021-05-23 RX ORDER — KETOROLAC TROMETHAMINE 30 MG/ML
30 INJECTION, SOLUTION INTRAMUSCULAR; INTRAVENOUS EVERY 6 HOURS PRN
Status: DISPENSED | OUTPATIENT
Start: 2021-05-23 | End: 2021-05-25

## 2021-05-23 RX ORDER — CALCIUM CARBONATE 200(500)MG
1000 TABLET,CHEWABLE ORAL DAILY PRN
Status: DISCONTINUED | OUTPATIENT
Start: 2021-05-23 | End: 2021-05-25 | Stop reason: HOSPADM

## 2021-05-23 RX ORDER — ACETAMINOPHEN 325 MG/1
650 TABLET ORAL EVERY 6 HOURS PRN
Status: DISCONTINUED | OUTPATIENT
Start: 2021-05-24 | End: 2021-05-25 | Stop reason: HOSPADM

## 2021-05-23 RX ORDER — DEXAMETHASONE SODIUM PHOSPHATE 4 MG/ML
8 INJECTION, SOLUTION INTRA-ARTICULAR; INTRALESIONAL; INTRAMUSCULAR; INTRAVENOUS; SOFT TISSUE ONCE AS NEEDED
Status: ACTIVE | OUTPATIENT
Start: 2021-05-23 | End: 2021-05-24

## 2021-05-23 RX ORDER — HYDROMORPHONE HCL/PF 1 MG/ML
0.2 SYRINGE (ML) INJECTION
Status: DISCONTINUED | OUTPATIENT
Start: 2021-05-23 | End: 2021-05-23

## 2021-05-23 RX ORDER — MORPHINE SULFATE 0.5 MG/ML
INJECTION, SOLUTION EPIDURAL; INTRATHECAL; INTRAVENOUS AS NEEDED
Status: DISCONTINUED | OUTPATIENT
Start: 2021-05-23 | End: 2021-05-23

## 2021-05-23 RX ORDER — ONDANSETRON 2 MG/ML
4 INJECTION INTRAMUSCULAR; INTRAVENOUS EVERY 4 HOURS PRN
Status: ACTIVE | OUTPATIENT
Start: 2021-05-23 | End: 2021-05-24

## 2021-05-23 RX ORDER — METOCLOPRAMIDE HYDROCHLORIDE 5 MG/ML
5 INJECTION INTRAMUSCULAR; INTRAVENOUS EVERY 6 HOURS PRN
Status: ACTIVE | OUTPATIENT
Start: 2021-05-23 | End: 2021-05-24

## 2021-05-23 RX ORDER — HYDROMORPHONE HCL/PF 1 MG/ML
1 SYRINGE (ML) INJECTION EVERY 2 HOUR PRN
Status: DISCONTINUED | OUTPATIENT
Start: 2021-05-24 | End: 2021-05-25 | Stop reason: HOSPADM

## 2021-05-23 RX ORDER — IBUPROFEN 600 MG/1
600 TABLET ORAL EVERY 6 HOURS PRN
Status: DISCONTINUED | OUTPATIENT
Start: 2021-05-25 | End: 2021-05-24

## 2021-05-23 RX ORDER — ONDANSETRON 2 MG/ML
4 INJECTION INTRAMUSCULAR; INTRAVENOUS EVERY 8 HOURS PRN
Status: DISCONTINUED | OUTPATIENT
Start: 2021-05-23 | End: 2021-05-23

## 2021-05-23 RX ORDER — NALOXONE HYDROCHLORIDE 0.4 MG/ML
0.1 INJECTION, SOLUTION INTRAMUSCULAR; INTRAVENOUS; SUBCUTANEOUS
Status: ACTIVE | OUTPATIENT
Start: 2021-05-23 | End: 2021-05-24

## 2021-05-23 RX ORDER — SODIUM CHLORIDE, SODIUM LACTATE, POTASSIUM CHLORIDE, CALCIUM CHLORIDE 600; 310; 30; 20 MG/100ML; MG/100ML; MG/100ML; MG/100ML
125 INJECTION, SOLUTION INTRAVENOUS CONTINUOUS
Status: DISCONTINUED | OUTPATIENT
Start: 2021-05-23 | End: 2021-05-25 | Stop reason: HOSPADM

## 2021-05-23 RX ORDER — OXYCODONE HYDROCHLORIDE 5 MG/1
10 TABLET ORAL EVERY 4 HOURS PRN
Status: DISCONTINUED | OUTPATIENT
Start: 2021-05-24 | End: 2021-05-25 | Stop reason: HOSPADM

## 2021-05-23 RX ORDER — NALBUPHINE HCL 10 MG/ML
5 AMPUL (ML) INJECTION
Status: DISCONTINUED | OUTPATIENT
Start: 2021-05-23 | End: 2021-05-25 | Stop reason: HOSPADM

## 2021-05-23 RX ORDER — NALBUPHINE HCL 10 MG/ML
5 AMPUL (ML) INJECTION ONCE AS NEEDED
Status: COMPLETED | OUTPATIENT
Start: 2021-05-23 | End: 2021-05-23

## 2021-05-23 RX ORDER — FENTANYL CITRATE/PF 50 MCG/ML
25 SYRINGE (ML) INJECTION
Status: DISCONTINUED | OUTPATIENT
Start: 2021-05-23 | End: 2021-05-23

## 2021-05-23 RX ORDER — CEFAZOLIN SODIUM 2 G/50ML
SOLUTION INTRAVENOUS AS NEEDED
Status: DISCONTINUED | OUTPATIENT
Start: 2021-05-23 | End: 2021-05-23

## 2021-05-23 RX ADMIN — DOCUSATE SODIUM 100 MG: 100 CAPSULE, LIQUID FILLED ORAL at 08:59

## 2021-05-23 RX ADMIN — NALBUPHINE HYDROCHLORIDE 5 MG: 10 INJECTION, SOLUTION INTRAMUSCULAR; INTRAVENOUS; SUBCUTANEOUS at 17:34

## 2021-05-23 RX ADMIN — SODIUM CHLORIDE, SODIUM LACTATE, POTASSIUM CHLORIDE, AND CALCIUM CHLORIDE: .6; .31; .03; .02 INJECTION, SOLUTION INTRAVENOUS at 01:42

## 2021-05-23 RX ADMIN — GLYCOPYRROLATE 0.1 MG: 0.2 INJECTION, SOLUTION INTRAMUSCULAR; INTRAVENOUS at 02:10

## 2021-05-23 RX ADMIN — KETOROLAC TROMETHAMINE 30 MG: 30 INJECTION, SOLUTION INTRAMUSCULAR at 11:13

## 2021-05-23 RX ADMIN — MORPHINE SULFATE 0.2 MG: 0.5 INJECTION, SOLUTION EPIDURAL; INTRATHECAL; INTRAVENOUS at 01:49

## 2021-05-23 RX ADMIN — FENTANYL CITRATE 15 MCG: 50 INJECTION, SOLUTION INTRAMUSCULAR; INTRAVENOUS at 01:49

## 2021-05-23 RX ADMIN — CEFAZOLIN SODIUM 2000 MG: 2 SOLUTION INTRAVENOUS at 01:52

## 2021-05-23 RX ADMIN — NALBUPHINE HYDROCHLORIDE 5 MG: 10 INJECTION, SOLUTION INTRAMUSCULAR; INTRAVENOUS; SUBCUTANEOUS at 04:51

## 2021-05-23 RX ADMIN — DOCUSATE SODIUM 100 MG: 100 CAPSULE, LIQUID FILLED ORAL at 17:34

## 2021-05-23 RX ADMIN — Medication 750 MILLI-UNITS/MIN: at 02:08

## 2021-05-23 RX ADMIN — BUPIVACAINE HYDROCHLORIDE IN DEXTROSE 1.6 ML: 7.5 INJECTION, SOLUTION SUBARACHNOID at 01:49

## 2021-05-23 RX ADMIN — PHENYLEPHRINE HYDROCHLORIDE 20 MCG/MIN: 10 INJECTION INTRAVENOUS at 01:53

## 2021-05-23 RX ADMIN — SODIUM CHLORIDE, SODIUM LACTATE, POTASSIUM CHLORIDE, AND CALCIUM CHLORIDE 1000 ML: .6; .31; .03; .02 INJECTION, SOLUTION INTRAVENOUS at 01:28

## 2021-05-23 RX ADMIN — SODIUM CHLORIDE, SODIUM LACTATE, POTASSIUM CHLORIDE, AND CALCIUM CHLORIDE 125 ML/HR: .6; .31; .03; .02 INJECTION, SOLUTION INTRAVENOUS at 11:38

## 2021-05-23 RX ADMIN — KETOROLAC TROMETHAMINE 30 MG: 30 INJECTION, SOLUTION INTRAMUSCULAR at 20:06

## 2021-05-23 RX ADMIN — SODIUM CHLORIDE, SODIUM LACTATE, POTASSIUM CHLORIDE, AND CALCIUM CHLORIDE 125 ML/HR: .6; .31; .03; .02 INJECTION, SOLUTION INTRAVENOUS at 03:50

## 2021-05-23 RX ADMIN — SODIUM CHLORIDE, SODIUM LACTATE, POTASSIUM CHLORIDE, AND CALCIUM CHLORIDE 1000 ML: .6; .31; .03; .02 INJECTION, SOLUTION INTRAVENOUS at 06:32

## 2021-05-23 RX ADMIN — KETOROLAC TROMETHAMINE 30 MG: 30 INJECTION, SOLUTION INTRAMUSCULAR at 02:38

## 2021-05-23 RX ADMIN — Medication 62.5 MILLI-UNITS/MIN: at 03:25

## 2021-05-23 NOTE — DISCHARGE SUMMARY
CS Discharge Summary - Judy Aguilar 38 y o  female MRN: [id number]    Unit/Bed#: LD PACU-01 Encounter: [id number]    Admission Date: 2021     Discharge Date: 21    Admitting Diagnosis:   45 weeks gestation of pregnancy  History of  section  GHTN  Obesity  AMA  Chiari Malformation  Request for permanent sterilization    Discharge Diagnosis:   Delivery via RLTCS  S/p Bilateral salpingectomy  History of  section  GHTN  Obesity  AMA  Chiari Malformation    Procedures:   repeat  section, low transverse incision  Bilateral salpingectomy    Admitting Attending: Dr Gwen Sinha  Delivery Attending: Dr Gwen Sinha  Discharge Attending: Dr Maurice Rojas service: none  Consult attending: none    Hospital Course:     Judy Aguilar is a 38 y o  Barbi Toshia who was admitted in labor for repeat low transverse  section and tubal sterilization  She then underwent an uncomplicated  section delivery and bilateral salpingectomy and delivered a viable male  on  at 0209  APGARS were 9, 9 at 1 and 5 minutes, respectively   weighed 8lb 4 5oz   was then transferred to  nursery  Patient tolerated the procedure well and was transferred to recovery in stable condition  The patient's post partum course was unremarkable    Preoperative hemoglobin was 10 8, postoperative was 9 3  Her postoperative pain was well controlled with oral analgesics  She was diagnosed with gHTN but did not have any severe range blood pressures and had normal preeclampsia labs  On day of discharge, she was ambulating and able to reasonably perform all ADLs  She was voiding and had appropriate bowel function  Pain was well controlled  She was discharged home on post-operative day #2 without complications   Patient was instructed to follow up with her OB as an outpatient and was given appropriate warnings to call provider if she develops signs of infection or uncontrolled pain      Complications:   None    Condition at discharge:   good     Provisions for Follow-Up Care:  See after visit summary for information related to follow-up care and any pertinent home health orders  Disposition:   Home    Planned Readmission:   No    Discharge Medications:   Prenatal vitamin daily for 6 months or the duration of nursing whichever is longer  Motrin 600 mg orally every 6 hours as needed for pain  Tylenol (over the counter) per bottle directions as needed for pain  Hydrocortisone cream 1% (over the counter) applied 1-2x daily to hemorrhoids as needed  Witch hazel pads for hemorrhoidal discomfort as needed      Discharge instructions :   -Do not place anything (no partner, tampons or douche) in your vagina for 6 weeks  -You may walk for exercise for the first 6 weeks then gradually return to your usual activities    -Please do not drive for 1 week if you have no stitches and for 2 weeks if you have stitches or underwent a  delivery     -You may take baths or shower per your preference    -Please look at your bust (breasts) in the mirror daily and call provider for redness or tenderness or increased warmth  - If you have had a  please look at your incision daily as well and call provider for increasing redness or steady drainage from the incision    -Please call your provider if temperature > 100 4*F or 38* C, worsening pain or a foul discharge      Toya Abraham MD  PGY-1 OB/GYN   2021 1:38 PM

## 2021-05-23 NOTE — ANESTHESIA PROCEDURE NOTES
Spinal Block    Patient location during procedure: OR  Start time: 5/23/2021 1:49 AM  Reason for block: procedure for pain and at surgeon's request  Staffing  Anesthesiologist: Princess Tristan MD  Resident/CRNA: Epifanio Lees CRNA  Performed: resident/CRNA   Preanesthetic Checklist  Completed: patient identified, site marked, surgical consent, pre-op evaluation, timeout performed, IV checked, risks and benefits discussed and monitors and equipment checked  Spinal Block  Patient position: sitting  Prep: ChloraPrep  Patient monitoring: frequent blood pressure checks, continuous pulse ox and heart rate  Approach: midline  Location: L3-4  Injection technique: single-shot  Needle  Needle type: pencil-tip   Needle gauge: 25 G  Needle length: 10 cm  Assessment  Sensory level: T4  Injection Assessment:  negative aspiration for heme, no paresthesia on injection and positive aspiration for clear CSF    Post-procedure:  adhesive bandage applied, pressure dressing applied, secured with tape, site cleaned and sterile dressing applied

## 2021-05-23 NOTE — DISCHARGE INSTRUCTIONS
Section   WHAT YOU SHOULD KNOW:   A  delivery, or , is abdominal surgery to deliver your baby  There are many reasons you may need a   · A  may be scheduled before labor if you had a  with your last baby  It may be scheduled if your baby is not positioned normally, or you are pregnant with more than 1 baby  · Your caregiver may perform an emergency  during labor to prevent life-threatening complications for you or your baby  A  may be done if your cervix does not dilate after several hours of active labor  · Other reasons for a  include maternal infections and problems with the placenta  AFTER YOU LEAVE:   Medicines:   · Prescription pain medicine  may be given  Ask how to take this medicine safely  · Acetaminophen  decreases pain and fever  It is available without a doctor's order  Ask how much to take and how often to take it  Follow directions  Acetaminophen can cause liver damage if not taken correctly  · NSAIDs  help decrease swelling and pain or fever  This medicine is available with or without a doctor's order  NSAIDs can cause stomach bleeding or kidney problems in certain people  If you take blood thinner medicine, always ask your obstetrician if NSAIDs are safe for you  Always read the medicine label and follow directions  · Take your medicine as directed  Contact your obstetrician (OB) if you think your medicine is not helping or if you have side effects  Tell him if you are allergic to any medicine  Keep a list of the medicines, vitamins, and herbs you take  Include the amounts, and when and why you take them  Bring the list or the pill bottles to follow-up visits  Carry your medicine list with you in case of an emergency  Follow up with your OB as directed: You may need to return to have your stitches or staples removed  Write down your questions so you remember to ask them during your visits    Wound care:  Carefully wash your wound with soap and water every day  Keep your wound clean and dry  Wear loose, comfortable clothes that do not rub against your wound  Ask your OB about bathing and showering  Drink plenty of liquids: You can lower your risk for a blood clot if you drink plenty of liquids  Ask how much liquid to drink each day and which liquids are best for you  Limit activity until you have fully recovered from surgery:   · Ask when it is safe for you to drive, walk up stairs, lift heavy objects, and have sex  · Ask when it is okay to exercise, and what types of exercise to do  Start slowly and do more as you get stronger  Contact your OB if:   · You have heavy vaginal bleeding that fills 1 or more sanitary pads in 1 hour  · You have a fever  · Your incision is swollen, red, or draining pus  · You have questions or concerns about yourself or your baby  Seek care immediately or call 911 if:   · Blood soaks through your bandage  · Your stitches come apart  · You feel lightheaded, short of breath, and have chest pain  · You cough up blood  · Your arm or leg feels warm, tender, and painful  It may look swollen and red  © 2014 [address] is for End User's use only and may not be sold, redistributed or otherwise used for commercial purposes  All illustrations and images included in CareNotes® are the copyrighted property of A D A M , Inc  or Roderick Kelley  The above information is an  only  It is not intended as medical advice for individual conditions or treatments  Talk to your doctor, nurse or pharmacist before following any medical regimen to see if it is safe and effective for you

## 2021-05-23 NOTE — PROGRESS NOTES
Jaxon Ortiz  [id number]  5/23/2021  5:34 AM    POST-OP CHECK     S:  Jaxon Ortiz doing well  Pain controlled  Denies nausea/vomiting  Denies chest pain, shortness of breath  No complaints at this time  O:  Vitals:    05/23/21 0447   BP: 123/76   Pulse: 66   Resp: 18   Temp: 98 8 °F (37 1 °C)   SpO2: 96%       Physical Exam  Vitals signs reviewed  Exam conducted with a chaperone present  Constitutional:       General: She is not in acute distress  Appearance: Normal appearance  She is normal weight  She is not ill-appearing, toxic-appearing or diaphoretic  Cardiovascular:      Rate and Rhythm: Normal rate and regular rhythm  Heart sounds: No murmur  No friction rub  No gallop  Pulmonary:      Effort: Pulmonary effort is normal  No respiratory distress  Breath sounds: Normal breath sounds  No stridor  No wheezing or rales  Abdominal:      General: Abdomen is flat  There is no distension  Palpations: There is no mass  Tenderness: There is no abdominal tenderness  There is no guarding or rebound  Hernia: No hernia is present  Comments: Incision clean, dry, and intact without evidence of infection, erythema, or bleeding  Genitourinary:     Comments: Uterus firm at the umbilicus, nontender  Neurological:      Mental Status: She is alert  A:  Jaxon Ortiz is s/p RLTCS & bilateral salpingectomy earlier this morning, doing well except for oliguria     P:  Routine postop check  Oliguria noted: 25cc/2hr, Will administer fluid bolus and follow up  IV/PO pain meds as needed  Regular diet as tolerated  Antiemetics for nausea/vomiting prn  Follow up noon labs   SCDs for DVT ppx, encourage ambulation as tolerated  Poonam Mclean MD  OB/GYN  5/23/2021  5:34 AM

## 2021-05-23 NOTE — OP NOTE
Section Procedure Note    Indications:   Maternal request for repeat  section    Pre-operative Diagnosis:   38 weeks gestation of pregnancy  History of  section  GHTN  Obesity  AMA  Chiari Malformation  Request for permanent sterilization    Post-operative Diagnosis:   Delivery via RLTCS  S/p Bilateral salpingectomy  History of  section  GHTN  Obesity  AMA  Chiari Malformation    Attending: Dr Jf Alvarado  Resident: Natalie Roche MD    Maternal Findings:  Normal uterus  Normal tubes and ovaries bilaterally  No adhesions  No difficulty noted from skin to delivery     Findings:  Viable male weighing 8lbs 4 5oz;  Apgar scores of 9 at one minute and 9 at five minutes  Meconium stained amniotic fluid  Normal placenta with 3-vessel cord    Arterial and Venous Gases:  Umbilical Cord Venous Blood Gas:  Results from last 7 days   Lab Units 21  0150   PH COV  7 356   PCO2 COV mm HG 48 3*   HCO3 COV mmol/L 26 4   BASE EXC COV mmol/L 0 2*   O2 CT CD VB mL/dL 7 7   O2 HGB, VENOUS CORD % 86 3     Umbilical Cord Arterial Blood Gas:  Results from last 7 days   Lab Units 21  0150   PH COA  7 269   PCO2 COA  60 0   PO2 COA mm HG 12 0   HCO3 COA mmol/L 26 9   BASE EXC COA mmol/L -1 6*   O2 CONTENT CORD ART ml/dl 4 4   O2 HGB, ARTERIAL CORD % 19 0       Specimens: Arterial and venous cord gases, cord blood, segment of umbilical cord, placenta to pathology    Quantitative Blood Loss: 604 mL    Drains: Ballard catheter           Complications:  None; patient tolerated the procedure well  Disposition: PACU            Condition: stable    Brief Labor Course:   Patient was admitted in labor for repeat low transverse  section with bilateral salpingectomy  Procedure Details   The patient was seen prior to the procedure  Risks, benefits, possible complications, alternate treatment options, and expected outcomes were discussed with the patient    The patient agreed with the proposed plan and gave informed consent for a RLTCS and tubal sterilization  The patient was taken to the [facility] Operating Room where she received spinal anesthesia  For infection prophylaxis, she received Ancef 2g IV preoperatively  Fetal heart tones in the OR were assessed and noted to be within normal limits and a Ballard catheter and SCDs were placed  The abdomen was prepped with Chloraprep, the vagina was prepped with chlorhexidine, and following appropriate drying time, the patient was draped in the usual sterile manner  A Time Out was held and the above information confirmed  The patient was identified as Patricia Canela and the procedure verified as a  Delivery  A Pfannenstiel incision was made and carried down through the underlying subcutaneous tissue to the fascia using a scalpel  The rectus fascia was then nicked in the midline and dissected laterally using Mckeon scissors  The superior edge of the  fascial incision was grasped with Kocher clamps bilaterally, tented upward and the underlying rectus muscles were dissected off sharply with Mckeon scissors  The rectus muscles were  and the peritoneum was identified, entered, and extended longitudinally with blunt dissection  The [place] was inserted  A low transverse uterine incision was made with the scalpel and extended laterally with blunt dissection  The amnion was entered bluntly  The fetal head was palpated, elevated, and delivered through the uterine incision followed by the body without difficulty  There was noted to be spontaneous cry and good tone  There was no apparent injury to the   The umbilical cord was doubly clamped and cut after 30 seconds to allow for delayed cord clamping  The infant was handed off to the  providers  Arterial and venous cord gases, cord blood, and a segment of umbilical cord were obtained for evaluation    The placenta delivered spontaneously with uterine fundal massage and appeared normal  The uterus was cleaned out with a moist lap sponge  The uterine incision was closed with a running locked suture of 2-0 Stratafix PDS  A second layer of the same suture was used to imbricate the first   Hemostasis was noted to be excellent  At this point, our attention turned to the adnexa  The bilateral Fallopian tubes were identified  First the right Fallopian tube was identified and grasped with a Delilah Ancelmo in an avascular area and tented up  The Bovie was used to create windows in the mesosalpinx in the avascular spaces  The mesosalpinx was tied with 3-0 Vicryl to secure the pedicles and the tube was removed with Metzenbaum scissors  Good hemostasis was noted from this tube  The right Fallopian tube was identified and removed in similar fashion  Good hemostasis was noted on this side  Bilateral tubes were sent to pathology for routine evaluation  The Joey retractor was removed  The fascia was closed with a running suture of Stratafix +1 PDS  The skin was closed with a subcuticular running suture of 4-0 Stratafix  Exofin was applied    The patient appeared to tolerate the procedure very well  Lap sponge, needle, and instrument counts were correct x2  The patient was transferred to her postpartum recovery room in stable condition and her infant went to the  nursery  Attending Attestation: Dr Maranda Dakin, MD was present for the entire procedure  Camilla Massey MD  OB/GYN  2021  3:06 AM  Pager: [phone]

## 2021-05-23 NOTE — H&P
H&P Exam - Obstetrics   Vicente Patel 38 y o  female MRN: [id number]  Unit/Bed#:  TRIAGE  Encounter: [id number]      History of Present Illness     Chief Complaint: contractions    HPI:  Vicente Patel is a 38 y o   female with an EH of 2021, by Last Menstrual Period at 38w4d weeks gestation who is being admitted for repeat low-transverse  section sitting in labor  Contractions: Patient states that her contractions started at 2130 and are now every 2 minutes  Loss of fluid: Denies  Vaginal bleeding: Denies  Fetal movement: Good    She is Dr Vidal Kingston patient  PREGNANCY COMPLICATIONS:   1) History of prior  section  2) gHTN  3) Hx Preeclampsia  4) AMA  5) Chiari Malformation  6) Asthma    OB History    Para Term  AB Living   2 1 1     1   SAB TAB Ectopic Multiple Live Births         0 1      # Outcome Date GA Lbr Mukund/2nd Weight Sex Delivery Anes PTL Lv   2 Current            1 Term 16 38w6d / 03:25 3820 g (8 lb 6 8 oz) M CS-LVertical EPI N JEANNETTE      Complications: Failure to Progress in Second Stage       Historical Information   Past Medical History:   Diagnosis Date    ADHD (attention deficit hyperactivity disorder)     Asthma     sports induced    Chiari malformation type II ([facility] [address] )     Clostridium difficile infection     CTS (carpal tunnel syndrome)     Migraine     Papanicolaou smear for cervical cancer screening 2018    neg    Varicella     childhood     Past Surgical History:   Procedure Laterality Date    ACHILLES TENDON LENGTHENING      [place]  DELIVERY ONLY N/A 2016    Procedure:  SECTION ();   Surgeon: Ash Woods MD;  Location: [facility] ;  Service: Obstetrics    [place] LAP,CHOLECYSTECTOMY N/A 2019    Procedure: CHOLECYSTECTOMY LAPAROSCOPIC;  Surgeon: Nellie Kessler DO;  Location: [facility];  Service: General    WISDOM TOOTH EXTRACTION       Social History   Social History     Substance and Sexual Activity   Alcohol Use Not Currently    Comment: social     Social History     Substance and Sexual Activity   Drug Use No     Social History     Tobacco Use   Smoking Status Never Smoker   Smokeless Tobacco Never Used     Family History: non-contributory    Meds/Allergies      Medications Prior to Admission   Medication    aspirin (ECOTRIN LOW STRENGTH) 81 mg EC tablet    Butalbital-APAP-Caffeine (Fioricet) -40 MG CAPS    Cholecalciferol 25 MCG (1000 UT) CHEW    ciclopirox (PENLAC) 8 % solution    clotrimazole-betamethasone (LOTRISONE) 1-0 05 % cream    ondansetron (ZOFRAN-ODT) 4 mg disintegrating tablet    Prenatal Vit-Iron Carbonyl-FA (prenatal multivitamin) TABS    cetirizine (ZyrTEC) 10 mg tablet    Doxylamine Succinate, Sleep, (UNISOM PO)    fluticasone-vilanterol (BREO ELLIPTA) 100-25 mcg/inh inhaler    labetalol (NORMODYNE) 100 mg tablet    labetalol (NORMODYNE) 200 mg tablet      No Known Allergies    OBJECTIVE:    Vitals: Blood pressure 140/84, pulse 83, temperature 98 2 °F (36 8 °C), temperature source Oral, resp  rate 18, last menstrual period 08/26/2020, SpO2 96 %, not currently breastfeeding  There is no height or weight on file to calculate BMI  Physical Exam  Constitutional:       General: She is not in acute distress  Appearance: She is well-developed  She is not diaphoretic  HENT:      Head: Normocephalic and atraumatic  Cardiovascular:      Rate and Rhythm: Normal rate and regular rhythm  Heart sounds: Normal heart sounds  No murmur  No friction rub  No gallop  Pulmonary:      Effort: Pulmonary effort is normal  No respiratory distress  Breath sounds: Normal breath sounds  No wheezing or rales  Abdominal:      General: There is no distension  Palpations: Abdomen is soft  Tenderness: There is no abdominal tenderness  There is no guarding or rebound  Genitourinary:     Comments: Gravid uterus, nontender  Skin:     General: Skin is warm and dry  Neurological:      Mental Status: She is alert and oriented to person, place, and time  Psychiatric:         Behavior: Behavior normal          Thought Content: Thought content normal          Judgment: Judgment normal              Cervix:    5/80/-1    Fetal heart rate:    120/ moderate/ accelerations/ no decelerations    [place]:    contractions irregular    EFW: 8lbs    GBS: negative    Prenatal Labs:   Blood Type:   Lab Results   Component Value Date/Time    ABO Grouping O 2020 10:17 AM     , D (Rh type):   Lab Results   Component Value Date/Time    Rh Factor Positive 2020 10:17 AM     , Antibody Screen: negative , 1 hour Glucola:   Lab Results   Component Value Date/Time    Glucose 118 2021 04:34 PM   , Rubella:   Lab Results   Component Value Date/Time    Rubella IgG Quant >175 0 2020 10:17 AM        , VDRL/RPR:   Lab Results   Component Value Date/Time    RPR Non-Reactive 2021 04:34 PM      , Hep B:   Lab Results   Component Value Date/Time    Hepatitis B Surface Ag Non-reactive 2020 10:17 AM     , HIV:   Lab Results   Component Value Date/Time    HIV-1/HIV-2 Ab Non-Reactive 2020 10:17 AM         Invasive Devices     Peripheral Intravenous Line            Peripheral IV 21 Dorsal (posterior); Left Wrist less than 1 day                  Assessment/Plan     ASSESSMENT:  44yo  at 38w4d weeks gestation who is being admitted for repeat low-transverse  section  PLAN:   1) Admit   2) CBC, RPR, Blood Type   3) Proceed to the OR   4) Ancef 2g ordered    This patient will be an INPATIENT  and I certify the anticipated length of stay is >2 Midnights  Tj Margeurite Floyd MD  2021  1:34 AM

## 2021-05-23 NOTE — ANESTHESIA POSTPROCEDURE EVALUATION
Post-Op Assessment Note    CV Status:  Stable  Pain Score: 0    Pain management: adequate     Mental Status:  Alert and awake   Hydration Status:  Euvolemic   PONV Controlled:  Controlled   Airway Patency:  Patent      Post Op Vitals Reviewed: Yes      Staff: CRNA   Comments: Pt awake, alert, able to maintain own airway, VSS, report to recovery RN        No complications documented      BP   111/56   Temp   99   Pulse  65   Resp   16   SpO2   98%

## 2021-05-23 NOTE — LACTATION NOTE
This note was copied from a baby's chart  Latch assessment: mom called to request help with latching to left breast  Early feeding cues were being demonstrated  Encouraged S2S feedings with pillows to bring Lenny up to left breast  Mom began hand expression  Encouraged to use "C" hold on breast  Align nipple to nose, drag down to chin to achieve a wide, deep latch  Lenny latched to breast and actively suckled  Education provided on how baby can breathe on breast and to make sure breast isn't pulled out of baby's mouth or causes shallow latch with checking for breathing  FOB confirmed baby could breathe  Breast compressions demonstrated with ways to increase milk transfer  Education provided on non-nutritive suck and breathing and muscle breaks  Encouraged to place baby on opposite breast  When signs of satiation occur  Offer both breasts at every breastfeeding session  Repositioning was demonstrated in , football,  Position Laurie utilized Pillows to bring baby up to the breast  Mom is encouraged to place baby skin to skin for feedings  Skin to skin education provided for baby placement on mother's chest, baby only in diaper, blankets below shoulders on baby's back  Skin to skin is encouraged to continue at home for feedings and between feedings  Provided demonstration, education and support of deep latch to breast by placing the nipple to the nose, dragging down to chin to achieve a wide latch  Bring baby to the breast, not breast to baby  Move your shoulders down and away from your ears  Look for ear, shoulder, hip alignment  Baby's upper and lower lip should be flanged on the breast Encouraged parents to call for assistance, questions, and concerns about breastfeeding  Extension provided

## 2021-05-23 NOTE — ANESTHESIA PREPROCEDURE EVALUATION
Procedure:   SECTION () REPEAT (N/A Uterus)    Relevant Problems   GYN   (+) 38 weeks gestation of pregnancy   (+) AMA (advanced maternal age) multigravida 35+      MUSCULOSKELETAL   (+) Back pain      NEURO/PSYCH   (+) History of pre-eclampsia   (+) Occipital headache   (+) Paresthesia of left arm   (+) Paresthesia of right arm      PULMONARY   (+) Mild persistent asthma without complication      Results for Jordyn Guillen (MRN [id number]) as of 2021 01:51   Ref  Range 2021 01:24   WBC Latest Ref Range: 4 31 - 10 16 Thousand/uL 14 25 (H)   Red Blood Cell Count Latest Ref Range: 3 81 - 5 12 Million/uL 4 41   Hemoglobin Latest Ref Range: 11 5 - 15 4 g/dL 10 8 (L)   HCT Latest Ref Range: 34 8 - 46 1 % 34 2 (L)   MCV Latest Ref Range: 82 - 98 fL 78 (L)   MCH Latest Ref Range: 26 8 - 34 3 pg 24 5 (L)   MCHC Latest Ref Range: 31 4 - 37 4 g/dL 31 6   RDW Latest Ref Range: 11 6 - 15 1 % 14 3   Platelet Count Latest Ref Range: 149 - 390 Thousands/uL 286   MPV Latest Ref Range: 8 9 - 12 7 fL 13 2 (H)          Anesthesia Plan  ASA Score- 3     Anesthesia Type- spinal with ASA Monitors  Additional Monitors:   Airway Plan:           Plan Factors-Exercise tolerance (METS): >4 METS  Chart reviewed  EKG reviewed  Imaging results reviewed  Existing labs reviewed  Patient summary reviewed  Induction- intravenous  Postoperative Plan-     Informed Consent- Anesthetic plan and risks discussed with patient  I personally reviewed this patient with the CRNA  Discussed and agreed on the Anesthesia Plan with the LYNDSEY Reyes

## 2021-05-23 NOTE — LACTATION NOTE
This note was copied from a baby's chart  CONSULT - LACTATION  Baby Boy Marley Negrete 0 days male MRN: [id number]    [facility] NURSERY Room / Bed: (N)/(N) Encounter: [id number]    Maternal Information     MOTHER:  Laurie Negrete  Maternal Age: 38 y o    OB History: # 1 - Date: 16, Sex: Male, Weight: 3820 g (8 lb 6 8 oz), GA: 38w6d, Delivery: , Low Vertical, Apgar1: 8, Apgar5: 9, Living: Living, Birth Comments: None    # 2 - Date: 21, Sex: Male, Weight: 3755 g (8 lb 4 5 oz), GA: 38w4d, Delivery: , Low Transverse, Apgar1: 9, Apgar5: 9, Living: Living, Birth Comments: None   Previouse breast reduction surgery? No    Lactation history:   Has patient previously breast fed: Yes   How long had patient previously breast fed: 6 mo   Previous breast feeding complications: None     Past Surgical History:   Procedure Laterality Date    ACHILLES TENDON LENGTHENING      [place]  DELIVERY ONLY N/A 2016    Procedure:  SECTION ();   Surgeon: Keyur Santos MD;  Location: [facility] ;  Service: Obstetrics    [place] LAP,CHOLECYSTECTOMY N/A 2019    Procedure: CHOLECYSTECTOMY LAPAROSCOPIC;  Surgeon: Eduard Bourgeois DO;  [facility];  Service: General    WISDOM TOOTH EXTRACTION          Birth information:  YOB: 2023   Time of birth: 1:80 AM   Sex: male   Delivery type: , Low Transverse   Birth Weight: 3755 g (8 lb 4 5 oz)   Percent of Weight Change: 0%     Gestational Age: 38w3d   [unfilled]    Assessment     Breast and nipple assessment: no clincial assessment completed    [place] Assessment: no clinical assessment completed    Feeding assessment: feeding well as per parents  LATCH:  Latch: Grasps breast, tongue down, lips flanged, rhythmic sucking   Audible Swallowing: Spontaneous and intermittent (24 hours old)   Type of Nipple: Everted (After stimulation)   Comfort (Breast/Nipple): Soft/non-tender   Hold (Positioning): Full assist, staff holds infant at breast   LATCH Score: 8          Feeding recommendations:  breast feed on demand  Mom states breastfeeding is going well but she is having a longer time and some difficulty latching to the left breast  Provided education on alignment of nipple to nose, drag down to chin to achieve a wide, deep latch  Provided education on hand expression  Mom states hs has been hand expressing and this does entice baby to latch  Encourage to call lactation if she wishes to have assistance with latch  Provided education on how to use feeding log, Mom states she is would like the Ameda pump  Order placed to case mgt  Met with mother  Provided mother with Ready, Set, Baby booklet  Discussed Skin to Skin contact an benefits to mom and baby  Talked about the delay of the first bath until baby has adjusted  Spoke about the benefits of rooming in  Feeding on cue and what that means for recognizing infant's hunger  Avoidance of pacifiers for the first month discussed  Talked about exclusive breastfeeding for the first 6 months  Positioning and latch reviewed as well as showing images of other feeding positions  Discussed the properties of a good latch in any position  Reviewed hand/manual expression  Discussed s/s that baby is getting enough milk and some s/s that breastfeeding dyad may need further help  Gave information on common concerns, what to expect the first few weeks after delivery, preparing for other caregivers, and how partners can help  Resources for support also provided  Information on hand expression given  Discussed benefits of knowing how to manually express breast including stimulating milk supply, softening nipple for latch and evacuating breast in the event of engorgement      Mom would like a Ameda  breast pump for home use  Case management consult/order entered  Encouraged parents to call for assistance, questions, and concerns about breastfeeding  Extension provided      Edvin Izaguirre, [facility] Nik 5/23/2021 5:00 PM

## 2021-05-23 NOTE — PLAN OF CARE
Problem: PAIN - ADULT  Goal: Verbalizes/displays adequate comfort level or baseline comfort level  Description: Interventions:  - Encourage patient to monitor pain and request assistance  - Assess pain using appropriate pain scale  - Administer analgesics based on type and severity of pain and evaluate response  - Implement non-pharmacological measures as appropriate and evaluate response  - Consider cultural and social influences on pain and pain management  - Notify physician/advanced practitioner if interventions unsuccessful or patient reports new pain  5/23/2021 0227 by Belinda Green RN  Outcome: Progressing  5/23/2021 0227 by Bleinda Green RN  Outcome: Progressing     Problem: INFECTION - ADULT  Goal: Absence or prevention of progression during hospitalization  Description: INTERVENTIONS:  - Assess and monitor for signs and symptoms of infection  - Monitor lab/diagnostic results  - Monitor all insertion sites, i e  indwelling lines, tubes, and drains  - Monitor endotracheal if appropriate and nasal secretions for changes in amount and color  - [place] appropriate cooling/warming therapies per order  - Administer medications as ordered  - Instruct and encourage patient and family to use good hand hygiene technique  - Identify and instruct in appropriate isolation precautions for identified infection/condition  5/23/2021 0227 by Belinda Green RN  Outcome: Progressing  5/23/2021 0227 by Belinda Green RN  Outcome: Progressing  Goal: Absence of fever/infection during neutropenic period  Description: INTERVENTIONS:  - Monitor WBC    5/23/2021 0227 by Belinda Green RN  Outcome: Progressing  5/23/2021 0227 by Belinda Green RN  Outcome: Progressing     Problem: SAFETY ADULT  Goal: Patient will remain free of falls  Description: INTERVENTIONS:  - Assess patient frequently for physical needs  -  Identify cognitive and physical deficits and behaviors that affect risk of falls    -  [place] fall precautions as indicated by assessment   - Educate patient/family on patient safety including physical limitations  - Instruct patient to call for assistance with activity based on assessment  - Modify environment to reduce risk of injury  - Consider OT/PT consult to assist with strengthening/mobility  5/23/2021 0227 by Eiatn Ozuna RN  Outcome: Progressing  5/23/2021 0227 by Eitan Ozuna RN  Outcome: Progressing  Goal: Maintain or return to baseline ADL function  Description: INTERVENTIONS:  -  Assess patient's ability to carry out ADLs; assess patient's baseline for ADL function and identify physical deficits which impact ability to perform ADLs (bathing, care of mouth/teeth, toileting, grooming, dressing, etc )  - Assess/evaluate cause of self-care deficits   - Assess range of motion  - Assess patient's mobility; develop plan if impaired  - Assess patient's need for assistive devices and provide as appropriate  - Encourage maximum independence but intervene and supervise when necessary  - Involve family in performance of ADLs  - Assess for home care needs following discharge   - Consider OT consult to assist with ADL evaluation and planning for discharge  - Provide patient education as appropriate  5/23/2021 0227 by Eitan Ozuna RN  Outcome: Progressing  5/23/2021 0227 by Eitan Ozuna RN  Outcome: Progressing  Goal: Maintain or return mobility status to optimal level  Description: INTERVENTIONS:  - Assess patient's baseline mobility status (ambulation, transfers, stairs, etc )    - Identify cognitive and physical deficits and behaviors that affect mobility  - Identify mobility aids required to assist with transfers and/or ambulation (gait belt, sit-to-stand, lift, walker, cane, etc )  - [place] fall precautions as indicated by assessment  - Record patient progress and toleration of activity level on Mobility SBAR; progress patient to next Phase/Stage  - Instruct patient to call for assistance with activity based on assessment  - Consider rehabilitation consult to assist with strengthening/weightbearing, etc   5/23/2021 0227 by Gary Sahu RN  Outcome: Progressing  5/23/2021 0227 by Gary Sahu RN  Outcome: Progressing     Problem: Knowledge Deficit  Goal: Patient/family/caregiver demonstrates understanding of disease process, treatment plan, medications, and discharge instructions  Description: Complete learning assessment and assess knowledge base    Interventions:  - Provide teaching at level of understanding  - Provide teaching via preferred learning methods  5/23/2021 0227 by Gary Sahu RN  Outcome: Progressing  5/23/2021 0227 by Gary Sahu RN  Outcome: Progressing     Problem: DISCHARGE PLANNING  Goal: Discharge to home or other facility with appropriate resources  Description: INTERVENTIONS:  - Identify barriers to discharge w/patient and caregiver  - Arrange for needed discharge resources and transportation as appropriate  - Identify discharge learning needs (meds, wound care, etc )  - Arrange for interpretive services to assist at discharge as needed  - Refer to Case Management Department for coordinating discharge planning if the patient needs post-hospital services based on physician/advanced practitioner order or complex needs related to functional status, cognitive ability, or social support system  5/23/2021 0227 by Gary Sahu RN  Outcome: Progressing  5/23/2021 0227 by Gary Sahu RN  Outcome: Progressing

## 2021-05-24 LAB — RPR SER QL: NORMAL

## 2021-05-24 PROCEDURE — 99024 POSTOP FOLLOW-UP VISIT: CPT | Performed by: OBSTETRICS & GYNECOLOGY

## 2021-05-24 RX ORDER — SIMETHICONE 20 MG/.3ML
40 EMULSION ORAL EVERY 6 HOURS PRN
Status: DISCONTINUED | OUTPATIENT
Start: 2021-05-24 | End: 2021-05-25 | Stop reason: HOSPADM

## 2021-05-24 RX ORDER — IBUPROFEN 600 MG/1
600 TABLET ORAL EVERY 6 HOURS PRN
Status: DISCONTINUED | OUTPATIENT
Start: 2021-05-25 | End: 2021-05-25 | Stop reason: HOSPADM

## 2021-05-24 RX ADMIN — DOCUSATE SODIUM 100 MG: 100 CAPSULE, LIQUID FILLED ORAL at 18:53

## 2021-05-24 RX ADMIN — ACETAMINOPHEN 650 MG: 325 TABLET, FILM COATED ORAL at 03:45

## 2021-05-24 RX ADMIN — ACETAMINOPHEN 650 MG: 325 TABLET, FILM COATED ORAL at 18:53

## 2021-05-24 RX ADMIN — SIMETHICONE 40 MG: 20 EMULSION ORAL at 18:53

## 2021-05-24 RX ADMIN — ACETAMINOPHEN 650 MG: 325 TABLET, FILM COATED ORAL at 12:50

## 2021-05-24 RX ADMIN — DOCUSATE SODIUM 100 MG: 100 CAPSULE, LIQUID FILLED ORAL at 08:32

## 2021-05-24 NOTE — UTILIZATION REVIEW
Inpatient Admission Authorization Request   Notification of Maternity/Delivery &  Birth Information for Admission   SERVICING FAC[facility]   [address] [facility] [address] [place] [address]  Tax ID: [id number]  NPI: [id number]  Place of Service: [facility]patient [facility]  [address]  Place of Service Code: 24     ATTENDING PROVIDER:  Attending Name and NPI#: Anamaria Ware, [address] Anish Echevarria [[id number]]  Address: [facility], [address] [place] [address]  Phone: [phone]     UTILIZATION REVIEW CONTACT:  Justin Giang Utilization   Network Utilization Review Department  Phone: [phone]  Fax [phone]  Email: [email or web address]     PHYSICIAN ADVISORY SERVICES:  FOR [id number] REVIEW - MEDICAL NECESSITY DENIAL  Phone: [phone]  Fax: [phone]  Email: [email or web address]     TYPE OF REQUEST:  Inpatient Status     ADMISSION INFORMATION:  ADMISSION DATE/TIME: 21 0118  PATIENT DIAGNOSIS CODE/DESCRIPTION:  Uterine contractions during pregnancy [O62 2]  38 weeks gestation of pregnancy [Z3A 38]  Encounter for  delivery without indication [O82]  38 weeks gestation of pregnancy [Z3A 38] The encounter diagnosis was Previous  section complicating pregnancy  1  Previous  section complicating pregnancy      DISCHARGE DATE/TIME: No discharge date for patient encounter    DISCHARGE DISPOSITION (IF DISCHARGED): Home/Self Care     MOTHER AND  INFORMATION:  Mother: Natalio Sun 1985   Delivering clinician: Johnnie Medel   OB History        2    Para   2    Term   2            AB        Living   2       SAB        TAB        Ectopic        Multiple   0    Live Births   2               [place] Name & MRN:   Information for the patient's :  Marc Meek [facility]) [[id number]]      Delivery Information:  Sex: male  Delivered 2021 2:09 AM by , Low Transverse; Gestational Age: 38w3d     Measurements:  Weight: 8 lb 4 5 oz (3755 g); Height: 20"    APGAR 1 minute 5 minutes 10 minutes   Totals: 9 9      Laie Birth Information: 38 y o  female MRN: [id number] Unit/Bed#: -01 Estimated Date of Delivery: 21  Birthweight: No birth weight on file  Gestational Age: <None> Delivery Type: , Low Transverse          APGARS  One minute Five minutes Ten minutes   Totals:               IMPORTANT INFORMATION:  Please contact the Andi Rodríguez directly with any questions or concerns regarding this request  Department voicemails are confidential     Send requests for admission clinical reviews, concurrent reviews, approvals, and administrative denials due to lack of clinical to fax [phone]

## 2021-05-24 NOTE — CASE MANAGEMENT
Breast pump consult; [place] order placed with Nancyump for [employer]meda pump for room delivery  No additional needs noted

## 2021-05-24 NOTE — PROGRESS NOTES
Progress Note - OB/GYN   Latoya Lu 38 y o  female MRN: [id number]  Unit/Bed#: -01 Encounter: [id number]      Assessment:  Post operative day #1 s/p  RLTCS + BS, stable, and doing well    Plan:  1  Hemodynamically stable   - Pre-op Hb 10 8 --> post-op Hb 9 3   - Vitals WNL, currently asymptomatic  2  [place] catheter   - Removed this am   - F/u voiding trial  3  Continue routine post partum care   - Encourage ambulation   - Encourage breastfeeding  4  Continue current meds   - See list below   - Pain adequately controlled with PO analgesics  5  gHTN   - PreE labs wnl   - -130/57-69  6  Disposition   - Stable   - Anticipate discharge home POD3    Subjective/Objective     Subjective:     Pain: yes  Tolerating Oral Intake: yes  Voiding: yes  Flatus: yes  Bowel Movement: no  Ambulating: no  Breastfeeding: Breastfeeding  Chest Pain: no  Shortness of Breath: no  Leg Pain/Discomfort: no    Objective:   Vitals:   /58 (BP Location: Right arm)   Pulse 71   Temp 98 7 °F (37 1 °C) (Oral)   Resp 18   Ht 5' 7" (1 702 m)   Wt 99 8 kg (220 lb)   LMP 08/26/2020   SpO2 97%   Breastfeeding Yes   BMI 34 46 kg/m²   Body mass index is 34 46 kg/m²    I/O       05/22 0701 - 05/23 0700 05/23 0701 - 05/24 0700    I V  (mL/kg) 950 972 9 (9 7)    IV Piggyback  1000    Total Intake(mL/kg) 950 1972 9 (19 8)    Urine (mL/kg/hr) 100 1175 (0 5)    Blood 604     Total Output 704 1175    Net +246 +797 9              Lab Results   Component Value Date    WBC 12 66 (H) 05/23/2021    HGB 9 3 (L) 05/23/2021    HCT 28 9 (L) 05/23/2021    MCV 78 (L) 05/23/2021     05/23/2021       Meds/Allergies     Physical Exam:  General: in no apparent distress  Cardiovascular: Cor RRR  Lungs: clear to auscultation bilaterally  Abdomen: abdomen is soft without significant tenderness, masses, organomegaly or guarding; incision c/d/i closed with running absorbable suture  Fundus: Firm, at the level of the umbilicus  Lower extremeties: nontender, SCDs in place bilaterally    Labs/Tests:   Recent Results (from the past 24 hour(s))   CBC    Collection Time: 05/23/21 12:32 PM   Result Value Ref Range    WBC 12 66 (H) 4 31 - 10 16 Thousand/uL    RBC 3 72 (L) 3 81 - 5 12 Million/uL    Hemoglobin 9 3 (L) 11 5 - 15 4 g/dL    Hematocrit 28 9 (L) 34 8 - 46 1 %    MCV 78 (L) 82 - 98 fL    MCH 25 0 (L) 26 8 - 34 3 pg    MCHC 32 2 31 4 - 37 4 g/dL    RDW 14 4 11 6 - 15 1 %    Platelets 480 971 - 277 Thousands/uL    MPV 12 1 8 9 - 12 7 fL       MEDS:   Current Facility-Administered Medications   Medication Dose Route Frequency    acetaminophen (TYLENOL) tablet 650 mg  650 mg Oral Q6H PRN    calcium carbonate (TUMS) chewable tablet 1,000 mg  1,000 mg Oral Daily PRN    diphenhydrAMINE (BENADRYL) injection 25 mg  25 mg Intravenous Q6H PRN    docusate sodium (COLACE) capsule 100 mg  100 mg Oral BID    HYDROmorphone (DILAUDID) injection 1 mg  1 mg Intravenous Q2H PRN    [START ON 5/25/2021] ibuprofen (MOTRIN) tablet 600 mg  600 mg Oral Q6H PRN    ketorolac (TORADOL) injection 30 mg  30 mg Intravenous Q6H PRN    lactated ringers infusion  125 mL/hr Intravenous Continuous    nalbuphine (NUBAIN) injection 5 mg  5 mg Intravenous Q3H PRN    ondansetron (ZOFRAN) injection 4 mg  4 mg Intravenous Q8H PRN    oxyCODONE (ROXICODONE) IR tablet 10 mg  10 mg Oral Q4H PRN    oxyCODONE (ROXICODONE) IR tablet 5 mg  5 mg Oral Q4H PRN     Invasive Devices     Peripheral Intravenous Line            Peripheral IV 05/23/21 Dorsal (posterior); Left Wrist 1 day                Sobia Haas MD  PGY-1 OB/GYN   5/24/2021 6:46 AM

## 2021-05-24 NOTE — PLAN OF CARE
Problem: PAIN - ADULT  Goal: Verbalizes/displays adequate comfort level or baseline comfort level  Description: Interventions:  - Encourage patient to monitor pain and request assistance  - Assess pain using appropriate pain scale  - Administer analgesics based on type and severity of pain and evaluate response  - Implement non-pharmacological measures as appropriate and evaluate response  - Consider cultural and social influences on pain and pain management  - Notify physician/advanced practitioner if interventions unsuccessful or patient reports new pain  Outcome: Progressing     Problem: INFECTION - ADULT  Goal: Absence or prevention of progression during hospitalization  Description: INTERVENTIONS:  - Assess and monitor for signs and symptoms of infection  - Monitor lab/diagnostic results  - Monitor all insertion sites, i e  indwelling lines, tubes, and drains  - Monitor endotracheal if appropriate and nasal secretions for changes in amount and color  - [place] appropriate cooling/warming therapies per order  - Administer medications as ordered  - Instruct and encourage patient and family to use good hand hygiene technique  - Identify and instruct in appropriate isolation precautions for identified infection/condition  Outcome: Progressing  Goal: Absence of fever/infection during neutropenic period  Description: INTERVENTIONS:  - Monitor WBC    Outcome: Progressing     Problem: SAFETY ADULT  Goal: Patient will remain free of falls  Description: INTERVENTIONS:  - Assess patient frequently for physical needs  -  Identify cognitive and physical deficits and behaviors that affect risk of falls    -  [place] fall precautions as indicated by assessment   - Educate patient/family on patient safety including physical limitations  - Instruct patient to call for assistance with activity based on assessment  - Modify environment to reduce risk of injury  - Consider OT/PT consult to assist with strengthening/mobility  Outcome: Progressing  Goal: Maintain or return to baseline ADL function  Description: INTERVENTIONS:  -  Assess patient's ability to carry out ADLs; assess patient's baseline for ADL function and identify physical deficits which impact ability to perform ADLs (bathing, care of mouth/teeth, toileting, grooming, dressing, etc )  - Assess/evaluate cause of self-care deficits   - Assess range of motion  - Assess patient's mobility; develop plan if impaired  - Assess patient's need for assistive devices and provide as appropriate  - Encourage maximum independence but intervene and supervise when necessary  - Involve family in performance of ADLs  - Assess for home care needs following discharge   - Consider OT consult to assist with ADL evaluation and planning for discharge  - Provide patient education as appropriate  Outcome: Progressing  Goal: Maintain or return mobility status to optimal level  Description: INTERVENTIONS:  - Assess patient's baseline mobility status (ambulation, transfers, stairs, etc )    - Identify cognitive and physical deficits and behaviors that affect mobility  - Identify mobility aids required to assist with transfers and/or ambulation (gait belt, sit-to-stand, lift, walker, cane, etc )  - [place] fall precautions as indicated by assessment  - Record patient progress and toleration of activity level on Mobility SBAR; progress patient to next Phase/Stage  - Instruct patient to call for assistance with activity based on assessment  - Consider rehabilitation consult to assist with strengthening/weightbearing, etc   Outcome: Progressing     Problem: Knowledge Deficit  Goal: Patient/family/caregiver demonstrates understanding of disease process, treatment plan, medications, and discharge instructions  Description: Complete learning assessment and assess knowledge base    Interventions:  - Provide teaching at level of understanding  - Provide teaching via preferred learning methods  Outcome: Progressing     Problem: DISCHARGE PLANNING  Goal: Discharge to home or other facility with appropriate resources  Description: INTERVENTIONS:  - Identify barriers to discharge w/patient and caregiver  - Arrange for needed discharge resources and transportation as appropriate  - Identify discharge learning needs (meds, wound care, etc )  - Arrange for interpretive services to assist at discharge as needed  - Refer to Case Management Department for coordinating discharge planning if the patient needs post-hospital services based on physician/advanced practitioner order or complex needs related to functional status, cognitive ability, or social support system  Outcome: Progressing

## 2021-05-25 VITALS
RESPIRATION RATE: 18 BRPM | SYSTOLIC BLOOD PRESSURE: 128 MMHG | HEIGHT: 67 IN | DIASTOLIC BLOOD PRESSURE: 79 MMHG | HEART RATE: 73 BPM | OXYGEN SATURATION: 97 % | WEIGHT: 220 LBS | TEMPERATURE: 98.3 F | BODY MASS INDEX: 34.53 KG/M2

## 2021-05-25 PROCEDURE — 99024 POSTOP FOLLOW-UP VISIT: CPT | Performed by: OBSTETRICS & GYNECOLOGY

## 2021-05-25 RX ADMIN — DOCUSATE SODIUM 100 MG: 100 CAPSULE, LIQUID FILLED ORAL at 07:48

## 2021-05-25 RX ADMIN — ACETAMINOPHEN 650 MG: 325 TABLET, FILM COATED ORAL at 07:48

## 2021-05-25 RX ADMIN — ACETAMINOPHEN 650 MG: 325 TABLET, FILM COATED ORAL at 01:05

## 2021-05-25 NOTE — PLAN OF CARE
Problem: PAIN - ADULT  Goal: Verbalizes/displays adequate comfort level or baseline comfort level  Description: Interventions:  - Encourage patient to monitor pain and request assistance  - Assess pain using appropriate pain scale  - Administer analgesics based on type and severity of pain and evaluate response  - Implement non-pharmacological measures as appropriate and evaluate response  - Consider cultural and social influences on pain and pain management  - Notify physician/advanced practitioner if interventions unsuccessful or patient reports new pain  Outcome: Progressing     Problem: INFECTION - ADULT  Goal: Absence or prevention of progression during hospitalization  Description: INTERVENTIONS:  - Assess and monitor for signs and symptoms of infection  - Monitor lab/diagnostic results  - Monitor all insertion sites, i e  indwelling lines, tubes, and drains  - Monitor endotracheal if appropriate and nasal secretions for changes in amount and color  - Echo appropriate cooling/warming therapies per order  - Administer medications as ordered  - Instruct and encourage patient and family to use good hand hygiene technique  - Identify and instruct in appropriate isolation precautions for identified infection/condition  Outcome: Progressing  Goal: Absence of fever/infection during neutropenic period  Description: INTERVENTIONS:  - Monitor WBC    Outcome: Progressing     Problem: SAFETY ADULT  Goal: Patient will remain free of falls  Description: INTERVENTIONS:  - Assess patient frequently for physical needs  -  Identify cognitive and physical deficits and behaviors that affect risk of falls    -  Echo fall precautions as indicated by assessment   - Educate patient/family on patient safety including physical limitations  - Instruct patient to call for assistance with activity based on assessment  - Modify environment to reduce risk of injury  - Consider OT/PT consult to assist with strengthening/mobility  Outcome: Progressing  Goal: Maintain or return to baseline ADL function  Description: INTERVENTIONS:  -  Assess patient's ability to carry out ADLs; assess patient's baseline for ADL function and identify physical deficits which impact ability to perform ADLs (bathing, care of mouth/teeth, toileting, grooming, dressing, etc )  - Assess/evaluate cause of self-care deficits   - Assess range of motion  - Assess patient's mobility; develop plan if impaired  - Assess patient's need for assistive devices and provide as appropriate  - Encourage maximum independence but intervene and supervise when necessary  - Involve family in performance of ADLs  - Assess for home care needs following discharge   - Consider OT consult to assist with ADL evaluation and planning for discharge  - Provide patient education as appropriate  Outcome: Progressing  Goal: Maintain or return mobility status to optimal level  Description: INTERVENTIONS:  - Assess patient's baseline mobility status (ambulation, transfers, stairs, etc )    - Identify cognitive and physical deficits and behaviors that affect mobility  - Identify mobility aids required to assist with transfers and/or ambulation (gait belt, sit-to-stand, lift, walker, cane, etc )  - [place] fall precautions as indicated by assessment  - Record patient progress and toleration of activity level on Mobility SBAR; progress patient to next Phase/Stage  - Instruct patient to call for assistance with activity based on assessment  - Consider rehabilitation consult to assist with strengthening/weightbearing, etc   Outcome: Progressing     Problem: Knowledge Deficit  Goal: Patient/family/caregiver demonstrates understanding of disease process, treatment plan, medications, and discharge instructions  Description: Complete learning assessment and assess knowledge base    Interventions:  - Provide teaching at level of understanding  - Provide teaching via preferred learning methods  Outcome: Progressing     Problem: DISCHARGE PLANNING  Goal: Discharge to home or other facility with appropriate resources  Description: INTERVENTIONS:  - Identify barriers to discharge w/patient and caregiver  - Arrange for needed discharge resources and transportation as appropriate  - Identify discharge learning needs (meds, wound care, etc )  - Arrange for interpretive services to assist at discharge as needed  - Refer to Case Management Department for coordinating discharge planning if the patient needs post-hospital services based on physician/advanced practitioner order or complex needs related to functional status, cognitive ability, or social support system  Outcome: Progressing

## 2021-05-25 NOTE — PROGRESS NOTES
Progress Note - OB/GYN   Dylon Connolly 38 y o  female MRN: [id number]  Unit/Bed#: -01 Encounter: [id number]      Assessment:  Post operative day #2 s/p  RLTCS + BS, stable, and doing well    Plan:  1  Hemodynamically stable   - Pre-op Hb 10 8 --> post-op Hb 9 3   - Vitals WNL, currently asymptomatic  2  Ballard catheter   - Removed this am   - F/u voiding trial  3  Continue routine post partum care   - Encourage ambulation   - Encourage breastfeeding  4  Continue current meds   - See list below   - Pain adequately controlled with PO analgesics  5  gHTN   - PreE labs wnl   - -128/73-78 overnight  6  Disposition   - Stable   - Anticipate discharge home POD3    Subjective/Objective     Subjective:     Pain: yes  Tolerating Oral Intake: yes  Voiding: yes  Flatus: yes  Bowel Movement: yes  Ambulating: no  Breastfeeding: Breastfeeding  Chest Pain: no  Shortness of Breath: no  Leg Pain/Discomfort: no    Objective:   Vitals:   /73 (BP Location: Right arm)   Pulse 73   Temp 98 3 °F (36 8 °C) (Oral)   Resp 18   Ht 5' 7" (1 702 m)   Wt 99 8 kg (220 lb)   LMP 08/26/2020   SpO2 97%   Breastfeeding Yes   BMI 34 46 kg/m²   Body mass index is 34 46 kg/m²    I/O       05/22 0701 - 05/23 0700 05/23 0701 - 05/24 0700    I V  (mL/kg) 950 972 9 (9 7)    IV Piggyback  1000    Total Intake(mL/kg) 950 1972 9 (19 8)    Urine (mL/kg/hr) 100 1175 (0 5)    Blood 604     Total Output 704 1175    Net +246 +797 9              Lab Results   Component Value Date    WBC 12 66 (H) 05/23/2021    HGB 9 3 (L) 05/23/2021    HCT 28 9 (L) 05/23/2021    MCV 78 (L) 05/23/2021     05/23/2021       Meds/Allergies     Physical Exam:  General: in no apparent distress  Cardiovascular: Cor RRR  Lungs: clear to auscultation bilaterally  Abdomen: abdomen is soft without significant tenderness, masses, organomegaly or guarding; incision c/d/i closed with running absorbable suture  Fundus: Firm, 3 cm below the umbilicus  Lower extremeties: nontender, SCDs in place bilaterally    Labs/Tests:   Recent Results (from the past 24 hour(s))   Home Grade Breast Pump    Collection Time: 05/24/21  9:34 AM   Result Value Ref Range    Supplier Name [employer] Phone Number [phone]     Order Status Scheduling Delivery     Delivery Note      Delivery Request Date 05/24/2021     Item Description Home grade breast pump          MEDS:   Current Facility-Administered Medications   Medication Dose Route Frequency    acetaminophen (TYLENOL) tablet 650 mg  650 mg Oral Q6H PRN    calcium carbonate (TUMS) chewable tablet 1,000 mg  1,000 mg Oral Daily PRN    diphenhydrAMINE (BENADRYL) injection 25 mg  25 mg Intravenous Q6H PRN    docusate sodium (COLACE) capsule 100 mg  100 mg Oral BID    HYDROmorphone (DILAUDID) injection 1 mg  1 mg Intravenous Q2H PRN    ibuprofen (MOTRIN) tablet 600 mg  600 mg Oral Q6H PRN    ketorolac (TORADOL) injection 30 mg  30 mg Intravenous Q6H PRN    lactated ringers infusion  125 mL/hr Intravenous Continuous    nalbuphine (NUBAIN) injection 5 mg  5 mg Intravenous Q3H PRN    ondansetron (ZOFRAN) injection 4 mg  4 mg Intravenous Q8H PRN    oxyCODONE (ROXICODONE) IR tablet 10 mg  10 mg Oral Q4H PRN    oxyCODONE (ROXICODONE) IR tablet 5 mg  5 mg Oral Q4H PRN    simethicone (MYLICON) oral suspension 40 mg  40 mg Oral Q6H PRN     Invasive Devices     None                 Toya Abraham MD  PGY-1 OB/GYN   5/25/2021 6:59 AM

## 2021-06-01 NOTE — UTILIZATION REVIEW
Jace Quigley MD   Resident   OB/GYN   Discharge Summary      Attested   Date of Service:  2021  3:17 AM               Attested        Attestation signed by Lauren Rosado MD at 2021 4:48 PM      Standard Teaching Supervising Statement     I have reviewed the note performed and documented by the Resident  I personally performed the required components/examined the patient  I agree with the Resident's findings and plan of care with the following additions/exceptions:            Lauren Rosado MD               CS Discharge Summary - Kunal Negrete 38 y o  female MRN: [id number]     Unit/Bed#: LD PACU- Encounter: [id number]     Admission Date: 2021      Discharge Date: 21     Admitting Diagnosis:   45 weeks gestation of pregnancy  History of  section  GHTN  Obesity  AMA  Chiari Malformation  Request for permanent sterilization     Discharge Diagnosis:   Delivery via RLTCS  S/p Bilateral salpingectomy  History of  section  GHTN  Obesity  AMA  Chiari Malformation     Procedures:   repeat  section, low transverse incision  Bilateral salpingectomy     Admitting Attending: Dr Miguel Cruz  Delivery Attending: Dr Miguel Cruz  Discharge Attending: Dr Paz Osuna service: none  Consult attending: none     Hospital Course:      Elkin Joseph is a 38 y o   who was admitted in labor for repeat low transverse  section and tubal sterilization      She then underwent an uncomplicated  section delivery and bilateral salpingectomy and delivered a viable male  on  at [phone]  APGARS were 9, 9 at 1 and 5 minutes, respectively   weighed 8lb 4 5oz   was then transferred to  nursery  Patient tolerated the procedure well and was transferred to recovery in stable condition       The patient's post partum course was unremarkable    Preoperative hemoglobin was 10 8, postoperative was 9 3   Her postoperative pain was well controlled with oral analgesics  She was diagnosed with gHTN but did not have any severe range blood pressures and had normal preeclampsia labs      On day of discharge, she was ambulating and able to reasonably perform all ADLs  She was voiding and had appropriate bowel function  Pain was well controlled  She was discharged home on post-operative day #2 without complications  Patient was instructed to follow up with her OB as an outpatient and was given appropriate warnings to call provider if she develops signs of infection or uncontrolled pain       Complications:   None     Condition at discharge:   good      Provisions for Follow-Up Care:  See after visit summary for information related to follow-up care and any pertinent home health orders        Disposition:   Home     Planned Readmission:   No     Discharge Medications:   Prenatal vitamin daily for 6 months or the duration of nursing whichever is longer  Motrin 600 mg orally every 6 hours as needed for pain  Tylenol (over the counter) per bottle directions as needed for pain  Hydrocortisone cream 1% (over the counter) applied 1-2x daily to hemorrhoids as needed  Witch hazel pads for hemorrhoidal discomfort as needed        Discharge instructions :   -Do not place anything (no partner, tampons or douche) in your vagina for 6 weeks  -You may walk for exercise for the first 6 weeks then gradually return to your usual activities    -Please do not drive for 1 week if you have no stitches and for 2 weeks if you have stitches or underwent a  delivery     -You may take baths or shower per your preference    -Please look at your bust (breasts) in the mirror daily and call provider for redness or tenderness or increased warmth     - If you have had a  please look at your incision daily as well and call provider for increasing redness or steady drainage from the incision    -Please call your provider if temperature > 100 4*F or 38* C, worsening pain or a foul discharge   Mirlande Farias MD  PGY-1 OB/GYN   5/25/2021 1:38 PM                           Cosigned by: Serge Hernandez MD at 5/25/2021  4:48 PM   Revision History

## 2021-06-03 ENCOUNTER — POSTPARTUM VISIT (OUTPATIENT)
Dept: OBGYN CLINIC | Facility: CLINIC | Age: 36
End: 2021-06-03

## 2021-06-03 ENCOUNTER — IMMUNIZATIONS (OUTPATIENT)
Dept: FAMILY MEDICINE CLINIC | Facility: HOSPITAL | Age: 36
End: 2021-06-03

## 2021-06-03 VITALS
WEIGHT: 226 LBS | HEIGHT: 67 IN | BODY MASS INDEX: 35.47 KG/M2 | SYSTOLIC BLOOD PRESSURE: 120 MMHG | DIASTOLIC BLOOD PRESSURE: 80 MMHG

## 2021-06-03 DIAGNOSIS — Z23 ENCOUNTER FOR IMMUNIZATION: Primary | ICD-10-CM

## 2021-06-03 PROCEDURE — 91300 SARS-COV-2 / COVID-19 MRNA VACCINE (PFIZER-BIONTECH) 30 MCG: CPT | Performed by: PHYSICIAN ASSISTANT

## 2021-06-03 PROCEDURE — 0001A SARS-COV-2 / COVID-19 MRNA VACCINE (PFIZER-BIONTECH) 30 MCG: CPT | Performed by: PHYSICIAN ASSISTANT

## 2021-06-03 PROCEDURE — 99024 POSTOP FOLLOW-UP VISIT: CPT | Performed by: OBSTETRICS & GYNECOLOGY

## 2021-06-03 NOTE — PROGRESS NOTES
Patient presents to the office with 1 week following   The  was performed on May 23, 2021  Patient doing well  Patient voiding without any difficulties  Moving her bowels  Postpartum depression score:  1    Minimal bleeding  Patient breast-feeding and also using formula for jaundice  Physical exam:  Heart regular rate no murmurs  Chest clear bilateral breath sounds  Abdomen soft nontender  Incision healing well  No signs of inflammation  Pelvic exam deferred  Impression:  Stable status post  1 week prior  No signs of postpartum depression      Plan:  Return to office 5 weeks for pelvic exam

## 2021-06-03 NOTE — PROGRESS NOTES
The patient is here for a incision check  She had a C/S on 5/23/21  No redness, swelling or discharge at the incision  The patient has brown spotting  No breast problems  The patient is breast-feeding

## 2021-06-08 ENCOUNTER — TELEPHONE (OUTPATIENT)
Dept: OBGYN CLINIC | Facility: CLINIC | Age: 36
End: 2021-06-08

## 2021-06-30 ENCOUNTER — IMMUNIZATIONS (OUTPATIENT)
Dept: FAMILY MEDICINE CLINIC | Facility: HOSPITAL | Age: 36
End: 2021-06-30

## 2021-06-30 DIAGNOSIS — Z23 ENCOUNTER FOR IMMUNIZATION: Primary | ICD-10-CM

## 2021-06-30 PROCEDURE — 0002A SARS-COV-2 / COVID-19 MRNA VACCINE (PFIZER-BIONTECH) 30 MCG: CPT

## 2021-06-30 PROCEDURE — 91300 SARS-COV-2 / COVID-19 MRNA VACCINE (PFIZER-BIONTECH) 30 MCG: CPT

## 2021-07-01 ENCOUNTER — POSTPARTUM VISIT (OUTPATIENT)
Dept: OBGYN CLINIC | Facility: CLINIC | Age: 36
End: 2021-07-01

## 2021-07-01 VITALS
SYSTOLIC BLOOD PRESSURE: 100 MMHG | DIASTOLIC BLOOD PRESSURE: 60 MMHG | WEIGHT: 217 LBS | HEIGHT: 67 IN | BODY MASS INDEX: 34.06 KG/M2

## 2021-07-01 PROCEDURE — 99024 POSTOP FOLLOW-UP VISIT: CPT | Performed by: OBSTETRICS & GYNECOLOGY

## 2021-07-01 NOTE — PROGRESS NOTES
Patient returns to the office 6 weeks following a repeat  bilateral salpingectomy  This was performed on May 23, 2021  Patient denies any problems  Patient breast and bottle feeding  Patient denies any vaginal bleeding  Patient taking her prenatal vitamins and extra vitamin-D 3  Postpartum depression score :1  No evidence of harming her child or herself  Physical exam:  Heart regular rate no murmurs  Chest clear bilateral breath sounds  Abdomen soft nontender   incision well healed  External genitalia normal   Vagina clear  Cervix no lesions  Uterus normal in size mobile nontender  No adnexal masses  Impression:  Stable status post repeat  bilateral salpingectomy  Plan: May resume intercourse  Recommend healthy diet exercise  Continue prenatal vitamins  Return to office in 1 year

## 2021-07-01 NOTE — PROGRESS NOTES
The patient is here for a six week post-partum  She delivered on 5/23/21  The patient is breast-feeding and giving formula  The patient has no breast problems  No cramping  The patient has mild spotting and and yellowish brown discharge  No vaginal pain, itching or burning  No bladder or bowel issues

## 2021-08-18 ENCOUNTER — OFFICE VISIT (OUTPATIENT)
Dept: GASTROENTEROLOGY | Facility: AMBULARY SURGERY CENTER | Age: 36
End: 2021-08-18
Payer: COMMERCIAL

## 2021-08-18 VITALS
HEIGHT: 67 IN | SYSTOLIC BLOOD PRESSURE: 124 MMHG | WEIGHT: 223 LBS | BODY MASS INDEX: 35 KG/M2 | DIASTOLIC BLOOD PRESSURE: 80 MMHG

## 2021-08-18 DIAGNOSIS — K21.9 GASTROESOPHAGEAL REFLUX DISEASE, UNSPECIFIED WHETHER ESOPHAGITIS PRESENT: Primary | ICD-10-CM

## 2021-08-18 PROCEDURE — 99244 OFF/OP CNSLTJ NEW/EST MOD 40: CPT | Performed by: INTERNAL MEDICINE

## 2021-08-18 RX ORDER — FAMOTIDINE 40 MG/1
40 TABLET, FILM COATED ORAL 2 TIMES DAILY
Qty: 60 TABLET | Refills: 3 | Status: SHIPPED | OUTPATIENT
Start: 2021-08-18 | End: 2022-02-23

## 2021-08-18 NOTE — PROGRESS NOTES
[facility]ultation -  [facility]  Rosann Schlatter Schallock 43 y o  female MRN: [id number]  Unit/Bed#:  Encounter: [id number]        Consults    ASSESSMENT/PLAN:     1  GERD- likely aggravated in setting of hiatal hernia or decreased LES pressure  - Would recommend starting on Pepcid 40 mg b i d   - Patient was explained about the lifestyle and dietary modifications  Advised to avoid fatty foods, chocolates, caffeine, alcohol and any other triggering foods  Avoid eating for at least 3 hours before going to bed  - Check CBC and CMP  - Will schedule EGD for further evaluation      -If patient has no improvement with H2 blocker, would recommend switching to PPI  Can also consider adding Carafate as patient has had cholecystectomy and there may be component of bile reflux   - Avoid NSAIDs     -Will biopsy for celiac at the time of procedure       ______________________________________________________________________    Reason for Consult / Principal Problem: [unfilled]    HPI: Davi Quigley is a 43y o  year old female with history of carpal tunnel syndrome, prior history of C diff infection, asthma, presents for evaluation GERD  Patient reports that she has been having increased symptoms of acid reflux since her pregnancy  She reports that she had significant symptoms of acid reflux during her pregnancy however presents today as they have persisted despite giving birth 3 months ago  She reports almost daily symptoms of acid reflux with burning sensation in her esophagus and throat  She has not tried any over-the-counter medications  She reports the symptoms have been ongoing for the past 7-8 months  She denies any hematemesis, coffee-ground emesis or melena  She does report slight loose bowel movements in the morning, reports that this is been happening since starting the intake of smoothies in the morning  No hematochezia  No family history of GI malignancy    Patient has never had an EGD or colonoscopy  Review of Systems: The remainder of the review of systems was negative except for the pertinent positives noted in HPI  Historical Information   Past Medical History:   Diagnosis Date    ADHD (attention deficit hyperactivity disorder)     Asthma     sports induced    [facility]ari malformation [facility] II ([facility] [address] )     Clostridium difficile infection     CTS (carpal tunnel syndrome)     Migraine     Papanicolaou smear for cervical cancer screening 2018    neg    Varicella     childhood     Past Surgical History:   Procedure Laterality Date    ACHILLES TENDON LENGTHENING      FL  DELIVERY ONLY N/A 2016    Procedure:  SECTION ();   Surgeon: Magaly Martinez MD;  Location: [facility];  Service: Obstetrics    FL  DELIVERY ONLY N/A 2021    Procedure:  SECTION () REPEAT;  Surgeon: Magaly Martinez MD;  [facility]: [facility];  Service: Obstetrics    FL LAP,CHOLECYSTECTOMY N/A 2019    Procedure: CHOLECYSTECTOMY LAPAROSCOPIC;  Surgeon: Teofilo Disla DO;  Location: [facility];  Service: General    TUBAL LIGATION Bilateral 2021    Procedure: LIGATION/COAGULATION TUBAL;  Surgeon: Magaly Martinez MD;  [facility];  Service: Obstetrics    WISDOM TOOTH EXTRACTION       Social History   Social History     Substance and Sexual Activity   Alcohol Use Not Currently    Comment: social     Social History     Substance and Sexual Activity   Drug Use No     Social History     Tobacco Use   Smoking Status Never Smoker   Smokeless Tobacco Never Used     Family History   Problem Relation Age of Onset    Hypothyroidism Mother     Rheumatic fever Mother     Hypertension Father     Depression Father     Alzheimer's disease Father     ADD / ADHD Brother     Diabetes Maternal Grandfather     Hearing loss Paternal Grandfather     Diabetes Paternal Grandfather     Hypertension Paternal Grandfather        Meds/Allergies     (Not in a hospital admission)    No current facility-administered medications for this visit  No Known Allergies    Objective     Blood pressure 124/80, height 5' 7" (1 702 m), weight 101 kg (223 lb), currently breastfeeding  [unfilled]    PHYSICAL EXAM     GEN: well nourished, well developed, no acute distress  HEENT: anicteric, MMM, no cervical or supraclavicular lymphadenopathy  CV: RRR, no m/r/g  CHEST: CTA b/l, no WRR  ABD: +BS, soft, NT/ND, no hepatosplenomegaly  EXT: no c/c/e  SKIN: no rashes,  NEURO: aaox3    Lab Results:   No visits with results within 1 Day(s) from this visit  Latest known visit with results is:   Admission on 05/23/2021, Discharged on 05/25/2021   Component Date Value    WBC 05/23/2021 14 25*    RBC 05/23/2021 4 41     Hemoglobin 05/23/2021 10 8*    Hematocrit 05/23/2021 34 2*    MCV 05/23/2021 78*    MCH 05/23/2021 24 5*    MCHC 05/23/2021 31 6     RDW 05/23/2021 14 3     Platelets 74/78/5756 286     MPV 05/23/2021 13 2*    RPR 05/23/2021 Non-Reactive     ABO Grouping 05/23/2021 O     Rh Factor 05/23/2021 Positive     Antibody Screen 05/23/2021 Negative     Specimen Expiration Date 05/23/2021 44306330     Sodium 05/23/2021 137     Potassium 05/23/2021 4 6     Chloride 05/23/2021 105     CO2 05/23/2021 23     ANION GAP 05/23/2021 9     BUN 05/23/2021 12     Creatinine 05/23/2021 0 92     Glucose 05/23/2021 80     Calcium 05/23/2021 9 1     Corrected Calcium 05/23/2021 9 9     AST 05/23/2021 23     ALT 05/23/2021 23     Alkaline Phosphatase 05/23/2021 173*    Total Protein 05/23/2021 6 9     Albumin 05/23/2021 3 0*    Total Bilirubin 05/23/2021 0 25     eGFR 05/23/2021 80     Creatinine, Ur 05/23/2021 163 0     Protein Urine Random 05/23/2021 23     Prot/Creat Ratio, Ur 05/23/2021 0 14*    pH, Cord Josue 05/23/2021 7  356     pCO2, Cord Josue 05/23/2021 48 3*    pO2, Cord Josue 05/23/2021 15 8     HCO3, Cord Josue 05/23/2021 26 4    [facility] Exc, Cord Josue 05/23/2021 0 2*  O2 Cont, Cord Josue 2021 7 7     O2 HGB,VENOUS CORD 2021 34 5     pH, Cord Art 2021 7 269     pCO2, Cord Art 2021 60 0     pO2, Cord Art 2021 12 0     HCO3, Cord Art 2021 26 9     Base Exc, Cord Art 2021 -1 6*    O2 Content, Cord Art 2021 4 4     O2 Hgb, Arterial Cord 2021 19 0     Case Report 2021                      Value:Surgical Pathology Report                         Case: [id number]                                   Authorizing Provider:  Csatro Todd MD            Collected:           2021 0210              Ordering Location:     [facility]        Received:            2021 [address] [facility] and                                                                                  Delivery                                                                     Pathologist:           Blade Flannery MD                                                             Specimen:    Placenta, 38 4/7 weeks                                                                     Final Diagnosis 2021                      Value: This result contains rich text formatting which cannot be displayed here   Note 2021                      Value: This result contains rich text formatting which cannot be displayed here   Additional Information 2021                      Value: This result contains rich text formatting which cannot be displayed here  Douglas Juarez Description 2021                      Value: This result contains rich text formatting which cannot be displayed here      Clinical Information 2021                      Value:Gestational Age: 38 4/7  Fetal /  Indications: Gestational HTN, Repeat   Maternal Indications: Gestational HTN  Placental Indications: Gestational HTN    Case Report 2021                      Value:Surgical Pathology Report Case: [id number]                                   Authorizing Provider:  Tatiana Ragland MD            Collected:           05/23/2021 0403              Ordering Location:     [facility]        Received:            05/23/2021 0230                                     [address] [place] [address] and                                                                                  Delivery                                                                     Pathologist:           Salima Sierra MD                                                   Specimens:   A) - Fallopian Tube, Right                                                                          B) - Fallopian Tube, Left                                                                  Final Diagnosis 05/23/2021                      Value: This result contains rich text formatting which cannot be displayed here   Additional Information 05/23/2021                      Value: This result contains rich text formatting which cannot be displayed here  Hamlet Reese Description 05/23/2021                      Value: This result contains rich text formatting which cannot be displayed here   WBC 05/23/2021 12 66*    RBC 05/23/2021 3 72*    Hemoglobin 05/23/2021 9 3*    Hematocrit 05/23/2021 28 9*    MCV 05/23/2021 78*    MCH 05/23/2021 25 0*    MCHC 05/23/2021 32 2     RDW 05/23/2021 14 4     Platelets 52/22/5903 204     MPV 05/23/2021 12 1     Supplier Name 05/24/2021 [address] Supplier Phone Number 05/24/2021 [phone]     Order Status 05/24/2021 Scheduling Delivery     Delivery Request Date 05/24/2021 05/24/2021     Item Description 05/24/2021 Home grade breast pump        Imaging Studies: I have personally reviewed pertinent films in PACS                Answers for HPI/ROS submitted by the patient on 8/11/2021  Progression since onset: resolved

## 2021-08-18 NOTE — LETTER
August 18, 2021     Referral [address] [place] [id number]    Patient: Scott Teran   YOB: 1985   Date of Visit: 8/18/2021       Dear Dr Mariam Rocha:    Thank you for referring Jason Irizarry to me for evaluation  Below are my notes for this consultation  If you have questions, please do not hesitate to call me  I look forward to following your patient along with you  Sincerely,        Jaycee Rodriguez MD        CC: Enmanuel Barrios, Gene Mirza MD  8/18/2021  4:04 PM  Sign when Signing Visit  Consultation - [facility]  Sandro Lorrainejustine Negrete 43 y o  female MRN: [id number]  Unit/Bed#:  Encounter: [id number]        Consults    ASSESSMENT/PLAN:     1  GERD- likely aggravated in setting of hiatal hernia or decreased LES pressure  - Would recommend starting on Pepcid 40 mg b i d   - Patient was explained about the lifestyle and dietary modifications  Advised to avoid fatty foods, chocolates, caffeine, alcohol and any other triggering foods  Avoid eating for at least 3 hours before going to bed  - Check CBC and CMP  - Will schedule EGD for further evaluation      -If patient has no improvement with H2 blocker, would recommend switching to PPI  Can also consider adding Carafate as patient has had cholecystectomy and there may be component of bile reflux   - Avoid NSAIDs     -Will biopsy for celiac at the time of procedure       ______________________________________________________________________    Reason for Consult / Principal Problem: [unfilled]    HPI: Scott Teran is a 43y o  year old female with history of carpal tunnel syndrome, prior history of C diff infection, asthma, presents for evaluation GERD  Patient reports that she has been having increased symptoms of acid reflux since her pregnancy    She reports that she had significant symptoms of acid reflux during her pregnancy however presents today as they have persisted despite giving birth 3 months ago  She reports almost daily symptoms of acid reflux with burning sensation in her esophagus and throat  She has not tried any over-the-counter medications  She reports the symptoms have been ongoing for the past 7-8 months  She denies any hematemesis, coffee-ground emesis or melena  She does report slight loose bowel movements in the morning, reports that this is been happening since starting the intake of smoothies in the morning  No hematochezia  No family history of GI malignancy  Patient has never had an EGD or colonoscopy  Review of Systems: The remainder of the review of systems was negative except for the pertinent positives noted in HPI  Historical Information   Past Medical History:   Diagnosis Date    ADHD (attention deficit hyperactivity disorder)     Asthma     sports induced    Chiari malformation type II ([facility] [address] )     Clostridium difficile infection     CTS (carpal tunnel syndrome)     Migraine     Papanicolaou smear for cervical cancer screening 2018    neg    Varicella     childhood     Past Surgical History:   Procedure Laterality Date    ACHILLES TENDON LENGTHENING      MA  DELIVERY ONLY N/A 2016    Procedure:  SECTION ();   Surgeon: Rolly Soto MD;  [facility];  Service: Obstetrics    MA  DELIVERY ONLY N/A 2021    Procedure: Mariangel Jose Francisco () REPEAT;  Surgeon: Rolly Soto MD;  [facility];  Service: Obstetrics    MA LAP,CHOLECYSTECTOMY N/A 2019    Procedure: CHOLECYSTECTOMY LAPAROSCOPIC;  Surgeon: Zac Wong DO;  [facility];  Service: General    TUBAL LIGATION Bilateral 2021    Procedure: LIGATION/COAGULATION TUBAL;  Surgeon: Rolly Soto MD;  [facility];  Service: Obstetrics    WISDOM TOOTH EXTRACTION       Social History   Social History     Substance and Sexual Activity   Alcohol Use Not Currently    Comment: social     Social History     Substance and Sexual Activity   Drug Use No     Social History     Tobacco Use   Smoking Status Never Smoker   Smokeless Tobacco Never Used     Family History   Problem Relation Age of Onset    Hypothyroidism Mother     Rheumatic fever Mother     Hypertension Father     Depression Father     Alzheimer's disease Father     ADD / ADHD Brother     Diabetes Maternal Grandfather     Hearing loss Paternal Grandfather     Diabetes Paternal Grandfather     Hypertension Paternal Grandfather        Meds/Allergies     (Not in a hospital admission)    No current facility-administered medications for this visit  No Known Allergies    Objective     Blood pressure 124/80, height 5' 7" (1 702 m), weight 101 kg (223 lb), currently breastfeeding  [unfilled]    PHYSICAL EXAM     GEN: well nourished, well developed, no acute distress  HEENT: anicteric, MMM, no cervical or supraclavicular lymphadenopathy  CV: RRR, no m/r/g  CHEST: CTA b/l, no WRR  ABD: +BS, soft, NT/ND, no hepatosplenomegaly  EXT: no c/c/e  SKIN: no rashes,  NEURO: aaox3    Lab Results:   No visits with results within 1 Day(s) from this visit     Latest known visit with results is:   Admission on 05/23/2021, Discharged on 05/25/2021   Component Date Value    WBC 05/23/2021 14 25*    RBC 05/23/2021 4 41     Hemoglobin 05/23/2021 10 8*    Hematocrit 05/23/2021 34 2*    MCV 05/23/2021 78*    MCH 05/23/2021 24 5*    MCHC 05/23/2021 31 6     RDW 05/23/2021 14 3     Platelets 61/14/0143 286     MPV 05/23/2021 13 2*    RPR 05/23/2021 Non-Reactive     ABO Grouping 05/23/2021 O     Rh Factor 05/23/2021 Positive     Antibody Screen 05/23/2021 Negative     Specimen Expiration Date 05/23/2021 [id number]     Sodium 05/23/2021 137     Potassium 05/23/2021 4 6     Chloride 05/23/2021 105     CO2 05/23/2021 23     ANION GAP 05/23/2021 9     BUN 05/23/2021 12     Creatinine 05/23/2021 0 92     Glucose 05/23/2021 80     Calcium 05/23/2021 9 1     Corrected Calcium 05/23/2021 9 9     AST 05/23/2021 23     ALT 05/23/2021 23     Alkaline Phosphatase 05/23/2021 173*    Total Protein 05/23/2021 6 9     Albumin 05/23/2021 3 0*    Total Bilirubin 05/23/2021 0 25     eGFR 05/23/2021 80     Creatinine, Ur 05/23/2021 163 0     Protein Urine Random 05/23/2021 23     Prot/Creat Ratio, Ur 05/23/2021 0 14*    pH, Cord Josue 05/23/2021 7  356     pCO2, Cord Josue 05/23/2021 48 3*    pO2, Cord Josue 05/23/2021 15 8     HCO3, Cord Josue 05/23/2021 26 4    [facility] Exc, Cord Josue 05/23/2021 0 2*    O2 Cont, Cord Josue 05/23/2021 7 7     O2 HGB,VENOUS CORD 05/23/2021 34 5     pH, Cord Art 05/23/2021 7 269     pCO2, Cord Art 05/23/2021 60 0     pO2, Cord Art 05/23/2021 12 0     HCO3, Cord Art 05/23/2021 26 9     Base Exc, Cord Art 05/23/2021 -1 6*    O2 Content, Cord Art 05/23/2021 4 4     O2 Hgb, Arterial Cord 05/23/2021 19 0     Case Report 05/23/2021                      Value:Surgical Pathology Report                         Case: [id number]                                   Authorizing Provider:  Joellen Lewis MD            Collected:           05/23/2021 0210              Ordering Location:     [facility]        Received:            05/23/2021 [address] and                                                                                  Delivery                                                                     Pathologist:           Sydnie Castillo MD                                                             Specimen:    Placenta, 38 4/7 weeks                                                                     Final Diagnosis 05/23/2021                      Value: This result contains rich text formatting which cannot be displayed here   Note 05/23/2021                      Value: This result contains rich text formatting which cannot be displayed here      Additional Information 2021                      Value: This result contains rich text formatting which cannot be displayed here  Chuckie Rodriguez Description 2021                      Value: This result contains rich text formatting which cannot be displayed here   Clinical Information 2021                      Value:Gestational Age: 45 4/7  Fetal /  Indications: Gestational HTN, Repeat   Maternal Indications: Gestational HTN  Placental Indications: Gestational HTN    Case Report 2021                      Value:Surgical Pathology Report                         Case: [id number]                                   Authorizing Provider:  Jose Angel Meneses MD            Collected:           2021 0212              Ordering Location:     [facility]        Received:            2021 [address] [place] [address] and                                                                                  Delivery                                                                     Pathologist:           Delia Lopez MD                                                   Specimens:   A) - Fallopian Tube, Right                                                                          B) - Fallopian Tube, Left                                                                  Final Diagnosis 2021                      Value: This result contains rich text formatting which cannot be displayed here   Additional Information 2021                      Value: This result contains rich text formatting which cannot be displayed here  Chuckie Montenegro 2021                      Value: This result contains rich text formatting which cannot be displayed here      WBC 2021 12 66*    RBC 2021 3 72*    Hemoglobin 2021 9 3*    Hematocrit 2021 28 9*    MCV 2021 78*    MCH 2021 25 0*    MCHC 2021 32 2     RDW 2021 14 4  Platelets 17/57/3199 204     MPV 05/23/2021 12 1     Supplier Name 05/24/2021 [address] Supplier Phone Number 05/24/2021 [phone]     Order Status 05/24/2021 Scheduling Delivery     Delivery Request Date 05/24/2021 05/24/2021     Item Description 05/24/2021 Home grade breast pump        Imaging Studies: I have personally reviewed pertinent films in PACS                Answers for HPI/ROS submitted by the patient on 8/11/2021  Progression since onset: resolved

## 2021-08-23 ENCOUNTER — APPOINTMENT (OUTPATIENT)
Dept: LAB | Facility: AMBULARY SURGERY CENTER | Age: 36
End: 2021-08-23
Attending: INTERNAL MEDICINE
Payer: COMMERCIAL

## 2021-08-23 DIAGNOSIS — K21.9 GASTROESOPHAGEAL REFLUX DISEASE, UNSPECIFIED WHETHER ESOPHAGITIS PRESENT: ICD-10-CM

## 2021-08-23 LAB
ALBUMIN SERPL BCP-MCNC: 3.7 G/DL (ref 3.5–5)
ALP SERPL-CCNC: 106 U/L (ref 46–116)
ALT SERPL W P-5'-P-CCNC: 60 U/L (ref 12–78)
ANION GAP SERPL CALCULATED.3IONS-SCNC: 4 MMOL/L (ref 4–13)
AST SERPL W P-5'-P-CCNC: 31 U/L (ref 5–45)
BILIRUB SERPL-MCNC: 0.28 MG/DL (ref 0.2–1)
BUN SERPL-MCNC: 19 MG/DL (ref 5–25)
CALCIUM SERPL-MCNC: 9 MG/DL (ref 8.3–10.1)
CHLORIDE SERPL-SCNC: 107 MMOL/L (ref 100–108)
CO2 SERPL-SCNC: 26 MMOL/L (ref 21–32)
CREAT SERPL-MCNC: 0.91 MG/DL (ref 0.6–1.3)
ERYTHROCYTE [DISTWIDTH] IN BLOOD BY AUTOMATED COUNT: 17.4 % (ref 11.6–15.1)
GFR SERPL CREATININE-BSD FRML MDRD: 80 ML/MIN/1.73SQ M
GLUCOSE P FAST SERPL-MCNC: 75 MG/DL (ref 65–99)
HCT VFR BLD AUTO: 39.3 % (ref 34.8–46.1)
HGB BLD-MCNC: 12.2 G/DL (ref 11.5–15.4)
MCH RBC QN AUTO: 24.6 PG (ref 26.8–34.3)
MCHC RBC AUTO-ENTMCNC: 31 G/DL (ref 31.4–37.4)
MCV RBC AUTO: 79 FL (ref 82–98)
PLATELET # BLD AUTO: 270 THOUSANDS/UL (ref 149–390)
PMV BLD AUTO: 11.4 FL (ref 8.9–12.7)
POTASSIUM SERPL-SCNC: 4.1 MMOL/L (ref 3.5–5.3)
PROT SERPL-MCNC: 7.7 G/DL (ref 6.4–8.2)
RBC # BLD AUTO: 4.96 MILLION/UL (ref 3.81–5.12)
SODIUM SERPL-SCNC: 137 MMOL/L (ref 136–145)
WBC # BLD AUTO: 6.55 THOUSAND/UL (ref 4.31–10.16)

## 2021-08-23 PROCEDURE — 80053 COMPREHEN METABOLIC PANEL: CPT

## 2021-08-23 PROCEDURE — 36415 COLL VENOUS BLD VENIPUNCTURE: CPT

## 2021-08-23 PROCEDURE — 85027 COMPLETE CBC AUTOMATED: CPT

## 2021-08-25 ENCOUNTER — APPOINTMENT (OUTPATIENT)
Dept: LAB | Facility: AMBULARY SURGERY CENTER | Age: 36
End: 2021-08-25
Payer: COMMERCIAL

## 2021-08-25 DIAGNOSIS — Z00.8 HEALTH EXAMINATION IN POPULATION SURVEY: ICD-10-CM

## 2021-08-25 LAB
CHOLEST SERPL-MCNC: 183 MG/DL (ref 50–200)
EST. AVERAGE GLUCOSE BLD GHB EST-MCNC: 103 MG/DL
HBA1C MFR BLD: 5.2 %
HDLC SERPL-MCNC: 80 MG/DL
LDLC SERPL CALC-MCNC: 79 MG/DL (ref 0–100)
NONHDLC SERPL-MCNC: 103 MG/DL
TRIGL SERPL-MCNC: 120 MG/DL

## 2021-08-25 PROCEDURE — 83036 HEMOGLOBIN GLYCOSYLATED A1C: CPT

## 2021-08-25 PROCEDURE — 80061 LIPID PANEL: CPT

## 2021-08-25 PROCEDURE — 36415 COLL VENOUS BLD VENIPUNCTURE: CPT

## 2021-08-30 ENCOUNTER — OFFICE VISIT (OUTPATIENT)
Dept: PAIN MEDICINE | Facility: CLINIC | Age: 36
End: 2021-08-30
Payer: COMMERCIAL

## 2021-08-30 VITALS
BODY MASS INDEX: 34.69 KG/M2 | HEART RATE: 80 BPM | SYSTOLIC BLOOD PRESSURE: 106 MMHG | HEIGHT: 67 IN | WEIGHT: 221 LBS | DIASTOLIC BLOOD PRESSURE: 75 MMHG

## 2021-08-30 DIAGNOSIS — R51.9 OCCIPITAL HEADACHE: ICD-10-CM

## 2021-08-30 DIAGNOSIS — M54.81 BILATERAL OCCIPITAL NEURALGIA: Primary | ICD-10-CM

## 2021-08-30 PROCEDURE — 99214 OFFICE O/P EST MOD 30 MIN: CPT | Performed by: NURSE PRACTITIONER

## 2021-08-30 NOTE — PROGRESS NOTES
Assessment:  1  Bilateral occipital neuralgia    2  Occipital headache        Plan:  1  I will schedule the patient for repeat bilateral occipital nerve blocks to address the inflammatory component of the patient's pain  Complete risks and benefits including bleeding, infection, tissue reaction, nerve injury and allergic reaction were discussed  The patient was agreeable and verbalized an understanding  2  Patient may continue Tylenol p r n  and should not exceed more than 3000 mg in 24 hours   3  The patient will follow up pending results of her procedure     [employer] software was used to dictate this note  It may contain errors with dictating incorrect words or incorrect spelling  Please contact the provider directly with any questions  History of Present Illness: The patient is a 43 y o  female  With a history of occipital neuralgia and migraines last seen on 11/11/20 who presents for a follow up office visit  The patient has had occipital nerve blocks in February 2020 which provided relief for up to a year  At that point, she did call our office to schedule repeat blocks however she was currently pregnant  She is now 3 months postpartum  She still complains of occipital pain that radiates up the back of the head terminating around/above the ear  She has been taking tylenol only as she is breast feeding  She has had EMG of the bilateral upper extremities in the past as she does have occasional symptoms radiate into the upper extremities which was unremarkable  MRI of the cervical spine revealed a small disc bulge at C5-6 without any central or foraminal stenosis or compressive pathology    The patient rates her pain as 6/10 on the numeric pain rating scale  She states her pain is intermittent in nature and bothersome the morning    She characterizes the pain as pressure like and pins and needles    I have personally reviewed and/or updated the patient's past medical history, past surgical history, family history, social history, current medications, allergies, and vital signs today  Review of Systems:    Review of Systems   Respiratory: Negative for shortness of breath  Cardiovascular: Negative for chest pain  Gastrointestinal: Negative for constipation, diarrhea, nausea and vomiting  Musculoskeletal: Negative for arthralgias, gait problem, joint swelling and myalgias  Skin: Negative for rash  Neurological: Negative for dizziness, seizures and weakness  All other systems reviewed and are negative  Past Medical History:   Diagnosis Date    ADHD (attention deficit hyperactivity disorder)     Asthma     sports induced    [facility]ari malformation [facility] II ([facility] [address]     Clostridium difficile infection     CTS (carpal tunnel syndrome)     Migraine     Papanicolaou smear for cervical cancer screening 2018    neg    Varicella     childhood       Past Surgical History:   Procedure Laterality Date    ACHILLES TENDON LENGTHENING      IN  DELIVERY ONLY N/A 2016    Procedure:  SECTION ();   Surgeon: Hali Hyatt MD;  [facility];  Service: Obstetrics    IN  DELIVERY ONLY N/A 2021    Procedure: Smith Catena () REPEAT;  Surgeon: Hali Hyatt MD;  [facility];  Service: Obstetrics    IN LAP,CHOLECYSTECTOMY N/A 2019    Procedure: CHOLECYSTECTOMY LAPAROSCOPIC;  Surgeon: Radha Yin DO;  [facility];  Service: General    TUBAL LIGATION Bilateral 2021    Procedure: LIGATION/COAGULATION TUBAL;  Surgeon: Hali Hyatt MD;  [facility];  Service: Obstetrics    WISDOM TOOTH EXTRACTION         Family History   Problem Relation Age of Onset    Hypothyroidism Mother     Rheumatic fever Mother     Hypertension Father     Depression Father     Alzheimer's disease Father     ADD / ADHD Brother     Diabetes Maternal Grandfather     Hearing loss Paternal Grandfather     Diabetes Paternal Grandfather     Hypertension Paternal Grandfather        Social History     Occupational History    Not on file   Tobacco Use    Smoking status: Never Smoker    Smokeless tobacco: Never Used   Vaping Use    Vaping Use: Never used   Substance and Sexual Activity    Alcohol use: Not Currently     Comment: social    Drug use: No    Sexual activity: Yes     Partners: Male     Birth control/protection: OCP         Current Outpatient Medications:     Butalbital-APAP-Caffeine (Fioricet) -40 MG CAPS, Take 1 tablet by mouth 2 (two) times a day, Disp: 30 capsule, Rfl: 1    Cholecalciferol 25 MCG (1000 UT) CHEW, Chew 1 tablet daily 5000 iu, Disp: , Rfl:     clotrimazole-betamethasone (LOTRISONE) 1-0 05 % cream, Apply topically 2 (two) times a day, Disp: 45 g, Rfl: 3    famotidine (PEPCID) 40 MG tablet, Take 1 tablet (40 mg total) by mouth 2 (two) times a day, Disp: 60 tablet, Rfl: 3    fluticasone-vilanterol (BREO ELLIPTA) 100-25 mcg/inh inhaler, Inhale 1 puff daily Rinse mouth after use , Disp: 2 Inhaler, Rfl: 0    Prenatal Vit-Iron Carbonyl-FA (prenatal multivitamin) TABS, Take 1 tablet by mouth daily, Disp: , Rfl:     cetirizine (ZyrTEC) 10 mg tablet, Take 10 mg by mouth daily, Disp: , Rfl:     ciclopirox (PENLAC) 8 % solution, Apply topically daily at bedtime, Disp: 6 6 mL, Rfl: 5    Doxylamine Succinate, Sleep, (UNISOM PO), Take by mouth, Disp: , Rfl:     labetalol (NORMODYNE) 100 mg tablet, Take 1 tablet (100 mg total) by mouth 2 (two) times a day (Patient not taking: Reported on 8/18/2021), Disp: 30 tablet, Rfl: 1    No Known Allergies    Physical Exam:    /75   Pulse 80   Ht 5' 7" (1 702 m)   Wt 100 kg (221 lb)   BMI 34 61 kg/m²     Constitutional:normal, well developed, well nourished, alert, in no distress and non-toxic and no overt pain behavior    Eyes:anicteric  HEENT:grossly intact  Neck:supple, symmetric, trachea midline and no masses   Pulmonary:even and unlabored  Cardiovascular:No edema or pitting edema present  Skin:Normal without rashes or lesions and well hydrated  Psychiatric:Mood and affect appropriate  Neurologic:Cranial Nerves II-XII grossly intact  Musculoskeletal:normal gait  Full range of motion all planes of the cervical spine  Tenderness to palpation over the bilateral occipital groove      Imaging  No orders to display     MRI CERVICAL SPINE WITHOUT CONTRAST   INDICATION: Q07 00: 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 hydrocephalus   R51: Headache  COMPARISON: None  TECHNIQUE: Sagittal T1, sagittal T2, sagittal inversion recovery, axial T2, axial 2D merge   IMAGE QUALITY: Diagnostic   FINDINGS:   ALIGNMENT: Normal alignment of the cervical spine  No compression fracture  No subluxation  No scoliosis  MARROW SIGNAL: Normal marrow signal is identified within the visualized bony structures  No discrete marrow lesion  CERVICAL AND VISUALIZED THORACIC CORD: Normal signal within the visualized cord  PREVERTEBRAL AND PARASPINAL SOFT TISSUES: Normal    VISUALIZED POSTERIOR FOSSA: Cerebellar tonsils descend 4 mm below the foramen magnum without obvious crowding at foramen magnum  No evidence of cervical spinal cord syrinx  CERVICAL DISC SPACES:   C2-C3: Normal    C3-C4: Normal    C4-C5: Normal    C5-C6: Minimal annular bulge without central or foraminal narrowing  C6-C7: Normal    C7-T1: Normal    UPPER THORACIC DISC SPACES: Normal    IMPRESSION:   Cerebellar tonsils descend 4 mm below the foramen magnum without evidence of crowding at the foramen magnum  No evidence of cervical spinal cord syrinx  Minimal degenerative changes  No orders of the defined types were placed in this encounter

## 2021-09-14 ENCOUNTER — TELEMEDICINE (OUTPATIENT)
Dept: INTERNAL MEDICINE CLINIC | Facility: CLINIC | Age: 36
End: 2021-09-14
Payer: COMMERCIAL

## 2021-09-14 VITALS — WEIGHT: 218 LBS | TEMPERATURE: 98.2 F | BODY MASS INDEX: 34.21 KG/M2 | HEIGHT: 67 IN

## 2021-09-14 DIAGNOSIS — B34.9 VIRAL INFECTION, UNSPECIFIED: Primary | ICD-10-CM

## 2021-09-14 PROCEDURE — U0003 INFECTIOUS AGENT DETECTION BY NUCLEIC ACID (DNA OR RNA); SEVERE ACUTE RESPIRATORY SYNDROME CORONAVIRUS 2 (SARS-COV-2) (CORONAVIRUS DISEASE [COVID-19]), AMPLIFIED PROBE TECHNIQUE, MAKING USE OF HIGH THROUGHPUT TECHNOLOGIES AS DESCRIBED BY CMS-2020-01-R: HCPCS | Performed by: NURSE PRACTITIONER

## 2021-09-14 PROCEDURE — 99213 OFFICE O/P EST LOW 20 MIN: CPT | Performed by: NURSE PRACTITIONER

## 2021-09-14 PROCEDURE — U0005 INFEC AGEN DETEC AMPLI PROBE: HCPCS | Performed by: NURSE PRACTITIONER

## 2021-09-14 NOTE — PROGRESS NOTES
COVID-19 Outpatient Progress Note    Assessment/Plan:    Problem List Items Addressed This Visit     None      Visit Diagnoses     Viral infection, unspecified    -  Primary    Relevant Orders    Novel Coronavirus (Covid-19),PCR [facility] - Collected at Mobile Vans or [facility]         Disposition:     I referred patient to one of our centralized sites for a COVID-19 swab  Will test for COVID  Suggest Mucinex and flonase, but check with child's pediatrician for approval, given that she is breastfeeding  Quarantine measures reviewed  Reviewed red flag signs to call the office with or go to the Emergency Department with  Patient verbalizes understanding  I have spent 12 minutes directly with the patient  Greater than 50% of this time was spent in counseling/coordination of care regarding: prognosis, risks and benefits of treatment options, instructions for management, patient and family education and importance of treatment compliance  Verification of patient location:    Patient is located in the following state in which I hold an active license [place]    Encounter provider AKSHAT Agrawal    Provider located at [address] [place] [id number]    Recent Visits  No visits were found meeting these conditions  Showing recent visits within past 7 days and meeting all other requirements  Today's Visits  Date Type Provider Dept   09/14/21 Telemedicine AKSHAT [facility]   Showing today's visits and meeting all other requirements  Future Appointments  No visits were found meeting these conditions  Showing future appointments within next 150 days and meeting all other requirements     This virtual check-in was done via [employer] and patient was informed that this is a secure, HIPAA-compliant platform  She agrees to proceed      Patient agrees to participate in a virtual check in via telephone or video visit instead of presenting to the office to address urgent/immediate medical needs  Patient is aware this is a billable service  After connecting through [facility], the patient was identified by name and date of birth  Davi Quigley was informed that this was a telemedicine visit and that the exam was being conducted confidentially over secure lines  My office door was closed  No one else was in the room  Davi Quigley acknowledged consent and understanding of privacy and security of the telemedicine visit  I informed the patient that I have reviewed her record in Epic and presented the opportunity for her to ask any questions regarding the visit today  The patient agreed to participate  Subjective:   Davi Quigley is a 43 y o  female who is concerned about COVID-19  Patient's symptoms include fatigue, nasal congestion, rhinorrhea, sore throat and headache (has chronic headaches)  Patient denies fever, chills, anosmia, loss of taste, cough, shortness of breath, chest tightness, abdominal pain, nausea, vomiting, diarrhea and myalgias       Date of symptom onset: 9/13/2021  COVID-19 vaccination status: Fully vaccinated    Exposure:   Contact with a person who is under investigation (PUI) for or who is positive for COVID-19 within the last 14 days?: No    Hospitalized recently for fever and/or lower respiratory symptoms?: No      Currently a healthcare worker that is involved in direct patient care?: Yes      Works in a special setting where the risk of COVID-19 transmission may be high? (this may include long-term care, correctional and [facility] facilities; homeless shelters; assisted-living facilities and group homes ): No      Resident in a special setting where the risk of COVID-19 transmission may be high? (this may include long-term care, correctional and [facility] facilities; homeless shelters; assisted-living facilities and group homes ): No      She works at [address] [place] [address] as a    She is nursing her 4 month old child  She took Zyrtec last night which was somewhat helpful  She feels like her symptoms are like a head cold  She is limited by what she can take due to nursing  Lab Results   Component Value Date    SARSCOV2 Not Detected 10/06/2020     Past Medical History:   Diagnosis Date    ADHD (attention deficit hyperactivity disorder)     Asthma     sports induced    Chiari malformation type II ([address] )     Clostridium difficile infection     CTS (carpal tunnel syndrome)     Migraine     Papanicolaou smear for cervical cancer screening 2018    neg    Varicella     childhood     Past Surgical History:   Procedure Laterality Date    ACHILLES TENDON LENGTHENING      MA  DELIVERY ONLY N/A 2016    Procedure:  SECTION ();   Surgeon: Quincy Chavira MD;  Location: [facility];  Service: Obstetrics    MA  DELIVERY ONLY N/A 2021    Procedure:  SECTION () REPEAT;  Surgeon: Quincy Chavira MD;  [facility]: [facility];  Service: Obstetrics    MA LAP,CHOLECYSTECTOMY N/A 2019    Procedure: CHOLECYSTECTOMY LAPAROSCOPIC;  Surgeon: Sherrie Davey DO;  [facility];  Service: General    TUBAL LIGATION Bilateral 2021    Procedure: LIGATION/COAGULATION TUBAL;  Surgeon: Quincy Chavira MD;  Location[facility];  Service: Obstetrics    WISDOM TOOTH EXTRACTION       Current Outpatient Medications   Medication Sig Dispense Refill    Butalbital-APAP-Caffeine (Fioricet) -40 MG CAPS Take 1 tablet by mouth 2 (two) times a day 30 capsule 1    cetirizine (ZyrTEC) 10 mg tablet Take 10 mg by mouth daily      Cholecalciferol 25 MCG (1000 UT) CHEW Chew 1 tablet daily 5000 iu      ciclopirox (PENLAC) 8 % solution Apply topically daily at bedtime 6 6 mL 5    clotrimazole-betamethasone (LOTRISONE) 1-0 05 % cream Apply topically 2 (two) times a day 45 g 3    famotidine (PEPCID) 40 MG tablet Take 1 tablet (40 mg total) by mouth 2 (two) times a day 60 tablet 3    fluticasone-vilanterol (BREO ELLIPTA) 100-25 mcg/inh inhaler Inhale 1 puff daily Rinse mouth after use  2 Inhaler 0    Prenatal Vit-Iron Carbonyl-FA (prenatal multivitamin) TABS Take 1 tablet by mouth daily       No current facility-administered medications for this visit  No Known Allergies    Review of Systems   Constitutional: Positive for fatigue  Negative for chills and fever  HENT: Positive for congestion, rhinorrhea and sore throat  Respiratory: Negative for cough, chest tightness and shortness of breath  Cardiovascular: Negative for chest pain and leg swelling  Gastrointestinal: Negative for abdominal pain, diarrhea, nausea and vomiting  Musculoskeletal: Negative for myalgias  Skin: Negative for rash  Neurological: Positive for headaches (has chronic headaches)  Negative for dizziness  Objective:    Vitals:    09/14/21 1516   Temp: 98 2 °F (36 8 °C)   Weight: 98 9 kg (218 lb)   Height: 5' 7" (1 702 m)       Physical Exam  Constitutional:       General: She is not in acute distress  Appearance: She is well-developed  HENT:      Head: Normocephalic and atraumatic  Eyes:      General: No scleral icterus  Pulmonary:      Effort: Pulmonary effort is normal    Abdominal:      Palpations: Abdomen is soft  Comments: Per patient self-palpation   Musculoskeletal:      Cervical back: Normal range of motion  Skin:     Coloration: Skin is not pale  Neurological:      Mental Status: She is alert and oriented to person, place, and time  Psychiatric:         Behavior: Behavior normal          Thought Content: Thought content normal          VIRTUAL VISIT DISCLAIMER    [place] [address] verbally agrees to participate in [employer]   Pt is aware that Virtual Care Services could be limited without vital signs or the ability to perform a full hands-on physical exam  Cara Carmita Valdez understands she or the provider may request at any time to terminate the video visit and request the patient to seek care or treatment in person

## 2021-09-14 NOTE — PATIENT INSTRUCTIONS

## 2021-09-27 ENCOUNTER — TELEPHONE (OUTPATIENT)
Dept: PAIN MEDICINE | Facility: CLINIC | Age: 36
End: 2021-09-27

## 2021-11-01 ENCOUNTER — APPOINTMENT (OUTPATIENT)
Dept: RADIOLOGY | Facility: AMBULARY SURGERY CENTER | Age: 36
End: 2021-11-01
Attending: ORTHOPAEDIC SURGERY
Payer: COMMERCIAL

## 2021-11-01 ENCOUNTER — OFFICE VISIT (OUTPATIENT)
Dept: OBGYN CLINIC | Facility: CLINIC | Age: 36
End: 2021-11-01
Payer: COMMERCIAL

## 2021-11-01 VITALS
SYSTOLIC BLOOD PRESSURE: 107 MMHG | BODY MASS INDEX: 34.21 KG/M2 | HEIGHT: 67 IN | WEIGHT: 218 LBS | DIASTOLIC BLOOD PRESSURE: 72 MMHG | HEART RATE: 60 BPM

## 2021-11-01 DIAGNOSIS — M25.561 RIGHT KNEE PAIN, UNSPECIFIED CHRONICITY: ICD-10-CM

## 2021-11-01 DIAGNOSIS — Q68.2 PATELLA ALTA: ICD-10-CM

## 2021-11-01 DIAGNOSIS — M17.11 PATELLOFEMORAL ARTHRITIS OF RIGHT KNEE: Primary | ICD-10-CM

## 2021-11-01 PROCEDURE — 99203 OFFICE O/P NEW LOW 30 MIN: CPT | Performed by: ORTHOPAEDIC SURGERY

## 2021-11-01 PROCEDURE — 73562 X-RAY EXAM OF KNEE 3: CPT

## 2021-11-09 ENCOUNTER — EVALUATION (OUTPATIENT)
Dept: PHYSICAL THERAPY | Facility: CLINIC | Age: 36
End: 2021-11-09
Payer: COMMERCIAL

## 2021-11-09 DIAGNOSIS — Q68.2 PATELLA ALTA: ICD-10-CM

## 2021-11-09 DIAGNOSIS — M17.11 PATELLOFEMORAL ARTHRITIS OF RIGHT KNEE: Primary | ICD-10-CM

## 2021-11-09 PROCEDURE — 97162 PT EVAL MOD COMPLEX 30 MIN: CPT | Performed by: PHYSICAL THERAPIST

## 2021-11-09 PROCEDURE — 97110 THERAPEUTIC EXERCISES: CPT | Performed by: PHYSICAL THERAPIST

## 2021-11-16 ENCOUNTER — OFFICE VISIT (OUTPATIENT)
Dept: PHYSICAL THERAPY | Facility: CLINIC | Age: 36
End: 2021-11-16
Payer: COMMERCIAL

## 2021-11-16 DIAGNOSIS — Q68.2 PATELLA ALTA: ICD-10-CM

## 2021-11-16 DIAGNOSIS — M17.11 PATELLOFEMORAL ARTHRITIS OF RIGHT KNEE: Primary | ICD-10-CM

## 2021-11-16 PROCEDURE — 97110 THERAPEUTIC EXERCISES: CPT

## 2021-11-22 ENCOUNTER — OFFICE VISIT (OUTPATIENT)
Dept: PHYSICAL THERAPY | Facility: CLINIC | Age: 36
End: 2021-11-22
Payer: COMMERCIAL

## 2021-11-22 DIAGNOSIS — Q68.2 PATELLA ALTA: ICD-10-CM

## 2021-11-22 DIAGNOSIS — M17.11 PATELLOFEMORAL ARTHRITIS OF RIGHT KNEE: Primary | ICD-10-CM

## 2021-11-22 PROCEDURE — 97140 MANUAL THERAPY 1/> REGIONS: CPT

## 2021-11-22 PROCEDURE — 97110 THERAPEUTIC EXERCISES: CPT

## 2021-11-24 ENCOUNTER — TELEPHONE (OUTPATIENT)
Dept: GASTROENTEROLOGY | Facility: HOSPITAL | Age: 36
End: 2021-11-24

## 2021-11-24 ENCOUNTER — OFFICE VISIT (OUTPATIENT)
Dept: PHYSICAL THERAPY | Facility: CLINIC | Age: 36
End: 2021-11-24
Payer: COMMERCIAL

## 2021-11-24 DIAGNOSIS — Q68.2 PATELLA ALTA: ICD-10-CM

## 2021-11-24 DIAGNOSIS — M17.11 PATELLOFEMORAL ARTHRITIS OF RIGHT KNEE: Primary | ICD-10-CM

## 2021-11-24 PROCEDURE — 97110 THERAPEUTIC EXERCISES: CPT | Performed by: PHYSICAL THERAPIST

## 2021-11-24 PROCEDURE — 97140 MANUAL THERAPY 1/> REGIONS: CPT | Performed by: PHYSICAL THERAPIST

## 2021-11-24 PROCEDURE — 97112 NEUROMUSCULAR REEDUCATION: CPT | Performed by: PHYSICAL THERAPIST

## 2021-11-28 ENCOUNTER — ANESTHESIA (OUTPATIENT)
Dept: ANESTHESIOLOGY | Facility: HOSPITAL | Age: 36
End: 2021-11-28

## 2021-11-28 ENCOUNTER — ANESTHESIA EVENT (OUTPATIENT)
Dept: ANESTHESIOLOGY | Facility: HOSPITAL | Age: 36
End: 2021-11-28

## 2021-11-29 ENCOUNTER — ANESTHESIA EVENT (OUTPATIENT)
Dept: GASTROENTEROLOGY | Facility: HOSPITAL | Age: 36
End: 2021-11-29

## 2021-11-29 ENCOUNTER — APPOINTMENT (OUTPATIENT)
Dept: PHYSICAL THERAPY | Facility: CLINIC | Age: 36
End: 2021-11-29
Payer: COMMERCIAL

## 2021-11-29 ENCOUNTER — HOSPITAL ENCOUNTER (OUTPATIENT)
Dept: GASTROENTEROLOGY | Facility: HOSPITAL | Age: 36
Setting detail: OUTPATIENT SURGERY
Discharge: HOME | End: 2021-11-29
Attending: INTERNAL MEDICINE
Payer: COMMERCIAL

## 2021-11-29 ENCOUNTER — ANESTHESIA (OUTPATIENT)
Dept: GASTROENTEROLOGY | Facility: HOSPITAL | Age: 36
End: 2021-11-29

## 2021-11-29 VITALS
SYSTOLIC BLOOD PRESSURE: 115 MMHG | DIASTOLIC BLOOD PRESSURE: 66 MMHG | TEMPERATURE: 97.8 F | OXYGEN SATURATION: 100 % | RESPIRATION RATE: 16 BRPM | HEART RATE: 58 BPM

## 2021-11-29 DIAGNOSIS — K21.9 GASTROESOPHAGEAL REFLUX DISEASE, UNSPECIFIED WHETHER ESOPHAGITIS PRESENT: ICD-10-CM

## 2021-11-29 LAB
EXT PREGNANCY TEST URINE: NEGATIVE
EXT. CONTROL: NORMAL

## 2021-11-29 PROCEDURE — 43239 EGD BIOPSY SINGLE/MULTIPLE: CPT | Performed by: INTERNAL MEDICINE

## 2021-11-29 PROCEDURE — 88305 TISSUE EXAM BY PATHOLOGIST: CPT | Performed by: PATHOLOGY

## 2021-11-29 PROCEDURE — 81025 URINE PREGNANCY TEST: CPT | Performed by: INTERNAL MEDICINE

## 2021-11-29 RX ORDER — LIDOCAINE HYDROCHLORIDE 10 MG/ML
INJECTION, SOLUTION EPIDURAL; INFILTRATION; INTRACAUDAL; PERINEURAL AS NEEDED
Status: DISCONTINUED | OUTPATIENT
Start: 2021-11-29 | End: 2021-11-29

## 2021-11-29 RX ORDER — OMEPRAZOLE 40 MG/1
40 CAPSULE, DELAYED RELEASE ORAL DAILY
Qty: 30 CAPSULE | Refills: 3 | Status: SHIPPED | OUTPATIENT
Start: 2021-11-29 | End: 2022-04-22 | Stop reason: HOSPADM

## 2021-11-29 RX ORDER — SODIUM CHLORIDE, SODIUM LACTATE, POTASSIUM CHLORIDE, CALCIUM CHLORIDE 600; 310; 30; 20 MG/100ML; MG/100ML; MG/100ML; MG/100ML
INJECTION, SOLUTION INTRAVENOUS CONTINUOUS PRN
Status: DISCONTINUED | OUTPATIENT
Start: 2021-11-29 | End: 2021-11-29

## 2021-11-29 RX ORDER — PROPOFOL 10 MG/ML
INJECTION, EMULSION INTRAVENOUS AS NEEDED
Status: DISCONTINUED | OUTPATIENT
Start: 2021-11-29 | End: 2021-11-29

## 2021-11-29 RX ADMIN — SODIUM CHLORIDE, SODIUM LACTATE, POTASSIUM CHLORIDE, AND CALCIUM CHLORIDE: .6; .31; .03; .02 INJECTION, SOLUTION INTRAVENOUS at 08:33

## 2021-11-29 RX ADMIN — PROPOFOL 30 MG: 10 INJECTION, EMULSION INTRAVENOUS at 08:41

## 2021-11-29 RX ADMIN — PROPOFOL 120 MG: 10 INJECTION, EMULSION INTRAVENOUS at 08:37

## 2021-11-29 RX ADMIN — LIDOCAINE HYDROCHLORIDE 100 MG: 10 INJECTION, SOLUTION EPIDURAL; INFILTRATION; INTRACAUDAL at 08:37

## 2021-11-29 RX ADMIN — PROPOFOL 30 MG: 10 INJECTION, EMULSION INTRAVENOUS at 08:39

## 2021-11-30 ENCOUNTER — APPOINTMENT (OUTPATIENT)
Dept: PHYSICAL THERAPY | Facility: CLINIC | Age: 36
End: 2021-11-30
Payer: COMMERCIAL

## 2021-12-02 ENCOUNTER — OFFICE VISIT (OUTPATIENT)
Dept: PHYSICAL THERAPY | Facility: CLINIC | Age: 36
End: 2021-12-02
Payer: COMMERCIAL

## 2021-12-02 DIAGNOSIS — M17.11 PATELLOFEMORAL ARTHRITIS OF RIGHT KNEE: Primary | ICD-10-CM

## 2021-12-02 DIAGNOSIS — Q68.2 PATELLA ALTA: ICD-10-CM

## 2021-12-02 PROCEDURE — 97140 MANUAL THERAPY 1/> REGIONS: CPT

## 2021-12-02 PROCEDURE — 97110 THERAPEUTIC EXERCISES: CPT

## 2021-12-06 ENCOUNTER — APPOINTMENT (OUTPATIENT)
Dept: PHYSICAL THERAPY | Facility: CLINIC | Age: 36
End: 2021-12-06
Payer: COMMERCIAL

## 2022-01-04 ENCOUNTER — TELEPHONE (OUTPATIENT)
Dept: GASTROENTEROLOGY | Facility: AMBULARY SURGERY CENTER | Age: 37
End: 2022-01-04

## 2022-01-04 NOTE — TELEPHONE ENCOUNTER
----- Message from Brannon Ramírez sent at 12/4/2021  1:51 PM EST -----    ----- Message -----  From: Kimberly Santacruz MD  Sent: 12/3/2021   5:00 PM EST  To: [facility]    I called the patient and discussed the results with her  I informed her that duodenal biopsies are negative for celiac disease, gastric biopsies do not show any evidence of H pylori bacteria but do show gastritis  Interestingly the esophageal biopsies show increased eosinophils which are typically seen either with reflux or with eosinophilic esophagitis, I would recommend the patient take omeprazole 40 mg twice daily instead of once daily  Would also recommend repeating another EGD in about 3-4 months to assess for resolution of eosinophilia and for midesophageal biopsies

## 2022-01-04 NOTE — TELEPHONE ENCOUNTER
Patient is scheduled for EGD on April 22 , 2022 at [facility]  with Rosie Rizvi MD  Patient is aware of pre-procedure prep of NPO after MN and they will be called the day prior between 2 and 6 pm for time to report for procedure  Pre-procedure prep has been given to the patient  via [address] mail on January 4 , 2022

## 2022-01-10 ENCOUNTER — OFFICE VISIT (OUTPATIENT)
Dept: OBGYN CLINIC | Facility: CLINIC | Age: 37
End: 2022-01-10
Payer: COMMERCIAL

## 2022-01-10 VITALS — BODY MASS INDEX: 34.21 KG/M2 | WEIGHT: 218 LBS | HEIGHT: 67 IN

## 2022-01-10 DIAGNOSIS — M17.11 PATELLOFEMORAL ARTHRITIS OF RIGHT KNEE: Primary | ICD-10-CM

## 2022-01-10 PROCEDURE — 99213 OFFICE O/P EST LOW 20 MIN: CPT | Performed by: PHYSICIAN ASSISTANT

## 2022-01-10 PROCEDURE — 20610 DRAIN/INJ JOINT/BURSA W/O US: CPT | Performed by: PHYSICIAN ASSISTANT

## 2022-01-10 RX ORDER — BUPIVACAINE HYDROCHLORIDE 2.5 MG/ML
1 INJECTION, SOLUTION INFILTRATION; PERINEURAL
Status: COMPLETED | OUTPATIENT
Start: 2022-01-10 | End: 2022-01-10

## 2022-01-10 RX ORDER — BETAMETHASONE SODIUM PHOSPHATE AND BETAMETHASONE ACETATE 3; 3 MG/ML; MG/ML
12 INJECTION, SUSPENSION INTRA-ARTICULAR; INTRALESIONAL; INTRAMUSCULAR; SOFT TISSUE
Status: COMPLETED | OUTPATIENT
Start: 2022-01-10 | End: 2022-01-10

## 2022-01-10 RX ORDER — LIDOCAINE HYDROCHLORIDE 10 MG/ML
1 INJECTION, SOLUTION INFILTRATION; PERINEURAL
Status: COMPLETED | OUTPATIENT
Start: 2022-01-10 | End: 2022-01-10

## 2022-01-10 RX ADMIN — BETAMETHASONE SODIUM PHOSPHATE AND BETAMETHASONE ACETATE 12 MG: 3; 3 INJECTION, SUSPENSION INTRA-ARTICULAR; INTRALESIONAL; INTRAMUSCULAR; SOFT TISSUE at 17:28

## 2022-01-10 RX ADMIN — BUPIVACAINE HYDROCHLORIDE 1 ML: 2.5 INJECTION, SOLUTION INFILTRATION; PERINEURAL at 17:28

## 2022-01-10 RX ADMIN — LIDOCAINE HYDROCHLORIDE 1 ML: 10 INJECTION, SOLUTION INFILTRATION; PERINEURAL at 17:28

## 2022-01-10 NOTE — PROGRESS NOTES
Assessment/Plan:  1  Patellofemoral arthritis of right knee  Injection procedure prior authorization    Large joint arthrocentesis     Patient Active Problem List   Diagnosis    Back pain    Mild persistent asthma without complication    Neck pain    Cholelithiasis    Occipital headache    Cervical paraspinal muscle spasm    Chiari malformation type I ([address] )    Class 2 obesity in adult    Bilateral occipital neuralgia    Chronic migraine without aura without status migrainosus, not intractable    Paresthesia of left arm    Paresthesia of right arm    AMA (advanced maternal age) multigravida 35+    History of  delivery, antepartum    38 weeks gestation of pregnancy    History of pre-eclampsia    Gestational hypertension    S/P repeat low transverse     Status post bilateral salpingectomy    Gastroesophageal reflux disease    Patellofemoral arthritis of right knee    Patella blaine       Discussion/Summary:    43 y o  female  with right knee pain secondary to patellofemoral osteoarthritis; baker cyst     Intra-articular cortisone injection given to right knee today patient tolerated this well   She will continue with Billingsley taping   Continue with home exercise program physical therapy   Prior authorization for Visco injections ordered   Discussed the patient that if posterior knee pain and fullness continues to bother her we will order ultrasound-guided drainage of Baker cyst at this time she does not wish to have this done as it is minimally symptomatic   She will follow-up when Visco injections are ready      Subjective:   Patient ID: David Guillen is a 43 y o  female   HPI    Patient presents to the office for follow up of right knee pain  Since the last visit, the patient reports minimal improvements in her right knee pain  She did notice improvements with the Billingsley taping however this relief did not persist after taking the tape off    She had some improvement with physical therapy she stopped physical therapy in the beginning of December has been doing a home exercise program since then     Still has pain anterior knee and crepitus as well  Pain is the worse with motion stairs  She feels low bit of fullness behind the knee  No new injuries to the knee since last being seen  No paresthesias right lower extremity       The following portions of the patient's history were reviewed and updated as appropriate: allergies, current medications, past family history, past social history, past surgical history and problem list     Social History     Socioeconomic History    Marital status: /Civil Union     Spouse name: Not on file    Number of children: 1    Years of education: Not on file    Highest education level: Not on file   Occupational History    Not on file   Tobacco Use    Smoking status: Never Smoker    Smokeless tobacco: Never Used   Vaping Use    Vaping Use: Never used   Substance and Sexual Activity    Alcohol use: Not Currently     Comment: social    Drug use: No    Sexual activity: Yes     Partners: Male     Birth control/protection: OCP   Other Topics Concern    Not on file   Social History Narrative    Not on file     Social Determinants of Health     Financial Resource Strain: Not on file   Food Insecurity: Not on file   Transportation Needs: Not on file   Physical Activity: Not on file   Stress: Not on file   Social Connections: Not on file   Intimate Partner Violence: Not on file   Housing Stability: Not on file     Past Medical History:   Diagnosis Date    ADHD (attention deficit hyperactivity disorder)     Asthma     sports induced    [address] malformation type II [address] [address]     Clostridium difficile infection     CTS (carpal tunnel syndrome)     Migraine     Papanicolaou smear for cervical cancer screening 05/2018    neg    Varicella     childhood     Past Surgical History:   Procedure Laterality Date    ACHILLES TENDON LENGTHENING      MO  DELIVERY ONLY N/A 2016    Procedure:  SECTION (); Surgeon: Leobardo Morin MD;  Location: [facility];  Service: Obstetrics    MO  DELIVERY ONLY N/A 2021    Procedure:  SECTION () REPEAT;  Surgeon: Leobardo Morin MD;  [facility];  Service: Obstetrics    MO LAP,CHOLECYSTECTOMY N/A 2019    Procedure: CHOLECYSTECTOMY LAPAROSCOPIC;  Surgeon: Alfonza Nyhan, DO;  [facility];  Service: General    TUBAL LIGATION Bilateral 2021    Procedure: LIGATION/COAGULATION TUBAL;  Surgeon: Leobardo Morin MD;  [facility];  Service: Obstetrics    WISDOM TOOTH EXTRACTION       No Known Allergies  Current Outpatient Medications on File Prior to Visit   Medication Sig Dispense Refill    Butalbital-APAP-Caffeine (Fioricet) -40 MG CAPS Take 1 tablet by mouth 2 (two) times a day 30 capsule 1    cetirizine (ZyrTEC) 10 mg tablet Take 10 mg by mouth daily      Cholecalciferol 25 MCG (1000 UT) CHEW Chew 1 tablet daily 5000 iu      ciclopirox (PENLAC) 8 % solution Apply topically daily at bedtime 6 6 mL 5    clotrimazole-betamethasone (LOTRISONE) 1-0 05 % cream Apply topically 2 (two) times a day 45 g 3    famotidine (PEPCID) 40 MG tablet Take 1 tablet (40 mg total) by mouth 2 (two) times a day 60 tablet 3    fluticasone-vilanterol (BREO ELLIPTA) 100-25 mcg/inh inhaler Inhale 1 puff daily Rinse mouth after use  2 Inhaler 0    omeprazole (PriLOSEC) 40 MG capsule Take 1 capsule (40 mg total) by mouth daily 30 capsule 3    Prenatal Vit-Iron Carbonyl-FA (prenatal multivitamin) TABS Take 1 tablet by mouth daily       No current facility-administered medications on file prior to visit  Review of Systems  See HPi    Objective: There were no vitals filed for this visit  Physical Exam  Constitutional:       Appearance: She is well-developed  HENT:      Head: Normocephalic and atraumatic     Eyes:      General: No scleral icterus  Conjunctiva/sclera: Conjunctivae normal    Cardiovascular:      Comments: No discernible arrhthymias  Pulmonary:      Effort: Pulmonary effort is normal  No respiratory distress  Breath sounds: No stridor  Abdominal:      General: There is no distension  Palpations: Abdomen is soft  Musculoskeletal:      Cervical back: Neck supple  Right knee: No effusion  Skin:     General: Skin is warm and dry  Findings: No erythema  Neurological:      Mental Status: She is alert and oriented to person, place, and time  Psychiatric:         Behavior: Behavior normal          Right Knee Exam     Muscle Strength   The patient has normal right knee strength  Tenderness   The patient is experiencing tenderness in the patella  Range of Motion   The patient has normal right knee ROM  Extension: normal   Flexion: normal     Other   Erythema: absent  Scars: absent  Sensation: normal  Pulse: present  Swelling: none  Effusion: no effusion present    Comments:  Negative patellar grind  Lateral patellar facet tenderness  Crepitus in the patellofemoral joint with range of motion            I have personally reviewed pertinent films in PACS  Large joint arthrocentesis: R knee  Universal Protocol:  Consent given by: patient  Time out: Immediately prior to procedure a "time out" was called to verify the correct patient, procedure, equipment, support staff and site/side marked as required    Site marked: the operative site was marked  Supporting Documentation  Indications: pain   Procedure Details  Location: knee - R knee  Preparation: Patient was prepped and draped in the usual sterile fashion  Needle size: 22 G  Approach: anterolateral  Medications administered: 1 mL lidocaine 1 %; 12 mg betamethasone acetate-betamethasone sodium phosphate 6 (3-3) mg/mL; 1 mL bupivacaine 0 25 %    Patient tolerance: patient tolerated the procedure well with no immediate complications  Dressing:  Sterile dressing applied            Portions of the record may have been created with voice recognition software  Occasional wrong word or "sound a like" substitutions may have occurred due to the inherent limitations of voice recognition software  Read the chart carefully and recognize, using context, where substitutions have occurred

## 2022-01-10 NOTE — PATIENT INSTRUCTIONS
Intra-articular Hyaluronic Acid Injection  Intra-articular Hyaluronic Acid Injection Brands  There are several types of Intra-articular Hyaluronic Acid Injections which vary in brand, dosage, and frequency  There are single dose injections as well as a series of injections  Your provider will choose the injection brand and series based on clinical factors as well as what your insurance company prefers or covers  Buy and Bill vs  Specialty Pharmacy  Injections may be obtained in two ways:   Buy and Bill: The insurance is requiring the office to buy the injection medication and bill the patients insurance for both the injection medication and the office visit   Specialty Pharmacy: The insurance is requiring the office to order the medication from the insurances Specialty Pharmacy and the specialty pharmacy will bill the patients insurance directly for the injection medication  When a specialty pharmacy is used, the office cannot bill for the injection medication, the office can only bill for the office visit  (Examples of a specialty pharmacy include, but not limited to, [facility] , [id number] [facility], [address] [place] [address])  Time Line Expectations  Your care team will work to ensure the injection(s) being ordered for you are authorized by your insurance and is obtained by the insurance plans preferred pharmacy  Your insurance plan may dictate the brand and dosage of the series  Due to the nature of obtaining an insurance authorization as well as working with the pharmacy, the authorization process may take up to 14 business days, sometimes longer if your insurance plan denies the injection authorization or if the pharmacy has delays  If your insurance plan denies the authorization our team will submit an appeal which may lengthen the wait time for the approval of your injection      Our injection authorization team will be in contact with you throughout the injection authorization process, however, please note there may be times of silence while we wait for insurance and pharmacy updates  Your injection appointment(s) will be scheduled once our injection authorization team has received approval from your insurance plan and/or from the pharmacy  Please note: Specialty pharmacies are often delayed when obtaining authorizations and verifying benefits for injection medications  You may receive a phone call from the specialty pharmacy to authorize the medication; so it is important to accept calls from the specialty pharmacy to ensure your injections are not delayed

## 2022-01-15 ENCOUNTER — IMMUNIZATIONS (OUTPATIENT)
Dept: FAMILY MEDICINE CLINIC | Facility: HOSPITAL | Age: 37
End: 2022-01-15

## 2022-01-15 DIAGNOSIS — Z23 ENCOUNTER FOR IMMUNIZATION: Primary | ICD-10-CM

## 2022-01-15 PROCEDURE — 0001A COVID-19 PFIZER VACC 0.3 ML: CPT

## 2022-01-15 PROCEDURE — 91300 COVID-19 PFIZER VACC 0.3 ML: CPT

## 2022-02-11 ENCOUNTER — OFFICE VISIT (OUTPATIENT)
Dept: PAIN MEDICINE | Facility: CLINIC | Age: 37
End: 2022-02-11
Payer: COMMERCIAL

## 2022-02-11 VITALS
HEART RATE: 70 BPM | HEIGHT: 67 IN | SYSTOLIC BLOOD PRESSURE: 114 MMHG | BODY MASS INDEX: 36.26 KG/M2 | WEIGHT: 231 LBS | DIASTOLIC BLOOD PRESSURE: 66 MMHG

## 2022-02-11 DIAGNOSIS — M54.81 BILATERAL OCCIPITAL NEURALGIA: Primary | ICD-10-CM

## 2022-02-11 PROCEDURE — 99214 OFFICE O/P EST MOD 30 MIN: CPT | Performed by: NURSE PRACTITIONER

## 2022-02-11 RX ORDER — GABAPENTIN 300 MG/1
CAPSULE ORAL
Qty: 90 CAPSULE | Refills: 0 | Status: SHIPPED | OUTPATIENT
Start: 2022-02-11 | End: 2022-04-22 | Stop reason: ALTCHOICE

## 2022-02-11 NOTE — PATIENT INSTRUCTIONS
Gabapentin (By mouth)   Gabapentin (dloly-a-PEN-tin)  Treats seizures and pain caused by shingles  Brand Name(s): FusePaq Fanatrex, Neurontin   There may be other brand names for this medicine  When This Medicine Should Not Be Used: This medicine is not right for everyone  Do not use it if you had an allergic reaction to gabapentin  How to Use This Medicine:   Capsule, Liquid, Tablet  · Take your medicine as directed  Your dose may need to be changed several times to find what works best for you  If you have epilepsy, do not allow more than 12 hours to pass between doses  · Capsule: Swallow the capsule whole with plenty of water  Do not open, crush, or chew it  · Gralise® tablet: Swallow the tablet whole   Do not crush, break, or chew it  · Neurontin® tablet: If you break a tablet into 2 pieces, use the second half as your next dose  Do not use the half-tablet if the whole tablet has been cut or broken after 28 days  · Oral liquid: Measure the oral liquid medicine with a marked measuring spoon, oral syringe, or medicine cup  · This medicine should come with a Medication Guide  Ask your pharmacist for a copy if you do not have one  · Missed dose: Take a dose as soon as you remember  If it is almost time for your next dose, wait until then and take a regular dose  Do not take extra medicine to make up for a missed dose  · Store the medicine in a closed container at room temperature, away from heat, moisture, and direct light  Store the Neurontin® oral liquid in the refrigerator  Do not freeze  Drugs and Foods to Avoid:   Ask your doctor or pharmacist before using any other medicine, including over-the-counter medicines, vitamins, and herbal products  · Some medicines can affect how gabapentin works  Tell your doctor if you also using hydrocodone or morphine  · If you take an antacid, wait at least 2 hours before you take gabapentin  · Do not drink alcohol while you are using this medicine    · Tell your doctor if you use anything else that makes you sleepy  Some examples are allergy medicine, narcotic pain medicine, and alcohol  Tell your doctor if you are also using lorazepam, oxycodone, or zolpidem  Warnings While Using This Medicine:   · Tell your doctor if you are pregnant or breastfeeding, or if you have kidney problems (including patients receiving dialysis) or lung problems  Tell your doctor if you have a history of depression or mental health problems  · This medicine may cause the following problems:  ? Drug reaction with eosinophilia and systemic symptoms (DRESS) or multiorgan hypersensitivity, which may damage the liver, kidney, blood, heart, or muscles  ? Changes in mood or behavior, including suicidal thoughts or behavior  ? Respiratory depression (serious breathing problem that can be life-threatening), when used with narcotic pain medicines  · Do not stop using this medicine suddenly  Your doctor will need to slowly decrease your dose before you stop it completely  · This medicine may make you dizzy or drowsy  Do not drive or do anything else that could be dangerous until you know how this medicine affects you  · Tell any doctor or dentist who treats you that you are using this medicine  This medicine may affect certain medical test results  · Your doctor will check your progress and the effects of this medicine at regular visits  Keep all appointments  · Keep all medicine out of the reach of children  Never share your medicine with anyone    Possible Side Effects While Using This Medicine:   Call your doctor right away if you notice any of these side effects:  · Allergic reaction: Itching or hives, swelling in your face or hands, swelling or tingling in your mouth or throat, chest tightness, trouble breathing  · Behavior problems, aggression, restlessness, trouble concentrating, moodiness (especially in children)  · Blistering, peeling, red skin rash  · Blue lips, fingernails, or skin, chest pain, fast heartbeat, trouble breathing  · Change in how much or how often you urinate, bloody or cloudy urine  · Dark urine or pale stools, nausea, vomiting, loss of appetite, stomach pain, yellow skin or eyes  · Fever, chills, cough, sore throat, body aches  · Problems with coordination, shakiness, unsteadiness, unusual eye movement  · Rapid weight gain, swelling in your hands, ankles, or feet  · Rash, swollen or tender glands in the neck, armpit, or groin  · Unusual moods or behaviors, thoughts of hurting yourself, feeling depressed  If you notice these less serious side effects, talk with your doctor:   · Dizziness, drowsiness, sleepiness, tiredness  If you notice other side effects that you think are caused by this medicine, tell your doctor  Call your doctor for medical advice about side effects  You may report side effects to FDA at 9-849-FDA-6406    © Copyright [employer] [facility] 2021 Information is for End User's use only and may not be sold, redistributed or otherwise used for commercial purposes  The above information is an  only  It is not intended as medical advice for individual conditions or treatments  Talk to your doctor, nurse or pharmacist before following any medical regimen to see if it is safe and effective for you

## 2022-02-11 NOTE — PROGRESS NOTES
Assessment:  1  Bilateral occipital neuralgia        Plan:  1  I will schedule the patient for bilateral occipital nerve blocks to address the inflammatory component of the patient's pain  She did receive % relief of her occipital neuralgia from previous blocks that lasted about a year  2  I will initiate gabapentin 300 mg q h s  and titrate up to t i d  dosing for neuropathic complaints  Patient was educated on medications most common side effects which include dizziness, drowsiness, and swelling of the hands and feet  Patient was instructed to call the office with any adverse medication side effects  The patient is also aware that if they would like to come off of the medication, they need to let us know as suddenly stopping the medication puts them at risk to have a seizure  3  Patient will follow-up after her procedure or sooner if needed    M*Modal software was used to dictate this note  It may contain errors with dictating incorrect words or incorrect spelling  Please contact the provider directly with any questions  History of Present Illness: The patient is a 43 y o  female with a history of occipital neuralgia last seen on 8/30/21 who presents for a follow up office visit  Patient has had occipital nerve blocks the past which provided % relief of her pain for about a year  She would like to repeat them at this point  She rates her pain a 5/10 on the numeric pain rating scale  She intermittently has pain in the morning which is described as throbbing and pressure-like    I have personally reviewed and/or updated the patient's past medical history, past surgical history, family history, social history, current medications, allergies, and vital signs today  Review of Systems:    Review of Systems   Respiratory: Negative for shortness of breath  Cardiovascular: Negative for chest pain  Gastrointestinal: Negative for constipation, diarrhea, nausea and vomiting  Musculoskeletal: Negative for arthralgias, gait problem, joint swelling and myalgias  Skin: Negative for rash  Neurological: Negative for dizziness, seizures and weakness  All other systems reviewed and are negative  Past Medical History:   Diagnosis Date    ADHD (attention deficit hyperactivity disorder)     Asthma     sports induced    [facility]ari malformation type II ([facility] [address] [facility]     Clostridium difficile infection     CTS (carpal tunnel syndrome)     Migraine     Papanicolaou smear for cervical cancer screening 2018    neg    Varicella     childhood       Past Surgical History:   Procedure Laterality Date    ACHILLES TENDON LENGTHENING      [place]  DELIVERY ONLY N/A 2016    Procedure:  SECTION ();   Surgeon: Ezio Knight MD;  [facility];  Service: Obstetrics    [place]  DELIVERY ONLY N/A 2021    Procedure: Theodor Christians () REPEAT;  Surgeon: Ezio Knight MD;  [facility];  Service: Obstetrics    [place] LAP,CHOLECYSTECTOMY N/A 2019    Procedure: CHOLECYSTECTOMY LAPAROSCOPIC;  Surgeon: Benjie Marks DO;  [facility];  Service: General    TUBAL LIGATION Bilateral 2021    Procedure: LIGATION/COAGULATION TUBAL;  Surgeon: Ezio Knight MD;  [facility];  Service: Obstetrics    WISDOM TOOTH EXTRACTION         Family History   Problem Relation Age of Onset    Hypothyroidism Mother     Rheumatic fever Mother     Hypertension Father     Depression Father     Alzheimer's disease Father     ADD / ADHD Brother     Diabetes Maternal Grandfather     Hearing loss Paternal Grandfather     Diabetes Paternal Grandfather     Hypertension Paternal Grandfather        Social History     Occupational History    Not on file   Tobacco Use    Smoking status: Never Smoker    Smokeless tobacco: Never Used   Vaping Use    Vaping Use: Never used   Substance and Sexual Activity    Alcohol use: Not Currently     Comment: social    Drug use: No    Sexual activity: Yes     Partners: Male     Birth control/protection: OCP         Current Outpatient Medications:     omeprazole (PriLOSEC) 40 MG capsule, Take 1 capsule (40 mg total) by mouth daily, Disp: 30 capsule, Rfl: 3    Butalbital-APAP-Caffeine (Fioricet) -40 MG CAPS, Take 1 tablet by mouth 2 (two) times a day (Patient not taking: Reported on 2/11/2022 ), Disp: 30 capsule, Rfl: 1    cetirizine (ZyrTEC) 10 mg tablet, Take 10 mg by mouth daily (Patient not taking: Reported on 2/11/2022 ), Disp: , Rfl:     Cholecalciferol 25 MCG (1000 UT) CHEW, Chew 1 tablet daily 5000 iu (Patient not taking: Reported on 2/11/2022 ), Disp: , Rfl:     ciclopirox (PENLAC) 8 % solution, Apply topically daily at bedtime (Patient not taking: Reported on 2/11/2022 ), Disp: 6 6 mL, Rfl: 5    clotrimazole-betamethasone (LOTRISONE) 1-0 05 % cream, Apply topically 2 (two) times a day (Patient not taking: Reported on 2/11/2022 ), Disp: 45 g, Rfl: 3    famotidine (PEPCID) 40 MG tablet, Take 1 tablet (40 mg total) by mouth 2 (two) times a day (Patient not taking: Reported on 2/11/2022 ), Disp: 60 tablet, Rfl: 3    fluticasone-vilanterol (BREO ELLIPTA) 100-25 mcg/inh inhaler, Inhale 1 puff daily Rinse mouth after use , Disp: 2 Inhaler, Rfl: 0    gabapentin (NEURONTIN) 300 mg capsule, Take 1 PO HS x 5 days, then 1 PO BID x 5 days, then 1 PO TID, Disp: 90 capsule, Rfl: 0    Prenatal Vit-Iron Carbonyl-FA (prenatal multivitamin) TABS, Take 1 tablet by mouth daily (Patient not taking: Reported on 2/11/2022 ), Disp: , Rfl:     No Known Allergies    Physical Exam:    /66   Pulse 70   Ht 5' 7" (1 702 m)   Wt 105 kg (231 lb)   BMI 36 18 kg/m²     Constitutional:normal, well developed, well nourished, alert, in no distress and non-toxic and no overt pain behavior    Eyes:anicteric  HEENT:grossly intact  Neck:supple, symmetric, trachea midline and no masses   Pulmonary:even and unlabored  Cardiovascular:No edema or pitting edema present  Skin:Normal without rashes or lesions and well hydrated  Psychiatric:Mood and affect appropriate  Neurologic:Cranial Nerves II-XII grossly intact  Musculoskeletal:normal gait  Tenderness over the bilateral occipital grooves      Imaging  No orders to display         No orders of the defined types were placed in this encounter

## 2022-02-23 ENCOUNTER — OFFICE VISIT (OUTPATIENT)
Dept: INTERNAL MEDICINE CLINIC | Age: 37
End: 2022-02-23
Payer: COMMERCIAL

## 2022-02-23 VITALS
HEIGHT: 67 IN | BODY MASS INDEX: 36.1 KG/M2 | WEIGHT: 230 LBS | HEART RATE: 68 BPM | OXYGEN SATURATION: 97 % | DIASTOLIC BLOOD PRESSURE: 62 MMHG | SYSTOLIC BLOOD PRESSURE: 118 MMHG | TEMPERATURE: 98.1 F

## 2022-02-23 DIAGNOSIS — R63.5 WEIGHT GAIN: ICD-10-CM

## 2022-02-23 DIAGNOSIS — Z13.228 SCREENING FOR METABOLIC DISORDER: ICD-10-CM

## 2022-02-23 DIAGNOSIS — Z11.59 ENCOUNTER FOR HEPATITIS C SCREENING TEST FOR LOW RISK PATIENT: ICD-10-CM

## 2022-02-23 DIAGNOSIS — R53.83 FATIGUE, UNSPECIFIED TYPE: ICD-10-CM

## 2022-02-23 DIAGNOSIS — Z00.00 ANNUAL PHYSICAL EXAM: Primary | ICD-10-CM

## 2022-02-23 PROCEDURE — 99395 PREV VISIT EST AGE 18-39: CPT | Performed by: PHYSICIAN ASSISTANT

## 2022-02-23 NOTE — PROGRESS NOTES
Assessment and Plan:     Problem List Items Addressed This Visit     None      Visit Diagnoses     Annual physical exam    -  Primary    check labs, schedule f/u with gyn    Fatigue, unspecified type        Relevant Orders    Comprehensive metabolic panel    CBC and differential    Hepatitis C antibody    TSH, 3rd generation    Lyme Total Antibody Profile with reflex to WB    Cortisol Level, AM Specimen    Weight gain        Relevant Orders    Comprehensive metabolic panel    CBC and differential    Hepatitis C antibody    TSH, 3rd generation    Lyme Total Antibody Profile with reflex to WB    Cortisol Level, AM Specimen    Encounter for hepatitis C screening test for low risk patient        Relevant Orders    Hepatitis C antibody    Screening for metabolic disorder        Relevant Orders    Comprehensive metabolic panel    CBC and differential    TSH, 3rd generation    Lyme Total Antibody Profile with reflex to WB    Cortisol Level, AM Specimen        BMI Counseling: Body mass index is 36 02 kg/m²  The BMI is above normal  Nutrition recommendations include decreasing portion sizes, encouraging healthy choices of fruits and vegetables, decreasing fast food intake and consuming healthier snacks  Exercise recommendations include moderate physical activity 150 minutes/week and strength training exercises  Rationale for BMI follow-up plan is due to patient being overweight or obese  Preventive health issues were discussed with patient, and age appropriate screening tests were ordered as noted in patient's After Visit Summary  Personalized health advice and appropriate referrals for health education or preventive services given if needed, as noted in patient's After Visit Summary       History of Present Illness:     Patient presents for comprehensive PE    Pt reports she has not been able to lose weight since the birth of her son 8 months ago  She states she has been less active at her job, having difficulty finding time to exercise  Concerned with hormone problem since having son  Trying to follow healthy diet, limiting carbs    Patient Care Team:  Hardik Palacios DO as PCP - General (Family Medicine)  Luis Fernando Irene DO (Pain Medicine)  Ming Rosales as Nurse Practitioner (Pain Medicine)  Maeve Parsons PA-C as Physician Assistant (Obstetrics and Gynecology)  Lisa Mayorga MD (Maternal and Fetal Medicine)  Nick Ellis MD (Maternal and Fetal Medicine)  Tres Iverson MD (Maternal and Fetal Medicine)  Isak Wade MD (Maternal and Fetal Medicine)  Florencia Nyhan, MD (Maternal and Fetal Medicine)  Tim Iverson MD (Maternal and Fetal Medicine)     Review of Systems:     Review of Systems   Constitutional: Positive for activity change, fatigue and unexpected weight change (weight gain 10 lbs since january)  Negative for appetite change, chills and diaphoresis  HENT: Negative for congestion and sore throat  Eyes: Negative for photophobia, pain, redness and itching  Respiratory: Negative for cough, chest tightness and shortness of breath  Cardiovascular: Negative for chest pain and leg swelling  Gastrointestinal: Negative for abdominal pain, constipation, diarrhea and nausea  Loose / soft stools 3 x / day   Genitourinary: Negative for flank pain and frequency  Musculoskeletal: Positive for arthralgias (b/l knee pain )  Negative for back pain, gait problem, joint swelling and myalgias  Skin: Negative for rash  Neurological: Negative for dizziness, light-headedness and headaches  Hematological: Does not bruise/bleed easily  Psychiatric/Behavioral: Negative for sleep disturbance  The patient is not nervous/anxious         Problem List:     Patient Active Problem List   Diagnosis    Back pain    Mild persistent asthma without complication    Neck pain    Cholelithiasis    Occipital headache    Cervical paraspinal muscle spasm    [facility] mal[facility] ([facility] )    Class 2 obesity in adult    Bilateral occipital neuralgia    Chronic migraine without aura without status migrainosus, not intractable    Paresthesia of left arm    Paresthesia of right arm    AMA (advanced maternal age) multigravida 35+    History of  delivery, antepartum    38 weeks gestation of pregnancy    History of pre-eclampsia    Gestational hypertension    S/P repeat low transverse     Status post bilateral salpingectomy    Gastroesophageal reflux disease    Patellofemoral arthritis of right knee    Patella blaine      Past Medical and Surgical History:     Past Medical History:   Diagnosis Date    ADHD (attention deficit hyperactivity disorder)     Asthma     sports induced    Chiari malformation type II ([facility] )     Clostridium difficile infection     CTS (carpal tunnel syndrome)     Migraine     Papanicolaou smear for cervical cancer screening 2018    neg    Varicella     childhood     Past Surgical History:   Procedure Laterality Date    ACHILLES TENDON LENGTHENING      [place]  DELIVERY ONLY N/A 2016    Procedure:  SECTION ();   Surgeon: Donn Bush MD;  [facility];  Service: Obstetrics    [place]  DELIVERY ONLY N/A 2021    Procedure: Leeroy Rosalba () REPEAT;  Surgeon: Donn Bush MD;  [facility];  Service: Obstetrics    [place] LAP,CHOLECYSTECTOMY N/A 2019    Procedure: CHOLECYSTECTOMY LAPAROSCOPIC;  Surgeon: Katherine Wetzel DO;  [facility];  Service: General    TUBAL LIGATION Bilateral 2021    Procedure: LIGATION/COAGULATION TUBAL;  Surgeon: Donn Bush MD;  [facility];  Service: Obstetrics    WISDOM TOOTH EXTRACTION        Family History:     Family History   Problem Relation Age of Onset    Hypothyroidism Mother     Rheumatic fever Mother     Hypertension Father     Depression Father     Alzheimer's disease Father     ADD / ADHD Brother     Diabetes Maternal Grandfather     Hearing loss Paternal Grandfather     Diabetes Paternal Grandfather     Hypertension Paternal Grandfather       Social History:     Social History     Socioeconomic History    Marital status: /Civil Union     Spouse name: None    Number of children: 1    Years of education: None    Highest education level: None   Occupational History    None   Tobacco Use    Smoking status: Never Smoker    Smokeless tobacco: Never Used   Vaping Use    Vaping Use: Never used   Substance and Sexual Activity    Alcohol use: Not Currently     Comment: social    Drug use: No    Sexual activity: Yes     Partners: Male     Birth control/protection: OCP   Other Topics Concern    None   Social History Narrative    None     Social Determinants of Health     Financial Resource Strain: Not on file   Food Insecurity: Not on file   Transportation Needs: Not on file   Physical Activity: Not on file   Stress: Not on file   Social Connections: Not on file   Intimate Partner Violence: Not on file   Housing Stability: Not on file      Medications and Allergies:     Current Outpatient Medications   Medication Sig Dispense Refill    fluticasone-vilanterol (BREO ELLIPTA) 100-25 mcg/inh inhaler Inhale 1 puff daily Rinse mouth after use  (Patient taking differently: Inhale 1 puff daily Rinse mouth after use  prn ) 2 Inhaler 0    gabapentin (NEURONTIN) 300 mg capsule Take 1 PO HS x 5 days, then 1 PO BID x 5 days, then 1 PO TID 90 capsule 0    omeprazole (PriLOSEC) 40 MG capsule Take 1 capsule (40 mg total) by mouth daily 30 capsule 3    cetirizine (ZyrTEC) 10 mg tablet Take 10 mg by mouth daily (Patient not taking: Reported on 2/11/2022 )      ciclopirox (PENLAC) 8 % solution Apply topically daily at bedtime (Patient not taking: Reported on 2/11/2022 ) 6 6 mL 5    clotrimazole-betamethasone (LOTRISONE) 1-0 05 % cream Apply topically 2 (two) times a day (Patient not taking: Reported on 2/11/2022 ) 45 g 3     No current facility-administered medications for this visit  No Known Allergies   Immunizations:     Immunization History   Administered Date(s) Administered    COVID-19 PFIZER VACCINE 0 3 ML IM 06/03/2021, 06/30/2021, 01/15/2022    INFLUENZA 09/20/2012, 10/01/2019, 10/11/2021    Influenza, injectable, quadrivalent, preservative free 0 5 mL 11/17/2020    Influenza, seasonal, injectable 09/20/2012, 10/01/2014    Tdap 08/28/2012, 07/01/2016, 04/08/2021      Health Maintenance:         Topic Date Due    Hepatitis C Screening  Never done    Cervical Cancer Screening  05/02/2021    HIV Screening  Completed         Topic Date Due    Pneumococcal Vaccine: Pediatrics (0 to 5 Years) and At-Risk Patients (6 to 59 Years) (1 of 2 - PPSV23) Never done              Physical Exam:     /62   Pulse 68   Temp 98 1 °F (36 7 °C) (Temporal)   Ht 5' 7" (1 702 m)   Wt 104 kg (230 lb)   SpO2 97%   BMI 36 02 kg/m²     Physical Exam  Vitals reviewed  Constitutional:       General: She is not in acute distress  HENT:      Head: Normocephalic and atraumatic  Right Ear: Tympanic membrane, ear canal and external ear normal       Left Ear: Tympanic membrane, ear canal and external ear normal       Nose: Nose normal       Mouth/Throat:      Mouth: Mucous membranes are moist    Eyes:      Extraocular Movements: Extraocular movements intact  Conjunctiva/sclera: Conjunctivae normal       Pupils: Pupils are equal, round, and reactive to light  Cardiovascular:      Rate and Rhythm: Normal rate and regular rhythm  Pulmonary:      Effort: Pulmonary effort is normal  No respiratory distress  Breath sounds: Normal breath sounds  No wheezing, rhonchi or rales  Abdominal:      General: Bowel sounds are normal  There is no distension  Tenderness: There is no abdominal tenderness  There is no guarding  Musculoskeletal:         General: Normal range of motion  Cervical back: Normal range of motion  Right lower leg: No edema  Left lower leg: No edema  Lymphadenopathy:      Cervical: No cervical adenopathy  Skin:     Findings: No erythema or rash  Neurological:      General: No focal deficit present  Mental Status: She is alert and oriented to person, place, and time     Psychiatric:         Mood and Affect: Mood normal          Behavior: Behavior normal           Arianna Ferguson PA-C

## 2022-02-24 ENCOUNTER — PROCEDURE VISIT (OUTPATIENT)
Dept: PAIN MEDICINE | Facility: CLINIC | Age: 37
End: 2022-02-24
Payer: COMMERCIAL

## 2022-02-24 VITALS
HEART RATE: 67 BPM | DIASTOLIC BLOOD PRESSURE: 76 MMHG | SYSTOLIC BLOOD PRESSURE: 112 MMHG | HEIGHT: 67 IN | BODY MASS INDEX: 35.94 KG/M2 | WEIGHT: 229 LBS

## 2022-02-24 DIAGNOSIS — M54.81 BILATERAL OCCIPITAL NEURALGIA: Primary | ICD-10-CM

## 2022-02-24 PROCEDURE — 64405 NJX AA&/STRD GR OCPL NRV: CPT | Performed by: ANESTHESIOLOGY

## 2022-02-24 RX ORDER — BUPIVACAINE HYDROCHLORIDE 2.5 MG/ML
10 INJECTION, SOLUTION EPIDURAL; INFILTRATION; INTRACAUDAL ONCE
Status: COMPLETED | OUTPATIENT
Start: 2022-02-24 | End: 2022-02-24

## 2022-02-24 RX ORDER — TRIAMCINOLONE ACETONIDE 40 MG/ML
40 INJECTION, SUSPENSION INTRA-ARTICULAR; INTRAMUSCULAR ONCE
Status: COMPLETED | OUTPATIENT
Start: 2022-02-24 | End: 2022-02-24

## 2022-02-24 RX ADMIN — TRIAMCINOLONE ACETONIDE 40 MG: 40 INJECTION, SUSPENSION INTRA-ARTICULAR; INTRAMUSCULAR at 11:29

## 2022-02-24 RX ADMIN — BUPIVACAINE HYDROCHLORIDE 10 ML: 2.5 INJECTION, SOLUTION EPIDURAL; INFILTRATION; INTRACAUDAL at 10:32

## 2022-02-24 RX ADMIN — TRIAMCINOLONE ACETONIDE 40 MG: 40 INJECTION, SUSPENSION INTRA-ARTICULAR; INTRAMUSCULAR at 10:33

## 2022-02-24 NOTE — PROGRESS NOTES
Nerve block    Date/Time: 2/24/2022 12:22 PM  Performed by: Dilan Vail DO  Authorized by: Dilan Vail DO     Patient location:  [facility] Protocol:  Consent: Verbal consent obtained  Written consent obtained  Risks and benefits: risks, benefits and alternatives were discussed  Consent given by: patient  Time out: Immediately prior to procedure a "time out" was called to verify the correct patient, procedure, equipment, support staff and site/side marked as required  Timeout called at: 2/24/2022 10:17 AM   Patient understanding: patient states understanding of the procedure being performed  Patient consent: the patient's understanding of the procedure matches consent given  Procedure consent: procedure consent matches procedure scheduled  Site marked: the operative site was marked  Required items: required blood products, implants, devices, and special equipment available  Patient identity confirmed: verbally with patient      Indications:     Indications:  Pain relief  Location:     Body area:  Head    Head nerve:  Greater occipital    Nerve type:  Peripheral    Laterality:  Bilateral  Pre-procedure details:     Skin preparation:  Alcohol  Procedure details (see MAR for exact dosages): Block needle gauge:  25 G      Indication: Occipital headaches  Preoperative diagnosis: Occipital neuralgia  Postoperative diagnosis: Occipital neuralgia  Procedure:  Bilateral greater occipital nerve block    After discussing the risks, benefits, and alternatives to the procedure, the patient expressed understanding and wished to proceed  A procedural pause was conducted to verify: Correct patient identity, procedure to be performed and as applicable, correct side and site, correct patient position, and availability of implants, special equipment or special requirements  The appropriate side of the occiput was sterilely prepped using ChloraPrep   The right occipital artery was palpated, then a belinda was placed 2 cm lateral to the greater occipital protuberance  A 1 5 in 25 gauge needle was used to inject a total of 2 mL of 0 25% bupivacaine and 40 mg of triamcinolone after negative aspiration of heme, CSF, or other bodily fluids  The needle was then fanned in the direction of the greater occipital nerve  The identical procedure was repeated on the opposite side  The patient was discharged with no complications  The patient received a total steroid dose of 80 mg of triamcinolone

## 2022-02-26 ENCOUNTER — APPOINTMENT (OUTPATIENT)
Dept: LAB | Facility: MEDICAL CENTER | Age: 37
End: 2022-02-26
Payer: COMMERCIAL

## 2022-02-26 DIAGNOSIS — R63.5 WEIGHT GAIN: ICD-10-CM

## 2022-02-26 DIAGNOSIS — Z13.228 SCREENING FOR METABOLIC DISORDER: ICD-10-CM

## 2022-02-26 DIAGNOSIS — Z11.59 ENCOUNTER FOR HEPATITIS C SCREENING TEST FOR LOW RISK PATIENT: ICD-10-CM

## 2022-02-26 DIAGNOSIS — R53.83 FATIGUE, UNSPECIFIED TYPE: ICD-10-CM

## 2022-02-26 LAB
ALBUMIN SERPL BCP-MCNC: 4.1 G/DL (ref 3.5–5)
ALP SERPL-CCNC: 84 U/L (ref 46–116)
ALT SERPL W P-5'-P-CCNC: 27 U/L (ref 12–78)
ANION GAP SERPL CALCULATED.3IONS-SCNC: 3 MMOL/L (ref 4–13)
AST SERPL W P-5'-P-CCNC: 16 U/L (ref 5–45)
BASOPHILS # BLD AUTO: 0.03 THOUSANDS/ΜL (ref 0–0.1)
BASOPHILS NFR BLD AUTO: 0 % (ref 0–1)
BILIRUB SERPL-MCNC: 0.45 MG/DL (ref 0.2–1)
BUN SERPL-MCNC: 11 MG/DL (ref 5–25)
CALCIUM SERPL-MCNC: 9.1 MG/DL (ref 8.3–10.1)
CHLORIDE SERPL-SCNC: 110 MMOL/L (ref 100–108)
CO2 SERPL-SCNC: 26 MMOL/L (ref 21–32)
CREAT SERPL-MCNC: 0.93 MG/DL (ref 0.6–1.3)
EOSINOPHIL # BLD AUTO: 0.06 THOUSAND/ΜL (ref 0–0.61)
EOSINOPHIL NFR BLD AUTO: 1 % (ref 0–6)
ERYTHROCYTE [DISTWIDTH] IN BLOOD BY AUTOMATED COUNT: 13.9 % (ref 11.6–15.1)
GFR SERPL CREATININE-BSD FRML MDRD: 78 ML/MIN/1.73SQ M
GLUCOSE P FAST SERPL-MCNC: 89 MG/DL (ref 65–99)
HCT VFR BLD AUTO: 41.8 % (ref 34.8–46.1)
HCV AB SER QL: NORMAL
HGB BLD-MCNC: 13 G/DL (ref 11.5–15.4)
IMM GRANULOCYTES # BLD AUTO: 0.02 THOUSAND/UL (ref 0–0.2)
IMM GRANULOCYTES NFR BLD AUTO: 0 % (ref 0–2)
LYMPHOCYTES # BLD AUTO: 2.69 THOUSANDS/ΜL (ref 0.6–4.47)
LYMPHOCYTES NFR BLD AUTO: 29 % (ref 14–44)
MCH RBC QN AUTO: 25.4 PG (ref 26.8–34.3)
MCHC RBC AUTO-ENTMCNC: 31.1 G/DL (ref 31.4–37.4)
MCV RBC AUTO: 82 FL (ref 82–98)
MONOCYTES # BLD AUTO: 0.55 THOUSAND/ΜL (ref 0.17–1.22)
MONOCYTES NFR BLD AUTO: 6 % (ref 4–12)
NEUTROPHILS # BLD AUTO: 5.85 THOUSANDS/ΜL (ref 1.85–7.62)
NEUTS SEG NFR BLD AUTO: 64 % (ref 43–75)
NRBC BLD AUTO-RTO: 0 /100 WBCS
PLATELET # BLD AUTO: 272 THOUSANDS/UL (ref 149–390)
PMV BLD AUTO: 12.3 FL (ref 8.9–12.7)
POTASSIUM SERPL-SCNC: 4.3 MMOL/L (ref 3.5–5.3)
PROT SERPL-MCNC: 8.3 G/DL (ref 6.4–8.2)
RBC # BLD AUTO: 5.11 MILLION/UL (ref 3.81–5.12)
SODIUM SERPL-SCNC: 139 MMOL/L (ref 136–145)
TSH SERPL DL<=0.05 MIU/L-ACNC: 0.9 UIU/ML (ref 0.36–3.74)
WBC # BLD AUTO: 9.2 THOUSAND/UL (ref 4.31–10.16)

## 2022-02-26 PROCEDURE — 82533 TOTAL CORTISOL: CPT

## 2022-02-26 PROCEDURE — 36415 COLL VENOUS BLD VENIPUNCTURE: CPT

## 2022-02-26 PROCEDURE — 84443 ASSAY THYROID STIM HORMONE: CPT

## 2022-02-26 PROCEDURE — 86803 HEPATITIS C AB TEST: CPT

## 2022-02-26 PROCEDURE — 86618 LYME DISEASE ANTIBODY: CPT

## 2022-02-26 PROCEDURE — 85025 COMPLETE CBC W/AUTO DIFF WBC: CPT

## 2022-02-26 PROCEDURE — 80053 COMPREHEN METABOLIC PANEL: CPT

## 2022-02-27 ENCOUNTER — TELEPHONE (OUTPATIENT)
Dept: OTHER | Facility: OTHER | Age: 37
End: 2022-02-27

## 2022-02-27 LAB — CORTIS AM PEAK SERPL-MCNC: 0.8 UG/DL (ref 4.2–22.4)

## 2022-02-27 NOTE — TELEPHONE ENCOUNTER
Lab Result: Cortisol Level, AM of 0 8   Date/Time Drawn: 02/26/2022 8:06am   Ordering Provider: Eden Murray PA-C   Lab Personnel's Name: Jaron Oleary from [facility]       The following critical/stat result was read back to the lab as stated above and [employer] to the on-call provider

## 2022-02-28 NOTE — TELEPHONE ENCOUNTER
Sana Peterson, this does need to be addressed but can wait for Luis Copeland to return this week  Please remove my name is PCP as I have not seen her in many years    Thank you

## 2022-03-01 LAB — B BURGDOR IGG+IGM SER-ACNC: 31

## 2022-03-03 ENCOUNTER — TELEPHONE (OUTPATIENT)
Dept: PAIN MEDICINE | Facility: CLINIC | Age: 37
End: 2022-03-03

## 2022-03-07 NOTE — TELEPHONE ENCOUNTER
Pt reports 80% improvement post inj   Pain level 1-2/10  Pt aware I will call next week for an update

## 2022-03-14 ENCOUNTER — PROCEDURE VISIT (OUTPATIENT)
Dept: OBGYN CLINIC | Facility: CLINIC | Age: 37
End: 2022-03-14
Payer: COMMERCIAL

## 2022-03-14 VITALS
BODY MASS INDEX: 35.94 KG/M2 | DIASTOLIC BLOOD PRESSURE: 76 MMHG | HEART RATE: 66 BPM | HEIGHT: 67 IN | WEIGHT: 229 LBS | SYSTOLIC BLOOD PRESSURE: 108 MMHG

## 2022-03-14 DIAGNOSIS — M17.11 PATELLOFEMORAL ARTHRITIS OF RIGHT KNEE: Primary | ICD-10-CM

## 2022-03-14 PROCEDURE — 20610 DRAIN/INJ JOINT/BURSA W/O US: CPT | Performed by: ORTHOPAEDIC SURGERY

## 2022-03-14 NOTE — PROGRESS NOTES
1  Patellofemoral arthritis of right knee       Patient is here for his 1st injection of Orthovisc into the right knee  Patient reports pain  All organ systems normal except: arthralgias   Physical exam of the knee shows no effusion no ecchymosis  Large joint arthrocentesis: R knee  Universal Protocol:  Consent: Verbal consent obtained  Risks and benefits: risks, benefits and alternatives were discussed  Consent given by: patient  Time out: Immediately prior to procedure a "time out" was called to verify the correct patient, procedure, equipment, support staff and site/side marked as required    Timeout called at: 3/14/2022 5:32 PM   Patient understanding: patient states understanding of the procedure being performed  Site marked: the operative site was marked  Patient identity confirmed: verbally with patient    Supporting Documentation  Indications: pain   Procedure Details  Location: knee - R knee  Preparation: Patient was prepped and draped in the usual sterile fashion  Needle size: 22 G  Ultrasound guidance: no  Approach: anterolateral  Medications administered: 30 mg sodium hyaluronate 30 mg/2 mL    Patient tolerance: patient tolerated the procedure well with no immediate complications  Dressing:  Sterile dressing applied          Patient tolerated procedure follow up Orthovisc #2 Right knee    Scribe Attestation    I,:  Vega Brennan am acting as a scribe while in the presence of the attending physician :       I,:  Latosha Ibanez DO personally performed the services described in this documentation    as scribed in my presence :

## 2022-03-21 ENCOUNTER — PROCEDURE VISIT (OUTPATIENT)
Dept: OBGYN CLINIC | Facility: CLINIC | Age: 37
End: 2022-03-21
Payer: COMMERCIAL

## 2022-03-21 VITALS — BODY MASS INDEX: 35.94 KG/M2 | WEIGHT: 229 LBS | HEIGHT: 67 IN

## 2022-03-21 DIAGNOSIS — M17.11 PATELLOFEMORAL ARTHRITIS OF RIGHT KNEE: Primary | ICD-10-CM

## 2022-03-21 PROCEDURE — 20610 DRAIN/INJ JOINT/BURSA W/O US: CPT | Performed by: ORTHOPAEDIC SURGERY

## 2022-03-21 NOTE — PROGRESS NOTES
1  Patellofemoral arthritis of right knee  Large joint arthrocentesis     Patient is here for her 2nd injection of Orthovisc into the right knee  Patient reports the pain not much improved today, had about 2 days of mild relief after the 1st injection  Physical exam of the knee shows no effusion no ecchymosis  All other organ systems normal    Large joint arthrocentesis: R knee  Universal Protocol:  Consent given by: patient  Time out: Immediately prior to procedure a "time out" was called to verify the correct patient, procedure, equipment, support staff and site/side marked as required    Site marked: the operative site was marked  Supporting Documentation  Indications: pain   Procedure Details  Location: knee - R knee  Preparation: Patient was prepped and draped in the usual sterile fashion  Needle size: 22 G  Ultrasound guidance: no  Approach: anterolateral  Medications administered: 30 mg sodium hyaluronate 30 mg/2 mL    Patient tolerance: patient tolerated the procedure well with no immediate complications  Dressing:  Sterile dressing applied          Patient tolerated procedure follow up 1 week

## 2022-03-23 ENCOUNTER — OFFICE VISIT (OUTPATIENT)
Dept: PAIN MEDICINE | Facility: CLINIC | Age: 37
End: 2022-03-23
Payer: COMMERCIAL

## 2022-03-23 VITALS
DIASTOLIC BLOOD PRESSURE: 67 MMHG | HEIGHT: 67 IN | HEART RATE: 66 BPM | SYSTOLIC BLOOD PRESSURE: 100 MMHG | WEIGHT: 228 LBS | BODY MASS INDEX: 35.79 KG/M2

## 2022-03-23 DIAGNOSIS — M54.81 BILATERAL OCCIPITAL NEURALGIA: Primary | ICD-10-CM

## 2022-03-23 DIAGNOSIS — M62.838 CERVICAL PARASPINAL MUSCLE SPASM: ICD-10-CM

## 2022-03-23 PROCEDURE — 99213 OFFICE O/P EST LOW 20 MIN: CPT | Performed by: NURSE PRACTITIONER

## 2022-03-23 NOTE — PROGRESS NOTES
Assessment:  1  Cervical paraspinal muscle spasm    2  Bilateral occipital neuralgia        Plan:  1  We can repeat bilateral occipital nerve blocks p r n     I discussed with the patient that since there has been moderate to significant improvement in the pain symptoms, we will hold off on any repeat injections at this point in time  However, I reviewed with the patient that if their symptoms should return or worsen,  they should call our office to schedule to discuss repeating the injection  2  Patient may continue gabapentin 300 mg q h s     She does not require refills today  3  Patient will follow-up on a p r n  basis at this time     M*Modal software was used to dictate this note  It may contain errors with dictating incorrect words or incorrect spelling  Please contact the provider directly with any questions  History of Present Illness: The patient is a 43 y o  female with a history of occipital neuralgia last seen on 2/11/22 who presents for a follow up office visit in regards to chronic pain  Patient is status post bilateral occipital nerve blocks on February 24, 2022 and continues with ongoing 50% improvement of her pain at this time  She also has weaned down her gabapentin from 300 mg t i d  to 300 mg q h s  without side effects  She rates her pain a 3/10 on the numeric pain rating scale    I have personally reviewed and/or updated the patient's past medical history, past surgical history, family history, social history, current medications, allergies, and vital signs today  Review of Systems:    Review of Systems   Respiratory: Negative for shortness of breath  Cardiovascular: Negative for chest pain  Gastrointestinal: Negative for constipation, diarrhea, nausea and vomiting  Musculoskeletal: Negative for arthralgias, gait problem, joint swelling and myalgias  Skin: Negative for rash  Neurological: Negative for dizziness, seizures and weakness     All other systems reviewed and are negative  Past Medical History:   Diagnosis Date    ADHD (attention deficit hyperactivity disorder)     Asthma     sports induced    Chiari malformation type II ([facility] [address] )     Clostridium difficile infection     CTS (carpal tunnel syndrome)     Migraine     Papanicolaou smear for cervical cancer screening 2018    neg    Varicella     childhood       Past Surgical History:   Procedure Laterality Date    ACHILLES TENDON LENGTHENING      GA  DELIVERY ONLY N/A 2016    Procedure:  SECTION ();   Surgeon: Ash Woods MD;  [facility];  Service: Obstetrics    GA  DELIVERY ONLY N/A 2021    Procedure: Papo Sg () REPEAT;  Surgeon: Ash Woods MD;  [facility];  Service: Obstetrics    GA LAP,CHOLECYSTECTOMY N/A 2019    Procedure: CHOLECYSTECTOMY LAPAROSCOPIC;  Surgeon: Nellie Kessler DO;  [facility];  Service: General    TUBAL LIGATION Bilateral 2021    Procedure: LIGATION/COAGULATION TUBAL;  Surgeon: Ash Woods MD;  [facility];  Service: Obstetrics    WISDOM TOOTH EXTRACTION         Family History   Problem Relation Age of Onset    Hypothyroidism Mother     Rheumatic fever Mother     Hypertension Father     Depression Father     Alzheimer's disease Father     ADD / ADHD Brother     Diabetes Maternal Grandfather     Hearing loss Paternal Grandfather     Diabetes Paternal Grandfather     Hypertension Paternal Grandfather        Social History     Occupational History    Not on file   Tobacco Use    Smoking status: Never Smoker    Smokeless tobacco: Never Used   Vaping Use    Vaping Use: Never used   Substance and Sexual Activity    Alcohol use: Not Currently     Comment: social    Drug use: No    Sexual activity: Yes     Partners: Male     Birth control/protection: OCP         Current Outpatient Medications:     cetirizine (ZyrTEC) 10 mg tablet, Take 10 mg by mouth daily (Patient not taking: Reported on 2/11/2022 ), Disp: , Rfl:     ciclopirox (PENLAC) 8 % solution, Apply topically daily at bedtime (Patient not taking: Reported on 2/11/2022 ), Disp: 6 6 mL, Rfl: 5    clotrimazole-betamethasone (LOTRISONE) 1-0 05 % cream, Apply topically 2 (two) times a day (Patient not taking: Reported on 2/11/2022 ), Disp: 45 g, Rfl: 3    fluticasone-vilanterol (BREO ELLIPTA) 100-25 mcg/inh inhaler, Inhale 1 puff daily Rinse mouth after use  (Patient taking differently: Inhale 1 puff daily Rinse mouth after use  prn ), Disp: 2 Inhaler, Rfl: 0    gabapentin (NEURONTIN) 300 mg capsule, Take 1 PO HS x 5 days, then 1 PO BID x 5 days, then 1 PO TID, Disp: 90 capsule, Rfl: 0    omeprazole (PriLOSEC) 40 MG capsule, Take 1 capsule (40 mg total) by mouth daily, Disp: 30 capsule, Rfl: 3    No Known Allergies    Physical Exam:    There were no vitals taken for this visit  Constitutional:normal, well developed, well nourished, alert, in no distress and non-toxic and no overt pain behavior  Eyes:anicteric  HEENT:grossly intact  Neck:supple, symmetric, trachea midline and no masses   Pulmonary:even and unlabored  Cardiovascular:No edema or pitting edema present  Skin:Normal without rashes or lesions and well hydrated  Psychiatric:Mood and affect appropriate  Neurologic:Cranial Nerves II-XII grossly intact  Musculoskeletal:normal gait      Imaging  No orders to display         No orders of the defined types were placed in this encounter

## 2022-03-26 ENCOUNTER — APPOINTMENT (OUTPATIENT)
Dept: LAB | Facility: MEDICAL CENTER | Age: 37
End: 2022-03-26
Payer: COMMERCIAL

## 2022-03-26 DIAGNOSIS — E27.40 LOW SERUM CORTISOL LEVEL (HCC): ICD-10-CM

## 2022-03-26 LAB — CORTIS AM PEAK SERPL-MCNC: 8.4 UG/DL (ref 4.2–22.4)

## 2022-03-26 PROCEDURE — 82533 TOTAL CORTISOL: CPT

## 2022-03-28 ENCOUNTER — PROCEDURE VISIT (OUTPATIENT)
Dept: OBGYN CLINIC | Facility: CLINIC | Age: 37
End: 2022-03-28
Payer: COMMERCIAL

## 2022-03-28 VITALS — BODY MASS INDEX: 35.79 KG/M2 | WEIGHT: 228 LBS | HEIGHT: 67 IN

## 2022-03-28 DIAGNOSIS — M17.11 PATELLOFEMORAL ARTHRITIS OF RIGHT KNEE: Primary | ICD-10-CM

## 2022-03-28 PROCEDURE — 20610 DRAIN/INJ JOINT/BURSA W/O US: CPT | Performed by: ORTHOPAEDIC SURGERY

## 2022-03-28 NOTE — PROGRESS NOTES
1  Patellofemoral arthritis of right knee       Patient is here for her 3rd injection of Orthovisc into the right knee  Patient reports minimal improvement  All organ systems normal  Physical exam of the knee shows no effusion no ecchymosis  Large joint arthrocentesis: R knee  Universal Protocol:  Consent given by: patient    Supporting Documentation  Indications: pain   Procedure Details  Location: knee - R knee  Needle size: 22 G  Ultrasound guidance: no  Approach: anterolateral  Medications administered: 30 mg sodium hyaluronate 30 mg/2 mL    Patient tolerance: patient tolerated the procedure well with no immediate complications          Patient tolerated procedure follow up p r n

## 2022-04-15 ENCOUNTER — ANESTHESIA EVENT (OUTPATIENT)
Dept: ANESTHESIOLOGY | Facility: HOSPITAL | Age: 37
End: 2022-04-15

## 2022-04-15 ENCOUNTER — ANESTHESIA (OUTPATIENT)
Dept: ANESTHESIOLOGY | Facility: HOSPITAL | Age: 37
End: 2022-04-15

## 2022-04-22 ENCOUNTER — ANESTHESIA EVENT (OUTPATIENT)
Dept: GASTROENTEROLOGY | Facility: AMBULARY SURGERY CENTER | Age: 37
End: 2022-04-22

## 2022-04-22 ENCOUNTER — ANESTHESIA (OUTPATIENT)
Dept: GASTROENTEROLOGY | Facility: AMBULARY SURGERY CENTER | Age: 37
End: 2022-04-22

## 2022-04-22 ENCOUNTER — HOSPITAL ENCOUNTER (OUTPATIENT)
Dept: GASTROENTEROLOGY | Facility: AMBULARY SURGERY CENTER | Age: 37
Setting detail: OUTPATIENT SURGERY
Discharge: HOME | End: 2022-04-22
Attending: INTERNAL MEDICINE | Admitting: INTERNAL MEDICINE
Payer: COMMERCIAL

## 2022-04-22 VITALS
TEMPERATURE: 97.4 F | HEART RATE: 62 BPM | OXYGEN SATURATION: 100 % | WEIGHT: 228 LBS | SYSTOLIC BLOOD PRESSURE: 118 MMHG | HEIGHT: 67 IN | RESPIRATION RATE: 15 BRPM | BODY MASS INDEX: 35.79 KG/M2 | DIASTOLIC BLOOD PRESSURE: 74 MMHG

## 2022-04-22 DIAGNOSIS — K29.70 GASTRITIS WITHOUT BLEEDING, UNSPECIFIED CHRONICITY, UNSPECIFIED GASTRITIS TYPE: ICD-10-CM

## 2022-04-22 DIAGNOSIS — K21.9 GASTROESOPHAGEAL REFLUX DISEASE, UNSPECIFIED WHETHER ESOPHAGITIS PRESENT: ICD-10-CM

## 2022-04-22 LAB
EXT PREGNANCY TEST URINE: NEGATIVE
EXT. CONTROL: NORMAL

## 2022-04-22 PROCEDURE — 88305 TISSUE EXAM BY PATHOLOGIST: CPT | Performed by: PATHOLOGY

## 2022-04-22 PROCEDURE — 43239 EGD BIOPSY SINGLE/MULTIPLE: CPT | Performed by: INTERNAL MEDICINE

## 2022-04-22 PROCEDURE — 88313 SPECIAL STAINS GROUP 2: CPT | Performed by: PATHOLOGY

## 2022-04-22 PROCEDURE — 81025 URINE PREGNANCY TEST: CPT | Performed by: INTERNAL MEDICINE

## 2022-04-22 RX ORDER — PANTOPRAZOLE SODIUM 40 MG/1
40 TABLET, DELAYED RELEASE ORAL
Qty: 180 TABLET | Refills: 0 | Status: SHIPPED | OUTPATIENT
Start: 2022-04-22 | End: 2022-07-21

## 2022-04-22 RX ORDER — PROPOFOL 10 MG/ML
INJECTION, EMULSION INTRAVENOUS AS NEEDED
Status: DISCONTINUED | OUTPATIENT
Start: 2022-04-22 | End: 2022-04-22

## 2022-04-22 RX ORDER — SODIUM CHLORIDE, SODIUM LACTATE, POTASSIUM CHLORIDE, CALCIUM CHLORIDE 600; 310; 30; 20 MG/100ML; MG/100ML; MG/100ML; MG/100ML
INJECTION, SOLUTION INTRAVENOUS CONTINUOUS PRN
Status: DISCONTINUED | OUTPATIENT
Start: 2022-04-22 | End: 2022-04-22

## 2022-04-22 RX ADMIN — PROPOFOL 50 MG: 10 INJECTION, EMULSION INTRAVENOUS at 12:41

## 2022-04-22 RX ADMIN — PROPOFOL 50 MG: 10 INJECTION, EMULSION INTRAVENOUS at 12:39

## 2022-04-22 RX ADMIN — SODIUM CHLORIDE, SODIUM LACTATE, POTASSIUM CHLORIDE, AND CALCIUM CHLORIDE: .6; .31; .03; .02 INJECTION, SOLUTION INTRAVENOUS at 12:30

## 2022-04-22 RX ADMIN — PROPOFOL 100 MG: 10 INJECTION, EMULSION INTRAVENOUS at 12:37

## 2022-04-22 NOTE — ANESTHESIA PREPROCEDURE EVALUATION
Procedure:  EGD    Relevant Problems   CARDIO   (+) Chronic migraine without aura without status migrainosus, not intractable   (+) Gestational hypertension      GI/HEPATIC   (+) Gastroesophageal reflux disease      /RENAL (within normal limits)      HEMATOLOGY (within normal limits)      MUSCULOSKELETAL   (+) Back pain   (+) Patellofemoral arthritis of right knee      NEURO/PSYCH   (+) Chronic migraine without aura without status migrainosus, not intractable   (+) History of pre-eclampsia   (+) Occipital headache   (+) Paresthesia of left arm   (+) Paresthesia of right arm      PULMONARY   (+) Mild persistent asthma without complication      Other   (+) Bilateral occipital neuralgia   (+) Cervical paraspinal muscle spasm   (+) Chiari malformation type I ([address] )   (+) Cholelithiasis   (+) Class 2 obesity in adult      EKG 4/19/2021:  Normal sinus rhythm  Normal ECG  When compared with ECG of 19-JAN-2019 00:59,  Vent  rate has increased BY  30 BPM    Lab Results   Component Value Date    WBC 9 20 02/26/2022    HGB 13 0 02/26/2022    HCT 41 8 02/26/2022    MCV 82 02/26/2022     02/26/2022     Lab Results   Component Value Date    SODIUM 139 02/26/2022    K 4 3 02/26/2022     (H) 02/26/2022    CO2 26 02/26/2022    BUN 11 02/26/2022    CREATININE 0 93 02/26/2022    GLUC 80 05/23/2021    CALCIUM 9 1 02/26/2022     Lab Results   Component Value Date    INR 0 98 01/19/2019    PROTIME 12 7 01/19/2019     Lab Results   Component Value Date    HGBA1C 5 2 08/25/2021            Physical Exam    Airway    Mallampati score: II  TM Distance: >3 FB  Neck ROM: full     Dental   No notable dental hx     Cardiovascular  Cardiovascular exam normal    Pulmonary  Pulmonary exam normal     Other Findings        Anesthesia Plan  ASA Score- 2     Anesthesia Type- IV sedation with anesthesia with ASA Monitors  Additional Monitors:   Airway Plan:           Plan Factors-Exercise tolerance (METS): >4 METS      Chart reviewed  EKG reviewed  Existing labs reviewed  Patient summary reviewed  Induction- intravenous  Postoperative Plan-     Informed Consent- Anesthetic plan and risks discussed with patient  I personally reviewed this patient with the CRNA  Discussed and agreed on the Anesthesia Plan with the CRNA  Karel Samson

## 2022-04-22 NOTE — ANESTHESIA POSTPROCEDURE EVALUATION
Post-Op Assessment Note    No complications documented      BP   120/62   Temp      Pulse  82   Resp   15   SpO2   100

## 2022-04-22 NOTE — H&P
History and Physical - [facility]  Deborah Juárez 43 y o  female MRN: [id number]    HPI: Deborah Juárez is a 43y o  year old female who presents for evaluation of EOE  Review of Systems    Historical Information   Past Medical History:   Diagnosis Date    ADHD (attention deficit hyperactivity disorder)     Asthma     sports induced    Chiari malformation type II ([facility] [address]     Clostridium difficile infection     CTS (carpal tunnel syndrome)     Migraine     Papanicolaou smear for cervical cancer screening 2018    neg    Varicella     childhood     Past Surgical History:   Procedure Laterality Date    ACHILLES TENDON LENGTHENING       SECTION      AL  DELIVERY ONLY N/A 2016    Procedure:  SECTION ();   Surgeon: Alonso Paget, MD;  [facility];  Service: Obstetrics    AL  DELIVERY ONLY N/A 2021    Procedure:  SECTION () REPEAT;  Surgeon: Alonso Paget, MD;  [facility];  Service: Obstetrics    AL LAP,CHOLECYSTECTOMY N/A 2019    Procedure: CHOLECYSTECTOMY LAPAROSCOPIC;  Surgeon: Meliton Siddiqui DO;  [facility];  Service: General    TUBAL LIGATION Bilateral 2021    Procedure: LIGATION/COAGULATION TUBAL;  Surgeon: Alonso Paget, MD;  [facility];  Service: Obstetrics    WISDOM TOOTH EXTRACTION       Social History   Social History     Substance and Sexual Activity   Alcohol Use Not Currently    Comment: social     Social History     Substance and Sexual Activity   Drug Use No     Social History     Tobacco Use   Smoking Status Never Smoker   Smokeless Tobacco Never Used     Family History   Problem Relation Age of Onset    Hypothyroidism Mother     Rheumatic fever Mother     Hypertension Father     Depression Father     Alzheimer's disease Father     ADD / ADHD Brother     Diabetes Maternal Grandfather     Hearing loss Paternal Grandfather     Diabetes Paternal Grandfather     Hypertension Paternal Grandfather        Meds/Allergies     (Not in a hospital admission)      No Known Allergies    Objective     /88   Pulse 71 Comment: sr  Temp 97 7 °F (36 5 °C) (Temporal)   Resp 17   Ht 5' 7" (1 702 m)   Wt 103 kg (228 lb)   SpO2 100%   BMI 35 71 kg/m²       PHYSICAL EXAM    Gen: NAD  CV: RRR  CHEST: Clear  ABD: soft, NT/ND  EXT: no edema  Neuro: AAO      ASSESSMENT/PLAN:  This is a 43y o  year old female here for evaluation of EOE  PLAN:   Procedure:  EGD

## 2022-04-22 NOTE — DISCHARGE INSTRUCTIONS
Upper Endoscopy   WHAT YOU NEED TO KNOW:   An upper endoscopy is also called an upper gastrointestinal (GI) endoscopy, or an esophagogastroduodenoscopy (EGD)  You may feel bloated, gassy, or have some abdominal discomfort after your procedure  Your throat may be sore for 24 to 36 hours  You may burp or pass gas from air that is still inside your body  DISCHARGE INSTRUCTIONS:   Call 911 if:   · You have sudden chest pain or trouble breathing  Seek care immediately if:   · You feel dizzy or faint  · You have trouble swallowing  · You have severe throat pain  · Your bowel movements are very dark or black  · Your abdomen is hard and firm and you have severe pain  · You vomit blood  Contact your healthcare provider if:   · You feel full or bloated and cannot burp or pass gas  · You have not had a bowel movement for 3 days after your procedure  · You have neck pain  · You have a fever or chills  · You have nausea or are vomiting  · You have a rash or hives  · You have questions or concerns about your endoscopy  Relieve a sore throat:  Suck on throat lozenges or crushed ice  Gargle with a small amount of warm salt water  Mix 1 teaspoon of salt and 1 cup of warm water to make salt water  Relieve gas and discomfort from bloating:  Lie on your right side with a heating pad on your abdomen  Take short walks to help pass gas  Eat small meals until bloating is relieved  Rest after your procedure:  Do not drive or make important decisions until the day after your procedure  Return to your normal activity as directed  You can usually return to work the day after your procedure  Follow up with your healthcare provider as directed:  Write down your questions so you remember to ask them during your visits  © Copyright [employer] 2022 Information is for End User's use only and may not be sold, redistributed or otherwise used for commercial purposes   All illustrations and images included in CareNotes® are the copyrighted property of [employer] D A M , Inc  or [employer]   The above information is an  only  It is not intended as medical advice for individual conditions or treatments  Talk to your doctor, nurse or pharmacist before following any medical regimen to see if it is safe and effective for you  Gastritis   WHAT YOU NEED TO KNOW:   Gastritis is inflammation or irritation of the lining of your stomach  DISCHARGE INSTRUCTIONS:   Call 911 for any of the following:   · You develop chest pain or shortness of breath  Seek care immediately if:   · You vomit blood  · You have black or bloody bowel movements  · You have severe stomach or back pain  Contact your healthcare provider if:   · You have a fever  · You have new or worsening symptoms, even after treatment  · You have questions or concerns about your condition or care  Medicines:   · Medicines  may be given to help treat a bacterial infection or decrease stomach acid  · Take your medicine as directed  Contact your healthcare provider if you think your medicine is not helping or if you have side effects  Tell him or her if you are allergic to any medicine  Keep a list of the medicines, vitamins, and herbs you take  Include the amounts, and when and why you take them  Bring the list or the pill bottles to follow-up visits  Carry your medicine list with you in case of an emergency  Manage or prevent gastritis:   · Do not smoke  Nicotine and other chemicals in cigarettes and cigars can make your symptoms worse and cause lung damage  Ask your healthcare provider for information if you currently smoke and need help to quit  E-cigarettes or smokeless tobacco still contain nicotine  Talk to your healthcare provider before you use these products  · Do not drink alcohol  Alcohol can prevent healing and make your gastritis worse   Talk to your healthcare provider if you need help to stop drinking  · Do not take NSAIDs or aspirin unless directed  These and similar medicines can cause irritation  If your healthcare provider says it is okay to take NSAIDs, take them with food  · Do not eat foods that cause irritation  Foods such as oranges and salsa can cause burning or pain  Eat a variety of healthy foods  Examples include fruits (not citrus), vegetables, low-fat dairy products, beans, whole-grain breads, and lean meats and fish  Try to eat small meals, and drink water with your meals  Do not eat for at least 3 hours before you go to bed  · Find ways to relax and decrease stress  Stress can increase stomach acid and make gastritis worse  Activities such as yoga, meditation, or listening to music can help you relax  Spend time with friends, or do things you enjoy  Follow up with your healthcare provider as directed: You may need ongoing tests or treatment, or referral to a gastroenterologist  Write down your questions so you remember to ask them during your visits  © Copyright [address] 2022 Information is for End User's use only and may not be sold, redistributed or otherwise used for commercial purposes  All illustrations and images included in CareNotes® are the copyrighted property of [employer]  or [facility]   The above information is an  only  It is not intended as medical advice for individual conditions or treatments  Talk to your doctor, nurse or pharmacist before following any medical regimen to see if it is safe and effective for you

## 2022-04-22 NOTE — ANESTHESIA POSTPROCEDURE EVALUATION
Post-Op Assessment Note    CV Status:  Stable  Pain Score: 0    Pain management: adequate     Mental Status:  Alert and awake   Hydration Status:  Stable and euvolemic   PONV Controlled:  Controlled   Airway Patency:  Patent      Post Op Vitals Reviewed: Yes      Staff: CRNA         No complications documented      BP   120/62   Temp      Pulse  79   Resp   16   SpO2   100

## 2022-08-20 ENCOUNTER — APPOINTMENT (OUTPATIENT)
Dept: LAB | Facility: MEDICAL CENTER | Age: 37
End: 2022-08-20
Payer: COMMERCIAL

## 2022-08-20 DIAGNOSIS — Z00.8 HEALTH EXAMINATION IN POPULATION SURVEY: ICD-10-CM

## 2022-08-20 LAB
CHOLEST SERPL-MCNC: 161 MG/DL
EST. AVERAGE GLUCOSE BLD GHB EST-MCNC: 111 MG/DL
HBA1C MFR BLD: 5.5 %
HDLC SERPL-MCNC: 61 MG/DL
LDLC SERPL CALC-MCNC: 86 MG/DL (ref 0–100)
NONHDLC SERPL-MCNC: 100 MG/DL
TRIGL SERPL-MCNC: 69 MG/DL

## 2022-08-20 PROCEDURE — 36415 COLL VENOUS BLD VENIPUNCTURE: CPT

## 2022-08-20 PROCEDURE — 80061 LIPID PANEL: CPT

## 2022-08-20 PROCEDURE — 83036 HEMOGLOBIN GLYCOSYLATED A1C: CPT

## 2022-08-31 ENCOUNTER — OFFICE VISIT (OUTPATIENT)
Dept: INTERNAL MEDICINE CLINIC | Age: 37
End: 2022-08-31
Payer: COMMERCIAL

## 2022-08-31 VITALS
SYSTOLIC BLOOD PRESSURE: 122 MMHG | HEIGHT: 67 IN | BODY MASS INDEX: 35.79 KG/M2 | OXYGEN SATURATION: 98 % | DIASTOLIC BLOOD PRESSURE: 78 MMHG | TEMPERATURE: 98 F | WEIGHT: 228 LBS | HEART RATE: 65 BPM

## 2022-08-31 DIAGNOSIS — F32.81 PMDD (PREMENSTRUAL DYSPHORIC DISORDER): ICD-10-CM

## 2022-08-31 PROCEDURE — 99213 OFFICE O/P EST LOW 20 MIN: CPT | Performed by: NURSE PRACTITIONER

## 2022-08-31 RX ORDER — ALBUTEROL SULFATE 90 UG/1
2 AEROSOL, METERED RESPIRATORY (INHALATION) EVERY 6 HOURS PRN
COMMUNITY

## 2022-08-31 RX ORDER — FLUOXETINE HYDROCHLORIDE 20 MG/1
20 CAPSULE ORAL DAILY
Qty: 90 CAPSULE | Refills: 0 | Status: SHIPPED | OUTPATIENT
Start: 2022-08-31 | End: 2022-09-01 | Stop reason: SDUPTHER

## 2022-08-31 RX ORDER — ALBUTEROL SULFATE 90 UG/1
AEROSOL, METERED RESPIRATORY (INHALATION)
COMMUNITY
Start: 2022-08-16 | End: 2022-08-31

## 2022-08-31 NOTE — PROGRESS NOTES
Assessment/Plan:    PMDD (premenstrual dysphoric disorder)  Restart Prozac  Follow up in one month or sooner if needed  BMI 35 0-35 9,adult  Discussed some dietary changes, increase daily exercise  Referral to weigh management  BMI Counseling: Body mass index is 35 71 kg/m²  The BMI is above normal  Nutrition recommendations include decreasing portion sizes, encouraging healthy choices of fruits and vegetables, decreasing fast food intake, consuming healthier snacks, limiting drinks that contain sugar, moderation in carbohydrate intake, increasing intake of lean protein, reducing intake of saturated and trans fat and reducing intake of cholesterol  Exercise recommendations include exercising 3-5 times per week  Patient referred to weight management  Rationale for BMI follow-up plan is due to patient being overweight or obese  Depression Screening and Follow-up Plan: Patient was screened for depression during today's encounter  They screened negative with a PHQ-2 score of 0  Diagnoses and all orders for this visit:    BMI 35 0-35 9,adult  -     Ambulatory Referral to Weight Management; Future    PMDD (premenstrual dysphoric disorder)  -     FLUoxetine (PROzac) 20 mg capsule; Take 1 capsule (20 mg total) by mouth daily    Other orders  -     Discontinue: albuterol (PROVENTIL HFA,VENTOLIN HFA) 90 mcg/act inhaler; INHALE 2 PUFFS EVERY 4 HOURS AS NEEDED FOR 30 DAYS  -     albuterol (ProAir HFA) 90 mcg/act inhaler; Inhale 2 puffs every 6 (six) hours as needed for wheezing          Subjective:      Patient ID: Katie Dixon is a 42 y o  female  Patient presents with brain fog, fatigue and concerns for not being able to lose weight  Recently had workup for fatigue with blood work which was unrevealing  She reports she feels fatigued she is getting 6-7 hours of sleep a night, her  tells her she sleeps through the night, but she states she wakes up unrested       She feels short tempered   Started a few months ago     Used to take prozac which helped with her moods for PMDD    Conserned about her weigth she is not able to lose weight, she is status post 2 sections and she has been trying portion control and to make better choices  The following portions of the patient's history were reviewed and updated as appropriate: allergies, current medications, past family history, past medical history, past social history, past surgical history and problem list     Review of Systems   Constitutional: Positive for fatigue  Negative for activity change, appetite change, chills, diaphoresis and fever  HENT: Negative for congestion, ear discharge, ear pain, postnasal drip, rhinorrhea, sinus pressure, sinus pain and sore throat  Eyes: Negative for pain, discharge, itching and visual disturbance  Respiratory: Negative for cough, chest tightness, shortness of breath and wheezing  Cardiovascular: Negative for chest pain, palpitations and leg swelling  Gastrointestinal: Negative for abdominal pain, constipation, diarrhea, nausea and vomiting  Endocrine: Negative for polydipsia, polyphagia and polyuria  Genitourinary: Negative for difficulty urinating, dysuria and urgency  Musculoskeletal: Negative for arthralgias, back pain and neck pain  Skin: Negative for rash and wound  Neurological: Negative for dizziness, weakness, numbness and headaches  Psychiatric/Behavioral: Negative for decreased concentration, self-injury and sleep disturbance  The patient is nervous/anxious            Past Medical History:   Diagnosis Date    ADHD (attention deficit hyperactivity disorder)     Asthma     sports induced    Chiari malformation type II ([facility] [address] )     Clostridium difficile infection     CTS (carpal tunnel syndrome)     Migraine     Papanicolaou smear for cervical cancer screening 05/2018    neg    Varicella     childhood         Current Outpatient Medications:     albuterol (ProAir HFA) 90 mcg/act inhaler, Inhale 2 puffs every 6 (six) hours as needed for wheezing, Disp: , Rfl:     cetirizine (ZyrTEC) 10 mg tablet, Take 10 mg by mouth daily, Disp: , Rfl:     clotrimazole-betamethasone (LOTRISONE) 1-0 05 % cream, Apply topically 2 (two) times a day, Disp: 45 g, Rfl: 3    FLUoxetine (PROzac) 20 mg capsule, Take 1 capsule (20 mg total) by mouth daily, Disp: 90 capsule, Rfl: 0    pantoprazole (PROTONIX) 40 mg tablet, Take 1 tablet (40 mg total) by mouth 2 (two) times a day before meals, Disp: 180 tablet, Rfl: 0    No Known Allergies    Social History   Past Surgical History:   Procedure Laterality Date    ACHILLES TENDON LENGTHENING       SECTION      [place]  DELIVERY ONLY N/A 2016    Procedure:  SECTION ();   Surgeon: Evelina Bennett MD;  [facility];  Service: Obstetrics    [place]  DELIVERY ONLY N/A 2021    Procedure: Marlyce Pear () REPEAT;  Surgeon: Evelina Bennett MD;  [facility];  Service: Obstetrics    [place] LAP,CHOLECYSTECTOMY N/A 2019    Procedure: CHOLECYSTECTOMY LAPAROSCOPIC;  Surgeon: Claudia Floyd DO;  [facility];  Service: General    TUBAL LIGATION Bilateral 2021    Procedure: LIGATION/COAGULATION TUBAL;  Surgeon: Evelina Bennett MD;  [facility];  Service: Obstetrics    WISDOM TOOTH EXTRACTION       Family History   Problem Relation Age of Onset    Hypothyroidism Mother     Rheumatic fever Mother     Hypertension Father     Depression Father     Alzheimer's disease Father     ADD / ADHD Brother     Diabetes Maternal Grandfather     Hearing loss Paternal Grandfather     Diabetes Paternal Grandfather     Hypertension Paternal Grandfather        Objective:  /78 (BP Location: Left arm, Patient Position: Sitting, Cuff Size: Large)   Pulse 65   Temp 98 °F (36 7 °C)   Ht 5' 7" (1 702 m)   Wt 103 kg (228 lb)   SpO2 98%   BMI 35 71 kg/m²     Recent Results (from the past 1344 hour(s)) Hemoglobin A1C    Collection Time: 08/20/22  7:59 AM   Result Value Ref Range    Hemoglobin A1C 5 5 Normal 3 8-5 6%; PreDiabetic 5 7-6 4%; Diabetic >=6 5%; Glycemic control for adults with diabetes <7 0% %     mg/dl   Lipid panel    Collection Time: 08/20/22  7:59 AM   Result Value Ref Range    Cholesterol 161 See Comment mg/dL    Triglycerides 69 See Comment mg/dL    HDL, Direct 61 >=50 mg/dL    LDL Calculated 86 0 - 100 mg/dL    Non-HDL-Chol (CHOL-HDL) 100 mg/dl            Physical Exam  Constitutional:       General: She is not in acute distress  Appearance: She is well-developed  She is not diaphoretic  HENT:      Head: Normocephalic and atraumatic  Right Ear: External ear normal       Left Ear: External ear normal       Nose: Nose normal       Mouth/Throat:      Pharynx: No oropharyngeal exudate  Eyes:      General:         Right eye: No discharge  Left eye: No discharge  Conjunctiva/sclera: Conjunctivae normal       Pupils: Pupils are equal, round, and reactive to light  Neck:      Thyroid: No thyromegaly  Cardiovascular:      Rate and Rhythm: Normal rate and regular rhythm  Heart sounds: Normal heart sounds  No murmur heard  No friction rub  No gallop  Pulmonary:      Effort: Pulmonary effort is normal  No respiratory distress  Breath sounds: Normal breath sounds  No stridor  No wheezing or rales  Abdominal:      General: Bowel sounds are normal  There is no distension  Palpations: Abdomen is soft  Tenderness: There is no abdominal tenderness  Musculoskeletal:      Cervical back: Normal range of motion and neck supple  Lymphadenopathy:      Cervical: No cervical adenopathy  Skin:     General: Skin is warm and dry  Findings: No erythema or rash  Neurological:      Mental Status: She is alert and oriented to person, place, and time  Psychiatric:         Behavior: Behavior normal          Thought Content:  Thought content normal  Judgment: Judgment normal

## 2022-09-01 DIAGNOSIS — F32.81 PMDD (PREMENSTRUAL DYSPHORIC DISORDER): ICD-10-CM

## 2022-09-01 RX ORDER — FLUOXETINE HYDROCHLORIDE 20 MG/1
20 CAPSULE ORAL DAILY
Qty: 90 CAPSULE | Refills: 0 | Status: SHIPPED | OUTPATIENT
Start: 2022-09-01

## 2022-09-01 NOTE — TELEPHONE ENCOUNTER
Pt called saying the pharmacy that her fluoxetine was sent to called her saying that her insurance wouldn't cover it and that it needed to be sent to [facility]

## 2022-10-14 ENCOUNTER — OFFICE VISIT (OUTPATIENT)
Dept: GASTROENTEROLOGY | Facility: AMBULARY SURGERY CENTER | Age: 37
End: 2022-10-14
Payer: COMMERCIAL

## 2022-10-14 ENCOUNTER — APPOINTMENT (OUTPATIENT)
Dept: LAB | Facility: AMBULARY SURGERY CENTER | Age: 37
End: 2022-10-14
Attending: INTERNAL MEDICINE
Payer: COMMERCIAL

## 2022-10-14 VITALS
HEIGHT: 67 IN | WEIGHT: 233 LBS | RESPIRATION RATE: 18 BRPM | SYSTOLIC BLOOD PRESSURE: 112 MMHG | DIASTOLIC BLOOD PRESSURE: 64 MMHG | BODY MASS INDEX: 36.57 KG/M2

## 2022-10-14 DIAGNOSIS — K92.1 BLACK STOOL: ICD-10-CM

## 2022-10-14 DIAGNOSIS — K21.9 GASTROESOPHAGEAL REFLUX DISEASE, UNSPECIFIED WHETHER ESOPHAGITIS PRESENT: ICD-10-CM

## 2022-10-14 DIAGNOSIS — K62.5 RECTAL BLEEDING: Primary | ICD-10-CM

## 2022-10-14 DIAGNOSIS — K62.5 RECTAL BLEEDING: ICD-10-CM

## 2022-10-14 DIAGNOSIS — K20.0 EOSINOPHILIC ESOPHAGITIS: ICD-10-CM

## 2022-10-14 LAB
ALBUMIN SERPL BCP-MCNC: 3.7 G/DL (ref 3.5–5)
ALP SERPL-CCNC: 74 U/L (ref 46–116)
ALT SERPL W P-5'-P-CCNC: 24 U/L (ref 12–78)
ANION GAP SERPL CALCULATED.3IONS-SCNC: 4 MMOL/L (ref 4–13)
AST SERPL W P-5'-P-CCNC: 17 U/L (ref 5–45)
BILIRUB SERPL-MCNC: 0.45 MG/DL (ref 0.2–1)
BUN SERPL-MCNC: 13 MG/DL (ref 5–25)
CALCIUM SERPL-MCNC: 9 MG/DL (ref 8.3–10.1)
CHLORIDE SERPL-SCNC: 109 MMOL/L (ref 96–108)
CO2 SERPL-SCNC: 25 MMOL/L (ref 21–32)
CREAT SERPL-MCNC: 1 MG/DL (ref 0.6–1.3)
ERYTHROCYTE [DISTWIDTH] IN BLOOD BY AUTOMATED COUNT: 14.6 % (ref 11.6–15.1)
GFR SERPL CREATININE-BSD FRML MDRD: 71 ML/MIN/1.73SQ M
GLUCOSE P FAST SERPL-MCNC: 91 MG/DL (ref 65–99)
HCT VFR BLD AUTO: 40.2 % (ref 34.8–46.1)
HGB BLD-MCNC: 12.4 G/DL (ref 11.5–15.4)
MCH RBC QN AUTO: 24.8 PG (ref 26.8–34.3)
MCHC RBC AUTO-ENTMCNC: 30.8 G/DL (ref 31.4–37.4)
MCV RBC AUTO: 80 FL (ref 82–98)
PLATELET # BLD AUTO: 277 THOUSANDS/UL (ref 149–390)
PMV BLD AUTO: 11.6 FL (ref 8.9–12.7)
POTASSIUM SERPL-SCNC: 3.8 MMOL/L (ref 3.5–5.3)
PROT SERPL-MCNC: 7.9 G/DL (ref 6.4–8.4)
RBC # BLD AUTO: 5 MILLION/UL (ref 3.81–5.12)
SODIUM SERPL-SCNC: 138 MMOL/L (ref 135–147)
WBC # BLD AUTO: 6.37 THOUSAND/UL (ref 4.31–10.16)

## 2022-10-14 PROCEDURE — 85027 COMPLETE CBC AUTOMATED: CPT

## 2022-10-14 PROCEDURE — 36415 COLL VENOUS BLD VENIPUNCTURE: CPT

## 2022-10-14 PROCEDURE — 80053 COMPREHEN METABOLIC PANEL: CPT

## 2022-10-14 PROCEDURE — 99214 OFFICE O/P EST MOD 30 MIN: CPT | Performed by: INTERNAL MEDICINE

## 2022-10-14 NOTE — LETTER
October 14, 2022     [place], [place]  [id number]    Patient: Jonel Portillo   YOB: 1985   Date of Visit: 10/14/2022       Dear Dr Sincere Ledbetter: Thank you for referring Kim Hu to me for evaluation  Below are my notes for this consultation  If you have questions, please do not hesitate to call me  I look forward to following your patient along with you  Sincerely,        Jesse Vuong MD        CC: No Recipients  Jesse Vuong MD  10/14/2022  6:08 PM  Sign when Signing Visit  [facility]  Progress Note - Jose Juan Negrete 42 y o  female MRN: [id number]    Unit/Bed#:  Encounter: [id number]    Assessment/Plan:    1  GERD-symptoms controlled with pantoprazole 40 mg twice daily  2  EOE-all symptoms of acid reflux are well controlled, has occasional symptoms of dysphagia with ingestion bread chicken or harder foods   -was evaluated by Allergy, and reports that she was prescribed what sounds like fluticasone however unable to pick it up due to the cost of the prescription  She reports that she is going back to see them  She was recommended complete abstinence from lactose although patient tells me that she was not found to be allergic to any of the 6 foods  Will need to obtain the records from allergy at this time  -patient has not been on a complete lactose-free diet therefore encouraged compliance   -if patient continues to have symptoms of dysphagia or worsening dysphagia, would recommend repeat EGD and also discussed with patient that she may need to ultimately be switched to fluticasone  3  Black tarry stools-reports black tarry stools over the past few days , no recent NSAID use no hematemesis, coffee-ground emesis  No epigastric abdominal pain  Will check CBC  Denies use of Pepto-Bismol or iron supplements  Does not report any Kaopectate      4  Change in bowel habits with alternating loose stools with constipation since her recent childbirth and tubal ligation-may be secondary to IBS however given reports of black stools, prior EGD without any evidence of peptic ulcer disease-patient being on b i d  PPI, can plan for colonoscopy to assess for right-sided AVMs or lesions contributing to black stools  Will also CBC soon  Subjective: This is a 66-year-old very pleasant female with history of GERD, EOE, on pantoprazole 40 mg twice daily who presents for follow-up  Patient reports that she has been feeling well from standpoint of acid reflux symptoms however occasionally still has symptoms of dysphagia with certain foods  Patient was evaluated by ENT  I do not have the consultation report however patient tells me that she was not noted to be allergic to any of the 6 foods  Patient was subsequently prescribed fluticasone  She was also recommended lactose-free diet which she has not been completely following  She reports that she was not able to start fluticasone as it was expensive  She tells me that since giving birth to her child 16 months ago, she has had change in bowel habits with alternating diarrhea with constipation  She also reports increased stress  Furthermore, she reports that she has been having black tarry stools for the past few weeks but this is inconsistent, she reports that she only has black tarry stools in the evenings  She denies any new medications, no recent food dyes  Denies any use of Kaopectate or Pepto-Bismol  Denies any iron use  No abdominal pain or hematochezia  She has never had a colonoscopy  No family history of GI malignancy  Objective:     Vitals: Blood pressure 112/64, resp  rate 18, height 5' 7" (1 702 m), weight 106 kg (233 lb), currently breastfeeding  ,Body mass index is 36 49 kg/m²      [unfilled]    Physical Exam:    GEN: wn/wd, NAD  HEENT: MMM, no cervical or supraclavicular LAD, anciteric  CV: RRR, no m/r/g  CHEST: CTA b/l, no w/r/r  ABD: +BS, soft, NT/ND, no hepatosplenomegaly  EXT: no c/c/e  SKIN: no rashes  NEURO: aaox3      Invasive Devices  Report    None                         Lab, Imaging and other studies:     Appointment on 10/14/2022   Component Date Value   • WBC 10/14/2022 6 37    • RBC 10/14/2022 5 00    • Hemoglobin 10/14/2022 12 4    • Hematocrit 10/14/2022 40 2    • MCV 10/14/2022 80 (A)   • MCH 10/14/2022 24 8 (A)   • MCHC 10/14/2022 30 8 (A)   • RDW 10/14/2022 14 6    • Platelets 86/82/7542 277    • MPV 10/14/2022 11 6    • Sodium 10/14/2022 138    • Potassium 10/14/2022 3 8    • Chloride 10/14/2022 109 (A)   • CO2 10/14/2022 25    • ANION GAP 10/14/2022 4    • BUN 10/14/2022 13    • Creatinine 10/14/2022 1 00    • Glucose, Fasting 10/14/2022 91    • Calcium 10/14/2022 9 0    • AST 10/14/2022 17    • ALT 10/14/2022 24    • Alkaline Phosphatase 10/14/2022 74    • Total Protein 10/14/2022 7 9    • Albumin 10/14/2022 3 7    • Total Bilirubin 10/14/2022 0 45    • eGFR 10/14/2022 71          I have personally reviewed pertinent films in PACS    No current facility-administered medications for this visit

## 2022-10-14 NOTE — PATIENT INSTRUCTIONS
Scheduled date of colonoscopy (as of today):12/2/2022  Physician performing colonoscopy:DR NORWOOD  Location of colonoscopy:[facility]  Bowel prep reviewed with patient:CLENPIQ SAMPLE GIVEN  Instructions reviewed with patient by:ALY JAIN  Clearances:

## 2022-10-14 NOTE — PROGRESS NOTES
[facility]  Progress Note - Rod Christine 42 y o  female MRN: [id number]    Unit/Bed#:  Encounter: [id number]    Assessment/Plan:    1  GERD-symptoms controlled with pantoprazole 40 mg twice daily  2  EOE-all symptoms of acid reflux are well controlled, has occasional symptoms of dysphagia with ingestion bread chicken or harder foods   -was evaluated by Allergy, and reports that she was prescribed what sounds like fluticasone however unable to pick it up due to the cost of the prescription  She reports that she is going back to see them  She was recommended complete abstinence from lactose although patient tells me that she was not found to be allergic to any of the 6 foods  Will need to obtain the records from allergy at this time  -patient has not been on a complete lactose-free diet therefore encouraged compliance   -if patient continues to have symptoms of dysphagia or worsening dysphagia, would recommend repeat EGD and also discussed with patient that she may need to ultimately be switched to fluticasone  3  Black tarry stools-reports black tarry stools over the past few days , no recent NSAID use no hematemesis, coffee-ground emesis  No epigastric abdominal pain  Will check CBC  Denies use of Pepto-Bismol or iron supplements  Does not report any Kaopectate  4  Change in bowel habits with alternating loose stools with constipation since her recent childbirth and tubal ligation-may be secondary to IBS however given reports of black stools, prior EGD without any evidence of peptic ulcer disease-patient being on b i d  PPI, can plan for colonoscopy to assess for right-sided AVMs or lesions contributing to black stools  Will also CBC soon  Subjective: This is a 24-year-old very pleasant female with history of GERD, EOE, on pantoprazole 40 mg twice daily who presents for follow-up    Patient reports that she has been feeling well from standpoint of acid reflux symptoms however occasionally still has symptoms of dysphagia with certain foods  Patient was evaluated by ENT  I do not have the consultation report however patient tells me that she was not noted to be allergic to any of the 6 foods  Patient was subsequently prescribed fluticasone  She was also recommended lactose-free diet which she has not been completely following  She reports that she was not able to start fluticasone as it was expensive  She tells me that since giving birth to her child 16 months ago, she has had change in bowel habits with alternating diarrhea with constipation  She also reports increased stress  Furthermore, she reports that she has been having black tarry stools for the past few weeks but this is inconsistent, she reports that she only has black tarry stools in the evenings  She denies any new medications, no recent food dyes  Denies any use of Kaopectate or Pepto-Bismol  Denies any iron use  No abdominal pain or hematochezia  She has never had a colonoscopy  No family history of GI malignancy  Objective:     Vitals: Blood pressure 112/64, resp  rate 18, height 5' 7" (1 702 m), weight 106 kg (233 lb), currently breastfeeding  ,Body mass index is 36 49 kg/m²      [unfilled]    Physical Exam:    GEN: wn/wd, NAD  HEENT: MMM, no cervical or supraclavicular LAD, anciteric  CV: RRR, no m/r/g  CHEST: CTA b/l, no w/r/r  ABD: +BS, soft, NT/ND, no hepatosplenomegaly  EXT: no c/c/e  SKIN: no rashes  NEURO: aaox3      Invasive Devices  Report    None                         Lab, Imaging and other studies:     Appointment on 10/14/2022   Component Date Value   • WBC 10/14/2022 6 37    • RBC 10/14/2022 5 00    • Hemoglobin 10/14/2022 12 4    • Hematocrit 10/14/2022 40 2    • MCV 10/14/2022 80 (A)   • MCH 10/14/2022 24 8 (A)   • MCHC 10/14/2022 30 8 (A)   • RDW 10/14/2022 14 6    • Platelets 94/15/0623 277    • MPV 10/14/2022 11 6    • Sodium 10/14/2022 138    • Potassium 10/14/2022 3 8 • Chloride 10/14/2022 109 (A)   • CO2 10/14/2022 25    • ANION GAP 10/14/2022 4    • BUN 10/14/2022 13    • Creatinine 10/14/2022 1 00    • Glucose, Fasting 10/14/2022 91    • Calcium 10/14/2022 9 0    • AST 10/14/2022 17    • ALT 10/14/2022 24    • Alkaline Phosphatase 10/14/2022 74    • Total Protein 10/14/2022 7 9    • Albumin 10/14/2022 3 7    • Total Bilirubin 10/14/2022 0 45    • eGFR 10/14/2022 71          I have personally reviewed pertinent films in PACS    No current facility-administered medications for this visit

## 2022-12-02 ENCOUNTER — ANESTHESIA EVENT (OUTPATIENT)
Dept: GASTROENTEROLOGY | Facility: AMBULARY SURGERY CENTER | Age: 37
End: 2022-12-02

## 2022-12-02 ENCOUNTER — HOSPITAL ENCOUNTER (OUTPATIENT)
Dept: GASTROENTEROLOGY | Facility: AMBULARY SURGERY CENTER | Age: 37
Setting detail: OUTPATIENT SURGERY
End: 2022-12-02
Attending: INTERNAL MEDICINE

## 2022-12-02 ENCOUNTER — ANESTHESIA (OUTPATIENT)
Dept: GASTROENTEROLOGY | Facility: AMBULARY SURGERY CENTER | Age: 37
End: 2022-12-02

## 2022-12-02 VITALS
OXYGEN SATURATION: 100 % | RESPIRATION RATE: 18 BRPM | TEMPERATURE: 96.9 F | HEART RATE: 61 BPM | HEIGHT: 67 IN | DIASTOLIC BLOOD PRESSURE: 62 MMHG | WEIGHT: 230 LBS | SYSTOLIC BLOOD PRESSURE: 105 MMHG | BODY MASS INDEX: 36.1 KG/M2

## 2022-12-02 DIAGNOSIS — K92.1 BLACK STOOL: ICD-10-CM

## 2022-12-02 RX ORDER — SODIUM CHLORIDE, SODIUM LACTATE, POTASSIUM CHLORIDE, CALCIUM CHLORIDE 600; 310; 30; 20 MG/100ML; MG/100ML; MG/100ML; MG/100ML
INJECTION, SOLUTION INTRAVENOUS CONTINUOUS PRN
Status: DISCONTINUED | OUTPATIENT
Start: 2022-12-02 | End: 2022-12-02

## 2022-12-02 RX ORDER — PROPOFOL 10 MG/ML
INJECTION, EMULSION INTRAVENOUS AS NEEDED
Status: DISCONTINUED | OUTPATIENT
Start: 2022-12-02 | End: 2022-12-02

## 2022-12-02 RX ADMIN — PROPOFOL 20 MG: 10 INJECTION, EMULSION INTRAVENOUS at 10:20

## 2022-12-02 RX ADMIN — PROPOFOL 50 MG: 10 INJECTION, EMULSION INTRAVENOUS at 10:28

## 2022-12-02 RX ADMIN — PROPOFOL 50 MG: 10 INJECTION, EMULSION INTRAVENOUS at 10:24

## 2022-12-02 RX ADMIN — PROPOFOL 50 MG: 10 INJECTION, EMULSION INTRAVENOUS at 10:26

## 2022-12-02 RX ADMIN — PROPOFOL 50 MG: 10 INJECTION, EMULSION INTRAVENOUS at 10:32

## 2022-12-02 RX ADMIN — PROPOFOL 50 MG: 10 INJECTION, EMULSION INTRAVENOUS at 10:22

## 2022-12-02 RX ADMIN — SODIUM CHLORIDE, SODIUM LACTATE, POTASSIUM CHLORIDE, AND CALCIUM CHLORIDE: .6; .31; .03; .02 INJECTION, SOLUTION INTRAVENOUS at 09:43

## 2022-12-02 RX ADMIN — PROPOFOL 80 MG: 10 INJECTION, EMULSION INTRAVENOUS at 10:18

## 2022-12-02 RX ADMIN — PROPOFOL 50 MG: 10 INJECTION, EMULSION INTRAVENOUS at 10:30

## 2022-12-02 NOTE — ANESTHESIA PREPROCEDURE EVALUATION
Procedure:  COLONOSCOPY    Relevant Problems   CARDIO   (+) Chronic migraine without aura without status migrainosus, not intractable   (+) Gestational hypertension      GI/HEPATIC   (+) Gastroesophageal reflux disease      GYN   (+) 38 weeks gestation of pregnancy   (+) AMA (advanced maternal age) multigravida 35+      MUSCULOSKELETAL   (+) Back pain   (+) Patellofemoral arthritis of right knee      NEURO/PSYCH   (+) Chronic migraine without aura without status migrainosus, not intractable   (+) History of pre-eclampsia   (+) Occipital headache   (+) PMDD (premenstrual dysphoric disorder)   (+) Paresthesia of left arm   (+) Paresthesia of right arm      PULMONARY   (+) Mild persistent asthma without complication      Nervous and Auditory   (+) Chiari malformation type I (HCC)      Other   (+) Bilateral occipital neuralgia        Physical Exam    Airway    Mallampati score: II  TM Distance: >3 FB  Neck ROM: full     Dental       Cardiovascular  Cardiovascular exam normal    Pulmonary  Pulmonary exam normal     Other Findings        Anesthesia Plan  ASA Score- 2     Anesthesia Type- IV sedation with anesthesia with ASA Monitors  Additional Monitors:   Airway Plan:           Plan Factors-Exercise tolerance (METS): >4 METS  Chart reviewed  EKG reviewed  Imaging results reviewed  Existing labs reviewed  Patient summary reviewed  Induction- intravenous  Postoperative Plan- Plan for postoperative opioid use  Planned trial extubation    Informed Consent- Anesthetic plan and risks discussed with patient  I personally reviewed this patient with the CRNA  Discussed and agreed on the Anesthesia Plan with the CRNA  Rebeka Dunn

## 2022-12-02 NOTE — H&P
History and Physical - [facility]  Radha Alexander 42 y o  female MRN: [id number]    HPI: Radha Alexander is a 42y o  year old female who presents for evaluation of change in bowel habits and rectal bleeding  Review of Systems    Historical Information   Past Medical History:   Diagnosis Date   • ADHD (attention deficit hyperactivity disorder)    • Anesthesia complication     needed a nebulizer Tx after cholecystectomy-only happened with this surgery   • Asthma     sports induced   • Black stools     with abdominal pain   • Chiari malformation type II ([address] )    • Clostridium difficile infection    • CTS (carpal tunnel syndrome)    • Eosinophilic esophagitis    • GERD (gastroesophageal reflux disease)    • Migraine    • Papanicolaou smear for cervical cancer screening 2018    neg   • Varicella     childhood     Past Surgical History:   Procedure Laterality Date   • ACHILLES TENDON LENGTHENING Right    •  SECTION      x2   • [place]  DELIVERY ONLY N/A 2016    Procedure:  SECTION ();   Surgeon: George Patel MD;  [facility];  Service: Obstetrics   • [place]  DELIVERY ONLY N/A 2021    Procedure:  SECTION () REPEAT;  Surgeon: George Patel MD;  [facility];  Service: Obstetrics   • [place] LAP,CHOLECYSTECTOMY N/A 2019    Procedure: CHOLECYSTECTOMY LAPAROSCOPIC;  Surgeon: Yadira Lobo DO;  [facility];  Service: General   • TUBAL LIGATION Bilateral 2021    Procedure: LIGATION/COAGULATION TUBAL;  Surgeon: George Patel MD;  [facility];  Service: Obstetrics   • WISDOM TOOTH EXTRACTION       Social History   Social History     Substance and Sexual Activity   Alcohol Use Yes    Comment: social     Social History     Substance and Sexual Activity   Drug Use No     Social History     Tobacco Use   Smoking Status Never   Smokeless Tobacco Never     Family History   Problem Relation Age of Onset   • Hypothyroidism Mother    • Rheumatic fever Mother    • Hypertension Father    • Depression Father    • Alzheimer's disease Father    • ADD / ADHD Brother    • Diabetes Maternal Grandfather    • Hearing loss Paternal Grandfather    • Diabetes Paternal Grandfather    • Hypertension Paternal Grandfather        Meds/Allergies     (Not in a hospital admission)      No Known Allergies    Objective     LMP 12/02/2022 (Exact Date) Comment: B/L Salpingectomy      PHYSICAL EXAM    Gen: NAD  CV: RRR  CHEST: Clear  ABD: soft, NT/ND  EXT: no edema  Neuro: AAO      ASSESSMENT/PLAN:  This is a 42y o  year old female here for change in bowel habits and rectal bleeding  PLAN:   Procedure:  Colonoscopy

## 2022-12-02 NOTE — ANESTHESIA POSTPROCEDURE EVALUATION
Post-Op Assessment Note    CV Status:  Stable  Pain Score: 0    Pain management: adequate     Mental Status:  Alert and awake   Hydration Status:  Euvolemic   PONV Controlled:  Controlled   Airway Patency:  Patent      Post Op Vitals Reviewed: Yes      Staff: CRNA         No notable events documented      BP   116/64   Temp     Pulse 79   Resp   15   SpO2   99

## 2023-01-03 NOTE — PROGRESS NOTES
1/3/23 - Pt here for follow up.  Colonoscopy on 7/11/22 was normal.  BMs 2x/d, soft, no GI bleed.  No abd pain, N/V.  Lost 2#.  On Imuran. *************************** 6/3/22 - Pt here for follow up.  Doing well on Imuran.  BMs 2-3x/d, soft, no GI bleed.  No abd pain, N/V.  Has gained 28# since last visit, but has maintained activity.  LFTs reviewed, and U/S showed fatty liver. *********************** 11/21/21 - Pt here for routine Crohn's follow up.   Doing well on Imuran.  BMs regular, 2-3x/d, soft, formed, occ oatmeal.  No GI bleed.  No abd pain, N/V, weight loss. Assessment:  1  Chronic pain syndrome    2  Bilateral occipital neuralgia        Plan:  1  The patient was reporting and 90% improvement of her occipital headaches status post bilateral greater occipital nerve blocks on February 20, 2020 with Dr Syl Wagner  I discussed with the patient that since there has been moderate to significant improvement in the pain symptoms, we will hold off on any repeat injections at this point in time  However, I reviewed with the patient that if their symptoms should return or worsen,  they should call our office to schedule to discuss repeating the injection  2  The patient may continue gabapentin 300 mg q h s     The patient states she does not need a refill this medication at this time  3  The patient will follow-up in 12 weeks for medication prescription refill and reevaluation  The patient was advised to contact the office should their symptoms worsen in the interim  The patient was agreeable and verbalized an understanding  [employer] software was used to dictate this note  It may contain errors with dictating incorrect words or incorrect spelling  Please contact the provider directly with any questions  History of Present Illness: The patient is a 37 y o  female last seen on 2/20/20 who presents for a follow up office visit in regards to chronic occipital headaches  The patient does have some neck tension and tightness, however denies radicular symptoms into the upper extremities  She denies any auditory symptoms  She denies bowel or bladder incontinence or balance issues  MRI of the cervical spine reveals very slight disc bulge at C5-6 without any significant central or foraminal stenosis, otherwise unremarkable  The patient does follow with Neurology and has also been evaluated by Neurosurgery who did not feel surgical intervention was warranted  The patient currently rates her pain a 2/10 on the numeric pain rating scale    She states her pain is occasional in nature and bothersome at night  She characterizes the pain as sharp and shooting  Current pain medications includes:  Gabapentin 300 mg q h s     The patient reports that this regimen is providing 90% pain relief  The patient is reporting sleepiness from this pain medication regimen  I have personally reviewed and/or updated the patient's past medical history, past surgical history, family history, social history, current medications, allergies, and vital signs today  Review of Systems:    Review of Systems      Past Medical History:   Diagnosis Date    ADHD (attention deficit hyperactivity disorder)     Asthma     sports induced    Chiari malformation type II ([facility] [address] )     Clostridium difficile infection     Varicella     childhood       Past Surgical History:   Procedure Laterality Date    ACHILLES TENDON LENGTHENING      [place]  DELIVERY ONLY N/A 2016    Procedure:  SECTION ();   Surgeon: George Patel MD;  [facility] ;  Service: Obstetrics    [place] LAP,CHOLECYSTECTOMY N/A 2019    Procedure: CHOLECYSTECTOMY LAPAROSCOPIC;  Surgeon: Yadira Lobo DO;  [facility];  Service: General    WISDOM TOOTH EXTRACTION         Family History   Problem Relation Age of Onset    Hypothyroidism Mother     Rheumatic fever Mother     Hypertension Father     Depression Father     Alzheimer's disease Father     ADD / ADHD Brother     Hearing loss Paternal Grandfather     Diabetes Paternal Grandfather     Hypertension Paternal Grandfather        Social History     Occupational History    Not on file   Tobacco Use    Smoking status: Never Smoker    Smokeless tobacco: Never Used   Substance and Sexual Activity    Alcohol use: Yes     Comment: social    Drug use: No    Sexual activity: Yes     Partners: Male     Birth control/protection: OCP         Current Outpatient Medications:     cyclobenzaprine (FLEXERIL) 5 mg tablet, Take 1 tablet (5 mg total) by mouth daily at bedtime, Disp: 10 tablet, Rfl: 0    Fluoxetine HCl, PMDD, 10 MG CAPS, Take 1 capsule (10 mg total) by mouth daily, Disp: 30 capsule, Rfl: 5    fluticasone (FLONASE) 50 mcg/act nasal spray, 1 spray into each nostril daily, Disp: 16 g, Rfl: 0    fluticasone-vilanterol (BREO ELLIPTA) 100-25 mcg/inh inhaler, Inhale 1 puff daily Rinse mouth after use  (Patient taking differently: Inhale 1 puff as needed Rinse mouth after use  ), Disp: 2 Inhaler, Rfl: 0    gabapentin (NEURONTIN) 100 mg capsule, Take 3 capsules (300 mg total) by mouth daily at bedtime, Disp: 90 capsule, Rfl: 1    ibuprofen (MOTRIN) 200 mg tablet, Take by mouth every 6 (six) hours as needed for mild pain, Disp: , Rfl:     multivitamin (THERAGRAN) TABS, Take 1 tablet by mouth daily, Disp: , Rfl:     norethindrone-ethinyl estradiol (JUNEL FE 1/20) 1-20 MG-MCG per tablet, Take 1 tablet by mouth daily, Disp: 90 tablet, Rfl: 4    ondansetron (ZOFRAN) 4 mg tablet, Take 1 tablet (4 mg total) by mouth every 8 (eight) hours as needed for nausea or vomiting, Disp: 20 tablet, Rfl: 0    traMADol (ULTRAM) 50 mg tablet, Take 1 tablet (50 mg total) by mouth every 8 (eight) hours as needed for moderate pain, Disp: 21 tablet, Rfl: 0    No Known Allergies    Physical Exam:    /72   Pulse 59   Ht 5' 8 5" (1 74 m)   Wt 103 kg (227 lb)   BMI 34 01 kg/m²     Constitutional:normal, well developed, well nourished, alert, in no distress and non-toxic and no overt pain behavior  Eyes:anicteric  HEENT:grossly intact  Neck:supple, symmetric, trachea midline and no masses   Pulmonary:even and unlabored  Cardiovascular:No edema or pitting edema present  Skin:Normal without rashes or lesions and well hydrated  Psychiatric:Mood and affect appropriate  Neurologic:Cranial Nerves II-XII grossly intact  Musculoskeletal:normal gait      Imaging  No orders to display     MRI CERVICAL SPINE WITHOUT CONTRAST     INDICATION: Q07 00:  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 hydrocephalus  R51: Headache      COMPARISON:  None      TECHNIQUE:  Sagittal T1, sagittal T2, sagittal inversion recovery, axial T2, axial  2D merge     IMAGE QUALITY:  Diagnostic     FINDINGS:     ALIGNMENT:  Normal alignment of the cervical spine  No compression fracture  No subluxation  No scoliosis      MARROW SIGNAL:  Normal marrow signal is identified within the visualized bony structures  No discrete marrow lesion      CERVICAL AND VISUALIZED THORACIC CORD:  Normal signal within the visualized cord      PREVERTEBRAL AND PARASPINAL SOFT TISSUES:  Normal      VISUALIZED POSTERIOR FOSSA:  Cerebellar tonsils descend 4 mm below the foramen magnum without obvious crowding at foramen magnum  No evidence of cervical spinal cord syrinx      CERVICAL DISC SPACES:     C2-C3:  Normal      C3-C4:  Normal      C4-C5:  Normal      C5-C6:  Minimal annular bulge without central or foraminal narrowing      C6-C7:  Normal      C7-T1:  Normal      UPPER THORACIC DISC SPACES:  Normal      IMPRESSION:     Cerebellar tonsils descend 4 mm below the foramen magnum without evidence of crowding at the foramen magnum  No evidence of cervical spinal cord syrinx      Minimal degenerative changes           No orders of the defined types were placed in this encounter

## 2023-02-28 NOTE — ADDENDUM NOTE
Addended by: Lisha Barclay on: 6/15/2018 04:20 PM     Modules accepted: Orders Nsaids Counseling: NSAID Counseling: I discussed with the patient that NSAIDs should be taken with food. Prolonged use of NSAIDs can result in the development of stomach ulcers.  Patient advised to stop taking NSAIDs if abdominal pain occurs.  The patient verbalized understanding of the proper use and possible adverse effects of NSAIDs.  All of the patient's questions and concerns were addressed.

## 2023-04-17 NOTE — ED NOTES
[facility]  Discharge- Morillocristin Jean Baptiste 1960, 57 y o  female MRN: [id number]  Unit/Bed#: S -01 Encounter: [id number]  Primary Care Provider: No primary care provider on file  Date and time admitted to hospital: 4/15/2023  1:03 AM    * Type 2 diabetes mellitus with hyperglycemia, without long-term current use of insulin [facility])  Assessment & Plan  Lab Results   Component Value Date    HGBA1C >14 0 (H) 04/15/2023     · Presents with feeling weak  Notes soft blood pressures at home  Previously was on Metformin, but was stopped as blood sugars improved following weight loss  Unfortunately has lost PCP care as they have now retired  · Patient has been transition from insulin drip to subcu insulin  · Continue patient on insulin Lantus and lispro 3 times daily with meals  Appreciate endocrinology recommendation  · Continue patient on low-dose sliding scale insulin with Accu-Chek 4 times daily  · Counselled on diet  Patient getting Insulin education  · Patient discharged on 25 units tresiba in the morning and humalog Kwikpen U200 5 units three times a day     Hyponatremia  Assessment & Plan  · Pseudohyponatremia  · Improved  Benign essential hypertension  Assessment & Plan  · Hold all other BP meds  Cont  Metorpolol succinate as per cards recs  Atrial fibrillation ([facility] [address] )  Assessment & Plan  PAF on Metoprolol  ANA (acute kidney injury) (HCC)-resolved as of 4/17/2023  Assessment & Plan  · Creatinine 1 51, prior 0 75 (2019)  · Likely in setting of hyperglycemia, ACEI-HCTZ, poor PO intake   · Improved creatinine     Lab Results   Component Value Date    CREATININE 1 18 04/15/2023           Medical Problems     Resolved Problems  Date Reviewed: 4/16/2023          Resolved    ANA (acute kidney injury) ([facility] [address] ) 4/17/2023     Resolved by  Dallas Aguirre DO        Discharging Resident: Dallas Aguirre DO  Discharging Attending: Ely Duke MD  PCP: No Patient transported to Meghan Romero, RN  01/19/19 0124 primary care provider on file  Admission Date:   Admission Orders (From admission, onward)     Ordered        04/15/23 0419  Place in Observation  Once                      Discharge Date: 04/17/23    Consultations During Hospital Stay:  · endocrinology    Procedures Performed:   · none    Significant Findings / Test Results:   · Glucose >700    Incidental Findings:   · none    Test Results Pending at Discharge (will require follow up):  · none     Outpatient Tests Requested:  · none    Complications:  none    Reason for Admission: hyperglycemia    Hospital Course:   Delfina Amin is a 57 y o  female patient who originally presented to the hospital on 4/15/2023 due to generalized weakness, fatigue, and low pressured for one week prior  Labs were drawn which showed patient had an ANA and glucose of 721  Patient was given IV fluids and 5 Units IV insulin  Endocrinology was consulted and they initiated insulin daily  Patient was treated with Lantus in the mornings and Humalog three times a day with meals and sliding scale insulin  Patient initial EKG showed st elevations concerning for pericarditis but patient did not have clinical symptoms  Patient lisinopril and hydrochlorothiazide were discontinued for low BP  Endocrinology helped with changing insulin regimen throughout hospital course  Patient was discharge home with 25 units tresiba in the morning and 5 units of humalog U200 three times a day with meals  Patient was given diet counseling on Saturday and given discharge instructions about diet and diabetic management  Patient was given endocrinology and dietician referrals on discharge  Patient has cardiology follow up on 4/18  Patient was deemed fit for discharge on 4/17  Please see above list of diagnoses and related plan for additional information  Condition at Discharge: stable    Discharge Day Visit / Exam:   Subjective:  Patient seen at bedside today  No acute events   Patient states that she wants "to get home to her dog  Vitals: Blood Pressure: 148/94 (04/17/23 1431)  Pulse: 95 (04/17/23 0738)  Temperature: 98 °F (36 7 °C) (04/17/23 0738)  Temp Source: Oral (04/15/23 1651)  Respirations: 18 (04/17/23 0738)  Height: 5' 8\" (172 7 cm) (04/15/23 1636)  Weight - Scale: 95 3 kg (210 lb 1 6 oz) (04/16/23 0600)  SpO2: 95 % (04/17/23 0738)  Exam:   Physical Exam  Vitals and nursing note reviewed  Constitutional:       General: She is not in acute distress  Appearance: She is well-developed  HENT:      Head: Normocephalic and atraumatic  Right Ear: External ear normal       Left Ear: External ear normal    Eyes:      Conjunctiva/sclera: Conjunctivae normal    Cardiovascular:      Rate and Rhythm: Normal rate and regular rhythm  Heart sounds: No murmur heard  Pulmonary:      Effort: Pulmonary effort is normal  No respiratory distress  Breath sounds: Normal breath sounds  Abdominal:      Palpations: Abdomen is soft  Tenderness: There is no abdominal tenderness  Musculoskeletal:         General: No swelling  Cervical back: Neck supple  Skin:     General: Skin is warm and dry  Capillary Refill: Capillary refill takes less than 2 seconds  Neurological:      General: No focal deficit present  Mental Status: She is alert and oriented to person, place, and time  Psychiatric:         Mood and Affect: Mood normal          Behavior: Behavior normal           Discussion with Family: Updated  () at bedside  Discharge instructions/Information to patient and family:   See after visit summary for information provided to patient and family  Provisions for Follow-Up Care:  See after visit summary for information related to follow-up care and any pertinent home health orders  Disposition:   Home    Planned Readmission: none    Discharge Medications:  See after visit summary for reconciled discharge medications provided to patient and/or family    " **Please Note: This note may have been constructed using a voice recognition system**

## 2023-06-26 ENCOUNTER — ANNUAL EXAM (OUTPATIENT)
Dept: GYNECOLOGY | Facility: CLINIC | Age: 38
End: 2023-06-26
Payer: COMMERCIAL

## 2023-06-26 DIAGNOSIS — N76.0 ACUTE VAGINITIS: ICD-10-CM

## 2023-06-26 DIAGNOSIS — N94.10 DYSPAREUNIA IN FEMALE: ICD-10-CM

## 2023-06-26 DIAGNOSIS — B37.49 GENITAL CANDIDIASIS: ICD-10-CM

## 2023-06-26 DIAGNOSIS — Z11.3 SCREENING FOR STDS (SEXUALLY TRANSMITTED DISEASES): ICD-10-CM

## 2023-06-26 DIAGNOSIS — N89.8 VAGINAL IRRITATION: ICD-10-CM

## 2023-06-26 DIAGNOSIS — Z11.51 SCREENING FOR HPV (HUMAN PAPILLOMAVIRUS): ICD-10-CM

## 2023-06-26 DIAGNOSIS — Z12.31 SCREENING MAMMOGRAM FOR BREAST CANCER: ICD-10-CM

## 2023-06-26 DIAGNOSIS — Z01.419 ROUTINE GYNECOLOGICAL EXAMINATION: Primary | ICD-10-CM

## 2023-06-26 PROCEDURE — G0145 SCR C/V CYTO,THINLAYER,RESCR: HCPCS | Performed by: OBSTETRICS & GYNECOLOGY

## 2023-06-26 PROCEDURE — S0612 ANNUAL GYNECOLOGICAL EXAMINA: HCPCS | Performed by: OBSTETRICS & GYNECOLOGY

## 2023-06-26 PROCEDURE — 87070 CULTURE OTHR SPECIMN AEROBIC: CPT | Performed by: OBSTETRICS & GYNECOLOGY

## 2023-06-26 PROCEDURE — G0476 HPV COMBO ASSAY CA SCREEN: HCPCS | Performed by: OBSTETRICS & GYNECOLOGY

## 2023-06-26 NOTE — PROGRESS NOTES
Assessment:  1  Bilateral occipital neuralgia    2  Myofascial pain syndrome        Plan:  1  I will trial the patient on duloxetine 20 mg daily for pain complaints  The patient denied being prescribed any anti-depressant and/or psychiatric medications  I reviewed with the patient that it may take 3-4 weeks for the medication's effects to be noticed and that it should never be abruptly stopped  Possible side effects include but are not limited to; vertigo, lethargy, nausea, worsening depression/anxiety, and confusion  I advised the patient to call our office if they experience any side effects  The patient verbalized an understanding  2   May consider trigger point injections  Patient defers at this time  3  May consider chiropractic therapy  Patient defers at this time  4  Patient may continue ibuprofen as needed and should not exceed more than 2400 mg daily  5  Patient may continue Tylenol as needed and should not exceed more than 3000 mg in 24 hours  6  Patient will follow-up in 8 weeks or sooner if needed    History of Present Illness: The patient is a 34 y o  female with a history of occipital neuralgia last seen on 03/23/2022 who presents for a follow up office visit in regards to chronic neck and headache pain  The patient denies any radicular complaints into the upper extremities, bowel or bladder incontinence or balance issues  Patient has had bilateral occipital nerve blocks in February 2022 which essentiallyresolved her symptoms for approximately a year  Unfortunately, occipital nerve blocks and no longer covered by the patient's insurance  She has tried gabapentin in the past however caused too much sedation therefore she discontinued the medication  She uses over-the-counter ibuprofen and Tylenol with mild relief  The patient rates her pain an 8 out of 10 on the numeric pain rating scale    She occasionally has pain in the morning which is described as sharp, throbbing and pressure-like    I have personally reviewed and/or updated the patient's past medical history, past surgical history, family history, social history, current medications, allergies, and vital signs today  Review of Systems:    Review of Systems   Respiratory: Negative for shortness of breath  Cardiovascular: Negative for chest pain  Gastrointestinal: Negative for constipation, diarrhea, nausea and vomiting  Musculoskeletal: Negative for arthralgias, gait problem, joint swelling and myalgias  Skin: Negative for rash  Neurological: Negative for dizziness, seizures and weakness  All other systems reviewed and are negative  Past Medical History:   Diagnosis Date   • ADHD (attention deficit hyperactivity disorder)    • Anesthesia complication     needed a nebulizer Tx after cholecystectomy-only happened with this surgery   • Asthma     sports induced   • Black stools     with abdominal pain   • Breathing difficulty    • Chiari malformation type II (HCC)    • Clostridium difficile infection    • CTS (carpal tunnel syndrome)    • Eosinophilic esophagitis    • GERD (gastroesophageal reflux disease)    • Migraine    • Papanicolaou smear for cervical cancer screening 2018    neg   • Varicella     childhood       Past Surgical History:   Procedure Laterality Date   • ACHILLES TENDON LENGTHENING Right    •  SECTION      x2   • CHOLECYSTECTOMY     • LA  DELIVERY ONLY N/A 2016    Procedure:  SECTION ();   Surgeon: Apurva Jackson MD;  [facility];  Service: Obstetrics   • LA  DELIVERY ONLY N/A 2021    Procedure:  SECTION () REPEAT;  Surgeon: Apurva Jackson MD;  [facility];  Service: Obstetrics   • LA LAPAROSCOPY SURG CHOLECYSTECTOMY N/A 2019    Procedure: CHOLECYSTECTOMY LAPAROSCOPIC;  Surgeon: Geoffrey Guerrero DO;  [facility];  Service: General   • SALPINGECTOMY Bilateral    • TUBAL LIGATION Bilateral 2021 "   Procedure: LIGATION/COAGULATION TUBAL;  Surgeon: Jennifer Almanzar MD;  [facility] ;  Service: Obstetrics   • WISDOM TOOTH EXTRACTION         Family History   Problem Relation Age of Onset   • Hypothyroidism Mother    • Rheumatic fever Mother    • Hypertension Father    • Depression Father    • Alzheimer's disease Father    • ADD / ADHD Brother    • Diabetes Maternal Grandfather    • Hearing loss Paternal Grandfather    • Diabetes Paternal Grandfather    • Hypertension Paternal Grandfather        Social History     Occupational History   • Not on file   Tobacco Use   • Smoking status: Never   • Smokeless tobacco: Never   Vaping Use   • Vaping Use: Never used   Substance and Sexual Activity   • Alcohol use: Yes     Comment: social   • Drug use: No   • Sexual activity: Yes     Partners: Male     Birth control/protection: OCP, Surgical         Current Outpatient Medications:   •  albuterol (PROVENTIL HFA,VENTOLIN HFA) 90 mcg/act inhaler, Inhale 2 puffs every 6 (six) hours as needed for wheezing ProAir, Disp: , Rfl:   •  DULoxetine (CYMBALTA) 20 mg capsule, Take 1 capsule (20 mg total) by mouth daily, Disp: 30 capsule, Rfl: 1  •  pantoprazole (PROTONIX) 40 mg tablet, Take 1 tablet (40 mg total) by mouth 2 (two) times a day before meals, Disp: 180 tablet, Rfl: 0    No Known Allergies    Physical Exam:    /84   Pulse 67   Ht 5' 7\" (1 702 m)   Wt 105 kg (231 lb)   BMI 36 18 kg/m²     Constitutional:normal, well developed, well nourished, alert, in no distress and non-toxic and no overt pain behavior  Eyes:anicteric  HEENT:grossly intact  Neck:supple, symmetric, trachea midline and no masses   Pulmonary:even and unlabored  Cardiovascular:No edema or pitting edema present  Skin:Normal without rashes or lesions and well hydrated  Psychiatric:Mood and affect appropriate  Neurologic:Cranial Nerves II-XII grossly intact  Musculoskeletal:normal gait    Bilateral cervical paraspinal musculature tender to " palpation  Imaging  No orders to display         No orders of the defined types were placed in this encounter

## 2023-06-26 NOTE — PROGRESS NOTES
Assessment/Plan:  Normal breast exam  Dyspareunia  Normal GYN exam   bilateral salpingectomy 2021  Colonoscopy (normal) 2220  Eosinophilic esophagitis    Plan: Check Pap smear and vaginal culture  If negative return to office for vulvar colposcopy  Commend healthy diet and exercise     Subjective:    Bilateral salpingectomy     Patient ID: Eilleen Rinne is a 34 y o  female presents to the office complaining of increasing external dyspareunia over the last year  Always has a white discharge around the vulva  No vaginal itching  No recent antibiotic use  Uses unscented Dove soap  Menstrual cycles are regular and heavier  Occasional postcoital bleeding but uncertain when it occurs and if it is around the time of her menses  Denies any deep dyspareunia or pelvic pain  Denies any or bladder issues  Diagnosed with eosinophilic gastritis  Normal colonoscopy   Working full-time for [employer] from home  Review of Systems   Constitutional: Negative  Negative for fatigue, fever and unexpected weight change  HENT: Negative  Eyes: Negative  Respiratory: Negative  Negative for chest tightness, shortness of breath, wheezing and stridor  Cardiovascular: Negative  Negative for chest pain, palpitations and leg swelling  Gastrointestinal: Negative  Negative for abdominal pain, blood in stool, diarrhea, nausea, rectal pain and vomiting  Endocrine: Negative  Genitourinary: Positive for dyspareunia  Negative for dysuria, frequency, vaginal bleeding, vaginal discharge and vaginal pain  Musculoskeletal: Negative  Skin: Negative  Allergic/Immunologic: Negative  Neurological: Negative  Hematological: Negative  Psychiatric/Behavioral: Negative  All other systems reviewed and are negative  Objective: There were no vitals taken for this visit           Physical Exam  Constitutional:       Appearance: She is well-developed  HENT:      Head: Normocephalic and atraumatic  Neck:      Thyroid: No thyromegaly  Trachea: No tracheal deviation  Cardiovascular:      Rate and Rhythm: Normal rate and regular rhythm  Heart sounds: Normal heart sounds  Pulmonary:      Effort: Pulmonary effort is normal  No respiratory distress  Breath sounds: Normal breath sounds  No stridor  No wheezing or rales  Chest:      Chest wall: No tenderness  Breasts:     Breasts are symmetrical       Right: No inverted nipple, mass, nipple discharge, skin change or tenderness  Left: No inverted nipple, mass, nipple discharge, skin change or tenderness  Abdominal:      General: Bowel sounds are normal  There is no distension  Palpations: Abdomen is soft  There is no mass  Tenderness: There is no abdominal tenderness  There is no guarding or rebound  Hernia: No hernia is present  There is no hernia in the left inguinal area  Genitourinary:     Labia:         Right: No rash, tenderness, lesion or injury  Left: No rash, tenderness, lesion or injury  Vagina: Normal  No signs of injury and foreign body  No vaginal discharge, erythema, tenderness or bleeding  Cervix: No cervical motion tenderness, discharge or friability  Uterus: Not deviated, not enlarged, not fixed and not tender  Adnexa:         Right: No mass, tenderness or fullness  Left: No mass, tenderness or fullness  Rectum: No mass, anal fissure, external hemorrhoid or internal hemorrhoid  Comments: Slight weight discharge externally  Mild tenderness in the posterior fourchette  Normal urethra and [place]'s glands  Musculoskeletal:      Cervical back: Normal range of motion and neck supple  Lymphadenopathy:      Lower Body: No right inguinal adenopathy  No left inguinal adenopathy  Skin:     General: Skin is warm and dry     Neurological:      Mental Status: She is alert and oriented to person, place, and time  Psychiatric:         Behavior: Behavior normal          Thought Content:  Thought content normal          Judgment: Judgment normal

## 2023-06-27 ENCOUNTER — OFFICE VISIT (OUTPATIENT)
Dept: PAIN MEDICINE | Facility: CLINIC | Age: 38
End: 2023-06-27
Payer: COMMERCIAL

## 2023-06-27 VITALS
SYSTOLIC BLOOD PRESSURE: 125 MMHG | WEIGHT: 231 LBS | HEART RATE: 67 BPM | HEIGHT: 67 IN | DIASTOLIC BLOOD PRESSURE: 84 MMHG | BODY MASS INDEX: 36.26 KG/M2

## 2023-06-27 DIAGNOSIS — M54.81 BILATERAL OCCIPITAL NEURALGIA: Primary | ICD-10-CM

## 2023-06-27 DIAGNOSIS — M79.18 MYOFASCIAL PAIN SYNDROME: ICD-10-CM

## 2023-06-27 PROCEDURE — 99214 OFFICE O/P EST MOD 30 MIN: CPT | Performed by: NURSE PRACTITIONER

## 2023-06-27 RX ORDER — DULOXETIN HYDROCHLORIDE 20 MG/1
20 CAPSULE, DELAYED RELEASE ORAL DAILY
Qty: 30 CAPSULE | Refills: 1 | Status: SHIPPED | OUTPATIENT
Start: 2023-06-27

## 2023-06-27 NOTE — PATIENT INSTRUCTIONS
Duloxetine (By mouth)   Duloxetine (doo-LOX-[id number])  Treats depression, anxiety, diabetic peripheral neuropathy, fibromyalgia, and chronic muscle or bone pain  This medicine is a SNRI  Brand Name(s): Cymbalta, Rositaalma Fredrick, Belénka   There may be other brand names for this medicine  When This Medicine Should Not Be Used: This medicine is not right for everyone  Do not use it if you had an allergic reaction to duloxetine  How to Use This Medicine:   Capsule, Delayed Release Capsule  Take your medicine as directed  Your dose may need to be changed several times to find what works best for you  Delayed-release capsule: Swallow the capsule whole  Do not crush, chew, break, or open it  Do not open the Cymbalta® delayed-release capsule and sprinkle the contents on food or in liquids  If you have trouble swallowing the Elizabeth Eisenmenger delayed release capsule: You may open the capsule and sprinkle the contents over one tablespoon (15 mL) of applesauce  Swallow the mixture right away and do not save any of the mixture to use later  You may open the capsule and pour the contents to an all plastic catheter tip syringe and add 50 mL of water  Do not use other liquids  Gently shake it for 10 seconds, and then use it through a nasogastric tube  Rinse with additional water (about 15 mL) if needed  This medicine should come with a Medication Guide  Ask your pharmacist for a copy if you do not have one  Missed dose: Take a dose as soon as you remember  If it is almost time for your next dose, wait until then and take a regular dose  Do not take extra medicine to make up for a missed dose  Store the medicine in a closed container at room temperature, away from heat, moisture, and direct light  Drugs and Foods to Avoid:   Ask your doctor or pharmacist before using any other medicine, including over-the-counter medicines, vitamins, and herbal products    Do not take duloxetine if you have used an MAO inhibitor (MAOI) within the past 14 days  Do not start taking an MAO inhibitor within 5 days of stopping duloxetine  Ask your doctor if you are not sure if you take an MAOI, including linezolid or methylene blue injection  Some medicines can affect how duloxetine works  Tell your doctor if you are using any of the following:  Buspirone, cimetidine, ciprofloxacin, enoxacin, fentanyl, fluvoxamine, lithium, Te's wort, theophylline, tramadol, tryptophan, warfarin  Amphetamines  Blood pressure medicine  Diuretic (water pill)  Medicine for heart rhythm problems (including flecainide, propafenone, quinidine)  Medicine to treat migraine headaches (including triptans)  NSAID pain or arthritis medicine (including aspirin, celecoxib, diclofenac, ibuprofen, naproxen)  Other medicine to treat depression or mood disorders (including amitriptyline, desipramine, fluoxetine, imipramine, nortriptyline, paroxetine)  Phenothiazine medicine (including thioridazine)  Stomach medicine (including famotidine, antacids containing aluminum or magnesium, PPIs)  Tell your doctor if you use anything else that makes you sleepy  Some examples are allergy medicine, narcotic pain medicine, and alcohol  Do not drink alcohol while you are using this medicine  Warnings While Using This Medicine:   Tell your doctor if you are pregnant or breastfeeding, or if you have kidney disease, liver disease, bleeding problems, diabetes, digestion problems, glaucoma, heart disease, high or low blood pressure, or problems with urination  Tell your doctor if you smoke or you have a history of seizures, mental health problems (including bipolar disorder, elda), or drug or alcohol addiction    This medicine may cause the following problems:   Serious liver problems  Serotonin syndrome, when used with certain medicines  Increased risk of bleeding problems  Serious skin reactions, including erythema multiforme, Tran-Blayne syndrome  Low sodium levels in the blood  Sexual problems  This medicine can increase thoughts of suicide  Tell your doctor right away if you start to feel depressed and have thoughts about hurting yourself  This medicine may cause blurred vision, dizziness, drowsiness, trouble with thinking, or trouble with controlling body movements, which may lead to falls, fractures, or other injuries  Do not drive or do anything that could be dangerous until you know how this medicine affects you  Stand up slowly to avoid falls  Do not stop using this medicine suddenly  Your doctor will need to slowly decrease your dose before you stop it completely  Your doctor will check your progress and the effects of this medicine at regular visits  Keep all appointments  Keep all medicine out of the reach of children  Never share your medicine with anyone  Possible Side Effects While Using This Medicine:   Call your doctor right away if you notice any of these side effects:   Allergic reaction: Itching or hives, swelling in your face or hands, swelling or tingling in your mouth or throat, chest tightness, trouble breathing  Anxiety, restlessness, fever, fast heartbeat, sweating, muscle spasms, diarrhea, seeing or hearing things that are not there  Blistering, peeling, red skin rash  Confusion, weakness, muscle twitching  Dark urine or pale stools, nausea, vomiting, loss of appetite, stomach pain, yellow skin or eyes  Decrease in how much or how often you urinate  Decrease in sexual ability, desire, drive, or performance  Eye pain, vision changes, seeing halos around lights  Feeling more energetic than usual  Lightheadedness, dizziness, fainting  Unusual moods or behaviors, worsening depression, thoughts about hurting yourself, trouble sleeping  Unusual bleeding or bruising  If you notice these less serious side effects, talk with your doctor:   Decrease in appetite or weight  Dry mouth, constipation, mild nausea  Headache  Unusual drowsiness, sleepiness, or tiredness  If you notice other side effects that you think are caused by this medicine, tell your doctor  Call your doctor for medical advice about side effects  You may report side effects to FDA at 5-408-GAX-4090  © Copyright Sharon Amin 2022 Information is for End User's use only and may not be sold, redistributed or otherwise used for commercial purposes  The above information is an  only  It is not intended as medical advice for individual conditions or treatments  Talk to your doctor, nurse or pharmacist before following any medical regimen to see if it is safe and effective for you

## 2023-06-29 LAB
HPV HR 12 DNA CVX QL NAA+PROBE: NEGATIVE
HPV16 DNA CVX QL NAA+PROBE: NEGATIVE
HPV18 DNA CVX QL NAA+PROBE: NEGATIVE

## 2023-06-30 LAB
LAB AP GYN PRIMARY INTERPRETATION: NORMAL
Lab: NORMAL

## 2023-07-01 LAB — BACTERIA GENITAL AEROBE CULT: NORMAL

## 2023-07-22 ENCOUNTER — APPOINTMENT (OUTPATIENT)
Dept: LAB | Facility: MEDICAL CENTER | Age: 38
End: 2023-07-22

## 2023-07-22 DIAGNOSIS — Z00.8 HEALTH EXAMINATION IN POPULATION SURVEY: ICD-10-CM

## 2023-07-22 LAB
CHOLEST SERPL-MCNC: 154 MG/DL
EST. AVERAGE GLUCOSE BLD GHB EST-MCNC: 103 MG/DL
HBA1C MFR BLD: 5.2 %
HDLC SERPL-MCNC: 57 MG/DL
LDLC SERPL CALC-MCNC: 80 MG/DL (ref 0–100)
NONHDLC SERPL-MCNC: 97 MG/DL
TRIGL SERPL-MCNC: 86 MG/DL

## 2023-07-22 PROCEDURE — 36415 COLL VENOUS BLD VENIPUNCTURE: CPT

## 2023-07-22 PROCEDURE — 80061 LIPID PANEL: CPT

## 2023-07-22 PROCEDURE — 83036 HEMOGLOBIN GLYCOSYLATED A1C: CPT

## 2023-10-10 ENCOUNTER — OFFICE VISIT (OUTPATIENT)
Dept: URGENT CARE | Facility: MEDICAL CENTER | Age: 38
End: 2023-10-10
Payer: COMMERCIAL

## 2023-10-10 VITALS
BODY MASS INDEX: 36.41 KG/M2 | HEART RATE: 88 BPM | RESPIRATION RATE: 18 BRPM | HEIGHT: 67 IN | DIASTOLIC BLOOD PRESSURE: 69 MMHG | OXYGEN SATURATION: 97 % | TEMPERATURE: 98 F | SYSTOLIC BLOOD PRESSURE: 127 MMHG | WEIGHT: 232 LBS

## 2023-10-10 DIAGNOSIS — R68.89 FLU-LIKE SYMPTOMS: Primary | ICD-10-CM

## 2023-10-10 PROCEDURE — 99213 OFFICE O/P EST LOW 20 MIN: CPT | Performed by: PHYSICIAN ASSISTANT

## 2023-10-10 PROCEDURE — 87636 SARSCOV2 & INF A&B AMP PRB: CPT | Performed by: PHYSICIAN ASSISTANT

## 2023-10-10 RX ORDER — OSELTAMIVIR PHOSPHATE 75 MG/1
75 CAPSULE ORAL 2 TIMES DAILY
Qty: 10 CAPSULE | Refills: 0 | Status: SHIPPED | OUTPATIENT
Start: 2023-10-10 | End: 2023-10-15

## 2023-10-10 RX ORDER — BROMPHENIRAMINE MALEATE, PSEUDOEPHEDRINE HYDROCHLORIDE, AND DEXTROMETHORPHAN HYDROBROMIDE 2; 30; 10 MG/5ML; MG/5ML; MG/5ML
5 SYRUP ORAL 4 TIMES DAILY PRN
Qty: 120 ML | Refills: 0 | Status: SHIPPED | OUTPATIENT
Start: 2023-10-10 | End: 2023-10-15

## 2023-10-10 NOTE — LETTER
October 10, 2023     Patient: Chiquita Zarate   YOB: 1985   Date of Visit: 10/10/2023       To Whom it May Concern:    Drew Oliveros was seen in my clinic on 10/10/2023. She may return to work when she is fever free x24 hours without fever reducing medication. If you have any questions or concerns, please don't hesitate to call.          Sincerely,          [facility]        CC: No Recipients

## 2023-10-10 NOTE — PROGRESS NOTES
[facility]        NAME: Leora Magaña is a 34 y.o. female  : 1985    MRN: [id number]  DATE: October 10, 2023  TIME: 9:07 AM    Assessment and Plan   Flu-like symptoms [R68.89]  1. Flu-like symptoms              Patient Instructions     Flulike symptoms  Bromfed as needed for cough/congestion  Follow up with PCP in 3-5 days. Proceed to  ER if symptoms worsen. Chief Complaint     Chief Complaint   Patient presents with   • Fever     Pt. With fever and body aches that began yesterday. Home Covid test was negative. History of Present Illness       76-year-old female who presents complaining of nonproductive cough, fevers Tmax 102, chills, body aches that started last night. Patient denies chest pain, shortness of breath. States she did COVID-19 test at home which was negative. Fever  Associated symptoms include congestion, coughing and a fever. Pertinent negatives include no chills, diaphoresis, fatigue or sore throat. Review of Systems   Review of Systems   Constitutional: Positive for fever. Negative for activity change, appetite change, chills, diaphoresis and fatigue. HENT: Positive for congestion and rhinorrhea. Negative for ear discharge, ear pain, facial swelling, hearing loss, mouth sores, nosebleeds, postnasal drip, sinus pressure, sinus pain, sneezing, sore throat and voice change. Respiratory: Positive for cough. Negative for apnea, choking, chest tightness, shortness of breath, wheezing and stridor. Cardiovascular: Negative.           Current Medications       Current Outpatient Medications:   •  albuterol (PROVENTIL HFA,VENTOLIN HFA) 90 mcg/act inhaler, Inhale 2 puffs every 6 (six) hours as needed for wheezing ProAir, Disp: , Rfl:   •  DULoxetine (CYMBALTA) 20 mg capsule, Take 1 capsule (20 mg total) by mouth daily, Disp: 30 capsule, Rfl: 1  •  pantoprazole (PROTONIX) 40 mg tablet, Take 1 tablet (40 mg total) by mouth 2 (two) times a day before meals, Disp: 180 tablet, Rfl: 0    Current Allergies     Allergies as of 10/10/2023   • (No Known Allergies)            The following portions of the patient's history were reviewed and updated as appropriate: allergies, current medications, past family history, past medical history, past social history, past surgical history and problem list.     Past Medical History:   Diagnosis Date   • ADHD (attention deficit hyperactivity disorder)    • Anesthesia complication     needed a nebulizer Tx after cholecystectomy-only happened with this surgery   • Asthma     sports induced   • Black stools     with abdominal pain   • Breathing difficulty    • Chiari malformation type II (HCC)    • Clostridium difficile infection    • CTS (carpal tunnel syndrome)    • Eosinophilic esophagitis    • GERD (gastroesophageal reflux disease)    • Migraine    • Papanicolaou smear for cervical cancer screening 2018    neg   • Varicella     childhood       Past Surgical History:   Procedure Laterality Date   • ACHILLES TENDON LENGTHENING Right    •  SECTION      x2   • CHOLECYSTECTOMY     • [place]  DELIVERY ONLY N/A 2016    Procedure:  SECTION ();   Surgeon: Latosha Lynne MD;  [facility];  Service: Obstetrics   • [place]  DELIVERY ONLY N/A 2021    Procedure: Jennifer Cordia () REPEAT;  Surgeon: Latosha Lynne MD;  [facility];  Service: Obstetrics   • [place] LAPAROSCOPY SURG CHOLECYSTECTOMY N/A 2019    Procedure: CHOLECYSTECTOMY LAPAROSCOPIC;  Surgeon: Nieves Kan DO;  [facility];  Service: General   • SALPINGECTOMY Bilateral    • TUBAL LIGATION Bilateral 2021    Procedure: LIGATION/COAGULATION TUBAL;  Surgeon: Latosha Lynne MD;  [facility];  Service: Obstetrics   • WISDOM TOOTH EXTRACTION         Family History   Problem Relation Age of Onset   • Hypothyroidism Mother    • Rheumatic fever Mother    • Hypertension Father    • Depression Father    • Alzheimer's disease Father    • ADD / ADHD Brother    • Diabetes Maternal Grandfather    • Hearing loss Paternal Grandfather    • Diabetes Paternal Grandfather    • Hypertension Paternal Grandfather          Medications have been verified. Objective   /69   Pulse 88   Temp 98 °F (36.7 °C)   Resp 18   Ht 5' 7" (1.702 m)   Wt 105 kg (232 lb)   SpO2 97%   BMI 36.34 kg/m²        Physical Exam     Physical Exam  Constitutional:       General: She is not in acute distress. Appearance: She is well-developed. She is not diaphoretic. HENT:      Head: Normocephalic and atraumatic. Right Ear: Hearing, tympanic membrane, ear canal and external ear normal.      Left Ear: Hearing, tympanic membrane, ear canal and external ear normal.      Nose: Rhinorrhea present. Mouth/Throat:      Pharynx: Uvula midline. Cardiovascular:      Rate and Rhythm: Normal rate and regular rhythm. Heart sounds: Normal heart sounds. Pulmonary:      Effort: Pulmonary effort is normal. No respiratory distress. Breath sounds: Normal breath sounds. No stridor. No wheezing, rhonchi or rales. Chest:      Chest wall: No tenderness. Musculoskeletal:      Cervical back: Normal range of motion and neck supple. Lymphadenopathy:      Cervical: Cervical adenopathy present.

## 2023-10-10 NOTE — PATIENT INSTRUCTIONS
Flulike symptoms  Bromfed as needed for cough/congestion  Follow up with PCP in 3-5 days. Proceed to  ER if symptoms worsen.

## 2023-10-11 LAB
FLUAV RNA RESP QL NAA+PROBE: NEGATIVE
FLUBV RNA RESP QL NAA+PROBE: NEGATIVE
SARS-COV-2 RNA RESP QL NAA+PROBE: NEGATIVE

## 2023-10-24 ENCOUNTER — HOSPITAL ENCOUNTER (OUTPATIENT)
Dept: MAMMOGRAPHY | Facility: HOSPITAL | Age: 38
Discharge: HOME | End: 2023-10-24
Attending: OBSTETRICS & GYNECOLOGY
Payer: COMMERCIAL

## 2023-10-24 VITALS — BODY MASS INDEX: 36.33 KG/M2 | HEIGHT: 67 IN | WEIGHT: 231.48 LBS

## 2023-10-24 DIAGNOSIS — Z12.31 SCREENING MAMMOGRAM FOR BREAST CANCER: ICD-10-CM

## 2023-10-24 PROCEDURE — 77067 SCR MAMMO BI INCL CAD: CPT

## 2023-10-24 PROCEDURE — 77063 BREAST TOMOSYNTHESIS BI: CPT

## 2023-11-20 ENCOUNTER — OFFICE VISIT (OUTPATIENT)
Dept: URGENT CARE | Facility: MEDICAL CENTER | Age: 38
End: 2023-11-20
Payer: COMMERCIAL

## 2023-11-20 VITALS
WEIGHT: 231 LBS | DIASTOLIC BLOOD PRESSURE: 82 MMHG | HEART RATE: 89 BPM | HEIGHT: 67 IN | OXYGEN SATURATION: 98 % | SYSTOLIC BLOOD PRESSURE: 120 MMHG | RESPIRATION RATE: 18 BRPM | TEMPERATURE: 98.1 F | BODY MASS INDEX: 36.26 KG/M2

## 2023-11-20 DIAGNOSIS — R68.89 FLU-LIKE SYMPTOMS: Primary | ICD-10-CM

## 2023-11-20 DIAGNOSIS — J34.89 SINUS PRESSURE: ICD-10-CM

## 2023-11-20 PROCEDURE — 87636 SARSCOV2 & INF A&B AMP PRB: CPT | Performed by: PHYSICIAN ASSISTANT

## 2023-11-20 PROCEDURE — 99213 OFFICE O/P EST LOW 20 MIN: CPT | Performed by: PHYSICIAN ASSISTANT

## 2023-11-20 RX ORDER — OSELTAMIVIR PHOSPHATE 75 MG/1
75 CAPSULE ORAL 2 TIMES DAILY
Qty: 10 CAPSULE | Refills: 0 | Status: SHIPPED | OUTPATIENT
Start: 2023-11-20 | End: 2023-11-25

## 2023-11-20 NOTE — PATIENT INSTRUCTIONS
Flu like symptoms  Tamiflu as directed  Follow up with PCP in 3-5 days. Proceed to  ER if symptoms worsen.

## 2023-11-20 NOTE — LETTER
November 20, 2023     Patient: Estephania Berg   YOB: 1985   Date of Visit: 11/20/2023       To Whom it May Concern:    Eduard Mchugh was seen in my clinic on 11/20/2023. She may return to work when she is fever free x 24 hours without fever reducing medication. If you have any questions or concerns, please don't hesitate to call.          Sincerely,          [facility]        CC: No Recipients

## 2023-11-20 NOTE — PROGRESS NOTES
[facility]        NAME: Rojas David is a 34 y.o. female  : 1985    MRN: [id number]  DATE: 2023  TIME: 8:30 AM    Assessment and Plan   Flu-like symptoms [R68.89]  1. Flu-like symptoms  oseltamivir (TAMIFLU) 75 mg capsule      2. Sinus pressure  Covid/Flu-Office Collect            Patient Instructions     Flu like symptoms  Tamiflu as directed  Follow up with PCP in 3-5 days. Proceed to  ER if symptoms worsen. Chief Complaint     Chief Complaint   Patient presents with    Sinusitis     Sinus pressure, facial pain, post nasal drip, vomiting x2 days; at home covid tested which was negative; concerned for flu          History of Present Illness       35 y/o female presents c/o sinus pressure, body aches, congestion, post nasal drip and vomiting  x 3 days. Patient denies fever, cough, shortness of breath, abdominal pain    Sinusitis  Associated symptoms include chills, congestion and ear pain. Pertinent negatives include no coughing, diaphoresis, shortness of breath, sinus pressure, sneezing or sore throat. Review of Systems   Review of Systems   Constitutional:  Positive for chills. Negative for activity change, appetite change, diaphoresis, fatigue and fever. HENT:  Positive for congestion, ear pain, rhinorrhea and sinus pain. Negative for ear discharge, facial swelling, hearing loss, mouth sores, nosebleeds, postnasal drip, sinus pressure, sneezing, sore throat and voice change. Respiratory:  Negative for apnea, cough, choking, chest tightness, shortness of breath, wheezing and stridor. Cardiovascular: Negative.           Current Medications       Current Outpatient Medications:     oseltamivir (TAMIFLU) 75 mg capsule, Take 1 capsule (75 mg total) by mouth 2 (two) times a day for 5 days, Disp: 10 capsule, Rfl: 0    albuterol (PROVENTIL HFA,VENTOLIN HFA) 90 mcg/act inhaler, Inhale 2 puffs every 6 (six) hours as needed for wheezing ProAir, Disp: , Rfl: DULoxetine (CYMBALTA) 20 mg capsule, Take 1 capsule (20 mg total) by mouth daily, Disp: 30 capsule, Rfl: 1    pantoprazole (PROTONIX) 40 mg tablet, Take 1 tablet (40 mg total) by mouth 2 (two) times a day before meals, Disp: 180 tablet, Rfl: 0    Current Allergies     Allergies as of 2023    (No Known Allergies)            The following portions of the patient's history were reviewed and updated as appropriate: allergies, current medications, past family history, past medical history, past social history, past surgical history and problem list.     Past Medical History:   Diagnosis Date    ADHD (attention deficit hyperactivity disorder)     Anesthesia complication     needed a nebulizer Tx after cholecystectomy-only happened with this surgery    Asthma     sports induced    Black stools     with abdominal pain    Breathing difficulty     Chiari malformation type II (HCC)     Clostridium difficile infection     CTS (carpal tunnel syndrome)     Eosinophilic esophagitis     GERD (gastroesophageal reflux disease)     Migraine     Papanicolaou smear for cervical cancer screening 2018    neg    Varicella     childhood       Past Surgical History:   Procedure Laterality Date    ACHILLES TENDON LENGTHENING Right      SECTION      x2    CHOLECYSTECTOMY      [place]  DELIVERY ONLY N/A 2016    Procedure:  SECTION ();   Surgeon: Tracy Reece MD;  [facility];  Service: Obstetrics    [place]  DELIVERY ONLY N/A 2021    Procedure:  SECTION () REPEAT;  Surgeon: Tracy Reece MD;  [facility];  Service: Obstetrics    [place] LAPAROSCOPY SURG CHOLECYSTECTOMY N/A 2019    Procedure: CHOLECYSTECTOMY LAPAROSCOPIC;  Surgeon: Marina Mahoney DO;  [facility];  Service: General    SALPINGECTOMY Bilateral     TUBAL LIGATION Bilateral 2021    Procedure: LIGATION/COAGULATION TUBAL;  Surgeon: Tracy Reece MD;  [facility];  Service: Obstetrics WISDOM TOOTH EXTRACTION         Family History   Problem Relation Age of Onset    Breast cancer Mother 76    Hypothyroidism Mother     Rheumatic fever Mother     Hypertension Father     Depression Father     Alzheimer's disease Father     Diabetes Maternal Grandfather     Hearing loss Paternal Grandfather     Diabetes Paternal Grandfather     Hypertension Paternal Grandfather     ADD / ADHD Brother     No Known Problems Son     No Known Problems Son     No Known Problems Sister     No Known Problems Sister     No Known Problems Paternal Aunt          Medications have been verified. Objective   /82 (BP Location: Left arm, Patient Position: Sitting, Cuff Size: Large)   Pulse 89   Temp 98.1 °F (36.7 °C) (Temporal)   Resp 18   Ht 5' 7" (1.702 m)   Wt 105 kg (231 lb)   LMP 10/21/2023 (Exact Date)   SpO2 98%   BMI 36.18 kg/m²        Physical Exam     Physical Exam  Constitutional:       General: She is not in acute distress. Appearance: Normal appearance. She is well-developed. She is not diaphoretic. HENT:      Head: Normocephalic and atraumatic. Right Ear: Hearing, tympanic membrane, ear canal and external ear normal.      Left Ear: Hearing, tympanic membrane, ear canal and external ear normal.      Nose: Rhinorrhea present. Mouth/Throat:      Pharynx: Uvula midline. Cardiovascular:      Rate and Rhythm: Normal rate and regular rhythm. Heart sounds: Normal heart sounds. Pulmonary:      Effort: Pulmonary effort is normal.      Breath sounds: Normal breath sounds. Musculoskeletal:      Cervical back: Normal range of motion and neck supple. Lymphadenopathy:      Cervical: Cervical adenopathy present. Neurological:      Mental Status: She is alert.

## 2024-01-05 NOTE — TELEPHONE ENCOUNTER
Called patient to see if they could come early to there  apt due to open at 2:30PM 
Pt called back and states that she will not be able to make the earlier appt    She will be her for 3:30 appt
Tio HUERTAS, [place]

## 2024-01-10 ENCOUNTER — CONSULT (OUTPATIENT)
Dept: NEUROLOGY | Facility: CLINIC | Age: 39
End: 2024-01-10
Payer: COMMERCIAL

## 2024-01-10 VITALS
DIASTOLIC BLOOD PRESSURE: 80 MMHG | SYSTOLIC BLOOD PRESSURE: 120 MMHG | HEART RATE: 80 BPM | HEIGHT: 67 IN | BODY MASS INDEX: 36.1 KG/M2 | WEIGHT: 230 LBS

## 2024-01-10 DIAGNOSIS — G43.709 CHRONIC MIGRAINE WITHOUT AURA WITHOUT STATUS MIGRAINOSUS, NOT INTRACTABLE: ICD-10-CM

## 2024-01-10 DIAGNOSIS — M79.18 MYOFASCIAL PAIN SYNDROME: ICD-10-CM

## 2024-01-10 DIAGNOSIS — M54.81 BILATERAL OCCIPITAL NEURALGIA: Primary | ICD-10-CM

## 2024-01-10 PROCEDURE — 99244 OFF/OP CNSLTJ NEW/EST MOD 40: CPT | Performed by: PSYCHIATRY & NEUROLOGY

## 2024-01-10 PROCEDURE — 96372 THER/PROPH/DIAG INJ SC/IM: CPT | Performed by: PSYCHIATRY & NEUROLOGY

## 2024-01-10 RX ORDER — KETOROLAC TROMETHAMINE 30 MG/ML
30 INJECTION, SOLUTION INTRAMUSCULAR; INTRAVENOUS ONCE
Status: COMPLETED | OUTPATIENT
Start: 2024-01-10 | End: 2024-01-10

## 2024-01-10 RX ORDER — GABAPENTIN 300 MG/1
300 CAPSULE ORAL
Qty: 30 CAPSULE | Refills: 3 | Status: SHIPPED | OUTPATIENT
Start: 2024-01-10

## 2024-01-10 RX ORDER — PROCHLORPERAZINE EDISYLATE 5 MG/ML
10 INJECTION INTRAMUSCULAR; INTRAVENOUS ONCE
Status: COMPLETED | OUTPATIENT
Start: 2024-01-10 | End: 2024-01-10

## 2024-01-10 RX ADMIN — KETOROLAC TROMETHAMINE 30 MG: 30 INJECTION, SOLUTION INTRAMUSCULAR; INTRAVENOUS at 13:13

## 2024-01-10 RX ADMIN — PROCHLORPERAZINE EDISYLATE 10 MG: 5 INJECTION INTRAMUSCULAR; INTRAVENOUS at 13:15

## 2024-01-10 NOTE — PROGRESS NOTES
Patient ID: Cara Negrete is a 38 y.o. female.    Assessment/Plan:    This is a 37 y/o Female who is here as a new patient to establish care with us for bilateral occipital neuralgia, and patient was previously getting nerve blocks through pain management, but insurance is not getting it approved, and was recommended to see neurology. Patient was recommended gabapentin as well she has tried in the past, and she is willing to try again.     PLAN:      Diagnoses and all orders for this visit:    Bilateral occipital neuralgia  -patient seeing pain management but was recommended to see neurology  Will send patient to dr. Miller  Continue with gabapentin 300mg at bedtime for now  -recommend nerve blocks for her occpital neuralgia   Last MRI brain done in 2019 shows possible chiari 1 malformation  -could consider repeating Mri brain again but will defer to Dr. Miller at future appt.   -will give her Toradol/compazine injections in the office today.   -     gabapentin (Neurontin) 300 mg capsule; Take 1 capsule (300 mg total) by mouth daily at bedtime    Myofascial pain syndrome  -     Ambulatory Referral to Neurology    Chronic migraine without aura without status migrainosus, not intractable  -     Ambulatory Referral to Neurology  -     ketorolac (TORADOL) injection 30 mg  -     prochlorperazine (COMPAZINE) injection 10 mg       Follow up as needed .     I would be happy to see the patient sooner if any new questions/concerns arise.  Patient/Guardian was advised to the call the office if they have any questions and concerns in the meantime.     Patient/Guardian does understand that if they have any new stroke like symptoms such as facial droop on one side, weakness/paralysis on either side, speech trouble, numbness on one side, balance issues, any vision changes, extreme dizziness or any new headache, to call 9-1-1 immediately or to proceed to the nearest ER immediately.      Subjective:    Headache    This is a 38  y/o Female who is here as a new patient to establish care with us.  Patient is here for headaches.  She states her headache started at the age of 18.  They last sometimes a several days.  She has about a couple a week.  On a scale of 1-10 without medications about a 9 out of 10 with medications that bring the down to 5 out of 10.  Its bifrontally by occipitally.  Majority of the time they do start in the occiput and radiate up to the forehead.  It feels like a tight band pressure sharp stabbing-like.  She does have some with concentration problems decreased appetite sore/stiff neck flushing pale face prefers to lay in a dark quiet room lightheadedness dizziness sensitive to light sound and odor eye tearing.  Triggers for headache are stress weather changes she does try Motrin and Tylenol Advil and Aleve for when she gets the headaches but she has to take almost 4-5 to bring her headache down.  When she had her nerve blocks and injections she almost did not have headaches for 1 year and she is hoping to try those again but she has been having a lot of pushback from pain management and has had to make several different appeals but eventually they stated that she needed to see neurology so she is here for primarily nerve blocks today.    The following portions of the patient's history were reviewed and updated as appropriate: She  has a past medical history of ADHD (attention deficit hyperactivity disorder), Anesthesia complication, Asthma, Black stools, Breathing difficulty, Chiari malformation type II (HCC), Clostridium difficile infection, CTS (carpal tunnel syndrome), Eosinophilic esophagitis, GERD (gastroesophageal reflux disease), Migraine, Papanicolaou smear for cervical cancer screening (05/2018), and Varicella.  She   Patient Active Problem List    Diagnosis Date Noted    Black stool 10/14/2022    Eosinophilic esophagitis 10/14/2022    PMDD (premenstrual dysphoric disorder) 08/31/2022    BMI 35.0-35.9,adult  2022    Patellofemoral arthritis of right knee 2021    Patella blaine 2021    Gastroesophageal reflux disease 2021    Gestational hypertension 2021    S/P repeat low transverse  2021    Status post bilateral salpingectomy 2021    History of pre-eclampsia 2021    38 weeks gestation of pregnancy 2021    AMA (advanced maternal age) multigravida 35+ 2021    History of  delivery, antepartum 2021    Paresthesia of left arm 08/10/2020    Paresthesia of right arm 08/10/2020    Chronic migraine without aura without status migrainosus, not intractable 2020    Bilateral occipital neuralgia 2019    Class 2 obesity in adult 2019    Occipital headache 2019    Cervical paraspinal muscle spasm 2019    Chiari malformation type I (HCC) 2019    Neck pain 2018    Cholelithiasis 2018    Mild persistent asthma without complication 06/15/2018    Back pain 2018     She  has a past surgical history that includes Achilles tendon lengthening (Right); pr  delivery only (N/A, 2016); Cardinal tooth extraction; pr laparoscopy surg cholecystectomy (N/A, 2019); pr  delivery only (N/A, 2021); Tubal ligation (Bilateral, 2021);  section; Cholecystectomy; and Salpingectomy (Bilateral).  Her family history includes ADD / ADHD in her brother; Alzheimer's disease in her father; Breast cancer (age of onset: 74) in her mother; Depression in her father; Diabetes in her maternal grandfather and paternal grandfather; Hearing loss in her paternal grandfather; Hypertension in her father and paternal grandfather; Hypothyroidism in her mother; No Known Problems in her paternal aunt, sister, sister, son, and son; Rheumatic fever in her mother.  She  reports that she has never smoked. She has never used smokeless tobacco. She reports current alcohol use. She reports that she does not use  "drugs.  Current Outpatient Medications   Medication Sig Dispense Refill    albuterol (PROVENTIL HFA,VENTOLIN HFA) 90 mcg/act inhaler Inhale 2 puffs every 6 (six) hours as needed for wheezing ProAir      gabapentin (Neurontin) 300 mg capsule Take 1 capsule (300 mg total) by mouth daily at bedtime 30 capsule 3    pantoprazole (PROTONIX) 40 mg tablet Take 1 tablet (40 mg total) by mouth 2 (two) times a day before meals 180 tablet 0    DULoxetine (CYMBALTA) 20 mg capsule Take 1 capsule (20 mg total) by mouth daily (Patient not taking: Reported on 1/10/2024) 30 capsule 1     Current Facility-Administered Medications   Medication Dose Route Frequency Provider Last Rate Last Admin    ketorolac (TORADOL) injection 30 mg  30 mg Intramuscular Once Soraya Rosales MD        prochlorperazine (COMPAZINE) injection 10 mg  10 mg Intramuscular Once Soraya Rosales MD         Current Outpatient Medications on File Prior to Visit   Medication Sig    albuterol (PROVENTIL HFA,VENTOLIN HFA) 90 mcg/act inhaler Inhale 2 puffs every 6 (six) hours as needed for wheezing ProAir    pantoprazole (PROTONIX) 40 mg tablet Take 1 tablet (40 mg total) by mouth 2 (two) times a day before meals    DULoxetine (CYMBALTA) 20 mg capsule Take 1 capsule (20 mg total) by mouth daily (Patient not taking: Reported on 1/10/2024)     No current facility-administered medications on file prior to visit.     She has No Known Allergies..         Objective:    Blood pressure 120/80, pulse 80, height 5' 7\" (1.702 m), weight 104 kg (230 lb), not currently breastfeeding.    Physical Exam  General - patient is alert   Speech - no dysarthria noted, no aphasia noted.     Neuro:   Cranial nerves: PERRL, EOMI, facial sensation intact to soft touch in V1, V2 and V3, no facial asymmetry noted, uvula/palate midline, tongue midline.   Motor: 5/5 throughout, normal tone, no pronator drift noted.   Sensory - intact to soft touch throughout  Reflexes - 2+ " throughout  Coordination - no ataxia/dysmetria noted  Gait - normal      ROS:  Reviewed ROS   Review of Systems   Constitutional:  Negative for appetite change, fatigue and fever.   HENT: Negative.  Negative for hearing loss, tinnitus, trouble swallowing and voice change.    Eyes: Negative.  Negative for photophobia, pain and visual disturbance.   Respiratory: Negative.  Negative for shortness of breath.    Cardiovascular: Negative.  Negative for palpitations.   Gastrointestinal: Negative.  Negative for nausea and vomiting.   Endocrine: Negative.  Negative for cold intolerance.   Genitourinary: Negative.  Negative for dysuria, frequency and urgency.   Musculoskeletal:  Negative for back pain, gait problem, myalgias, neck pain and neck stiffness.   Skin: Negative.  Negative for rash.   Allergic/Immunologic: Negative.    Neurological:  Positive for headaches. Negative for dizziness, tremors, seizures, syncope, facial asymmetry, speech difficulty, weakness, light-headedness and numbness.   Hematological: Negative.  Does not bruise/bleed easily.   Psychiatric/Behavioral: Negative.  Negative for confusion, hallucinations and sleep disturbance.

## 2024-01-25 ENCOUNTER — PROCEDURE VISIT (OUTPATIENT)
Dept: NEUROLOGY | Facility: CLINIC | Age: 39
End: 2024-01-25
Payer: COMMERCIAL

## 2024-01-25 VITALS — TEMPERATURE: 97.4 F | DIASTOLIC BLOOD PRESSURE: 72 MMHG | SYSTOLIC BLOOD PRESSURE: 122 MMHG | HEART RATE: 78 BPM

## 2024-01-25 DIAGNOSIS — M54.81 BILATERAL OCCIPITAL NEURALGIA: Primary | ICD-10-CM

## 2024-01-25 PROCEDURE — 64405 NJX AA&/STRD GR OCPL NRV: CPT | Performed by: PSYCHIATRY & NEUROLOGY

## 2024-01-25 RX ORDER — BUPIVACAINE HYDROCHLORIDE 5 MG/ML
9 INJECTION, SOLUTION PERINEURAL ONCE
Status: COMPLETED | OUTPATIENT
Start: 2024-01-25 | End: 2024-01-25

## 2024-01-25 RX ORDER — TIZANIDINE 2 MG/1
2 TABLET ORAL
Qty: 30 TABLET | Refills: 1 | Status: SHIPPED | OUTPATIENT
Start: 2024-01-25

## 2024-01-25 RX ORDER — TRIAMCINOLONE ACETONIDE 40 MG/ML
40 INJECTION, SUSPENSION INTRA-ARTICULAR; INTRAMUSCULAR ONCE
Status: COMPLETED | OUTPATIENT
Start: 2024-01-25 | End: 2024-01-25

## 2024-01-25 RX ADMIN — BUPIVACAINE HYDROCHLORIDE 9 ML: 5 INJECTION, SOLUTION PERINEURAL at 15:15

## 2024-01-25 RX ADMIN — TRIAMCINOLONE ACETONIDE 40 MG: 40 INJECTION, SUSPENSION INTRA-ARTICULAR; INTRAMUSCULAR at 15:16

## 2024-01-25 NOTE — PROGRESS NOTES
GREATER OCCIPITAL NERVE BLOCK INJECTIONS:    NAME: Cara Negrete  MRN: [id number]  Encounter day: 24       Procedure note: I completed bilateral occipital nerve block and trigger point injections using bupivicaine (Marcaine) 0.5% 9cc and kenalog 40mg/ml 1 cc after written consent and a time out was performed.     The procedure was done under sterile conditions. patient was aware and verbalized understanding of the procedure.     Non OR time Out: yes  Correct patient identity - yes  Correct side and site - yes  Agreement on the procedure to be done - yes  Correct patient position - yes  Availability of correct implants and any special equipment or special requirements - n/a  Time out occurred prior to procedure start - yes      The side effects (risks) of MONI blocks include the followin. Allergic reactions - usually reactions at the site of the injections (two places in the back of the head), which can includes itching, swelling, redness, and soreness; it is also possible to have a severe allergic reaction with problems breathing and swallowing. We have never had a reaction this severe in the clinic, but they have been reported nationwide.    2. Numbness for an average of 16 hours. This is more of a reaction that is desired, since we are using a local anesthetic, but not everyone has the numbness and they still get the benefit of the injection (although it can be delayed a little while).    3. Minor bleeding at the side of the injections.    4. Nausea and lightheadedness in response to being injected (this could happen even if the injection material was normal saline).    5. Other possible reactions that we have never had in the clinic because we take precautions: infection at the injection site and spread of the injection material to other parts of the body.     We do the MONI blocks with steroid every two months. If needed, we can also do a MONI block without steroid at any point after a month if  the previous MONI block has worn off to give relief until the next MONI block with steroid.    The patient tolerated the procedure well. No complications were noted.           Abram Miller MD.   Staff Neurologist  Neuroimmunology and Neuro-infectious disease.   01/25/24   1:34 PM

## 2024-02-20 ENCOUNTER — TELEPHONE (OUTPATIENT)
Dept: NEUROLOGY | Facility: CLINIC | Age: 39
End: 2024-02-20

## 2024-02-20 DIAGNOSIS — M54.81 BILATERAL OCCIPITAL NEURALGIA: Primary | ICD-10-CM

## 2024-02-20 DIAGNOSIS — R51.0 POSITIONAL HEADACHE: ICD-10-CM

## 2024-02-20 NOTE — TELEPHONE ENCOUNTER
Pt called in asking for the order for an MRI to be placed. She is still having headaches and now neck pain and thinks an MRI would be a good idea.   Please assist.

## 2024-02-21 DIAGNOSIS — Z00.6 ENCOUNTER FOR EXAMINATION FOR NORMAL COMPARISON OR CONTROL IN CLINICAL RESEARCH PROGRAM: ICD-10-CM

## 2024-02-21 NOTE — TELEPHONE ENCOUNTER
"Pt had consult with Dr. Rosales on 1/10/24. Per consult notes \"could consider repeating MRI brain again but will defer to Dr. Miller at future appointment\". Pt had nerve block injections on 1/25 with Dr. Miller, and is scheduled again with Dr. Miller on 3/28. Routed to Dr. Rosales to advise if she will respond to pt's request for a MRI or if it should be forwarded to Dr. Miller.  "

## 2024-04-12 ENCOUNTER — TELEMEDICINE (OUTPATIENT)
Age: 39
End: 2024-04-12

## 2024-04-12 VITALS — TEMPERATURE: 98.8 F

## 2024-04-12 DIAGNOSIS — K52.9 GASTROENTERITIS: Primary | ICD-10-CM

## 2024-04-12 RX ORDER — ONDANSETRON 4 MG/1
4 TABLET, FILM COATED ORAL EVERY 8 HOURS PRN
Qty: 20 TABLET | Refills: 0 | Status: SHIPPED | OUTPATIENT
Start: 2024-04-12

## 2024-04-12 RX ORDER — DICYCLOMINE HCL 20 MG
20 TABLET ORAL EVERY 6 HOURS
Qty: 60 TABLET | Refills: 0 | Status: SHIPPED | OUTPATIENT
Start: 2024-04-12

## 2024-04-12 NOTE — LETTER
April 12, 2024     Patient: Cara Negrete  YOB: 1985  Date of Visit: 4/12/2024      To Whom it May Concern:    Cara Negrete is under my professional care. Cara was seen in my office on 4/12/2024.Excused from work from 4/10-4/13 Cara may return to work on 4/15/24 .    If you have any questions or concerns, please don't hesitate to call.         Sincerely,          AKSHAT Simon        CC: No Recipients

## 2024-04-12 NOTE — PROGRESS NOTES
Virtual Regular Visit    Verification of patient location:    Patient is located at Home in the following state in which I hold an active license [place]      Assessment/Plan:    Problem List Items Addressed This Visit          Digestive    Gastroenteritis - Primary     Start Bentyl and Zofran  No relief of symptoms please call office and consider stool studies  Symptoms are likely viral in origin and self limiting.  Do not use antidiarrheals.  Maintain adequate rest.  Reviewed importance of adequate fluid intake (small frequent sips) to prevent dehydration.  Encouraged use of electrolyte containing sports drinks (Gatorade or Powerade) mixed with water and take small frequent sips throughout the day.  Early refeeding can reduce illness duration and improve outcomes.  Encourage small bites, bland diet.  Avoid fatty foods, greasy foods, and dairy which all may worsen symptoms.  Discussed hand hygiene to prevent spread.  Minimize close contact with other individuals while symptomatic.  Gradual advancement of diet from bland foods to regular diet.  Call the office if symptoms worsen or do not improve.  Discussed red flag and ER precautions which need immediate eval and treat.           Relevant Medications    dicyclomine (BENTYL) 20 mg tablet    ondansetron (ZOFRAN) 4 mg tablet            Reason for visit is   Chief Complaint   Patient presents with    Virtual Regular Visit     Patient started Wednesday with diarrhea, nausea,vomiting 1 time fever no fever today though patient has only been drinking liquids    Health Screening     Depression screen    Virtual Regular Visit        Encounter provider AKSHAT Simon    Provider located at [facility] [place] [address] [place] [address] [phone]      Recent Visits  No visits were found meeting these conditions.  Showing recent visits within past 7 days and meeting all other  requirements  Today's Visits  Date Type Provider Dept   04/12/24 Telemedicine AKSHAT Simon [facility] [place]   Showing today's visits and meeting all other requirements  Future Appointments  No visits were found meeting these conditions.  Showing future appointments within next 150 days and meeting all other requirements       The patient was identified by name and date of birth. Cara Negrete was informed that this is a telemedicine visit and that the visit is being conducted through the Epic Embedded platform. She agrees to proceed..  My office door was closed. No one else was in the room.  She acknowledged consent and understanding of privacy and security of the video platform. The patient has agreed to participate and understands they can discontinue the visit at any time.    Patient is aware this is a billable service.     Subjective  Cara Negrete is a 38 y.o. female  .      Patient presents with GI upset.    Denies recent antibiotics, she reports possible suspect food.     Does reports sick contacts, kids had strep and Gi bug       Diarrhea   This is a new problem. Episode onset: Wednesday. The problem occurs 5 to 10 times per day (improving). The problem has been gradually improving. The stool consistency is described as Watery. The patient states that diarrhea does not awaken her from sleep. Associated symptoms include abdominal pain and bloating. Pertinent negatives include no arthralgias, chills, coughing, fever, headaches, increased  flatus or vomiting (resolved). Associated symptoms comments: Fever 101 when it started, but since has been normal. Risk factors include suspect food intake and ill contacts. She has tried increased fluids, bismuth subsalicylate and analgesics (all liquid diet) for the symptoms.        Past Medical History:   Diagnosis Date    ADHD (attention deficit hyperactivity disorder)     Anesthesia complication     needed a nebulizer Tx  after cholecystectomy-only happened with this surgery    Asthma     sports induced    Black stools     with abdominal pain    Breathing difficulty     Chiari malformation type II (HCC)     Clostridium difficile infection     CTS (carpal tunnel syndrome)     Eosinophilic esophagitis     GERD (gastroesophageal reflux disease)     Migraine     Papanicolaou smear for cervical cancer screening 2018    neg    Varicella     childhood       Past Surgical History:   Procedure Laterality Date    ACHILLES TENDON LENGTHENING Right      SECTION      x2    CHOLECYSTECTOMY      RI  DELIVERY ONLY N/A 2016    Procedure:  SECTION ();  Surgeon: Forrest Jhaveri MD;  Location: [facility];  Service: Obstetrics    RI  DELIVERY ONLY N/A 2021    Procedure:  SECTION () REPEAT;  Surgeon: Forrest Jhaveri MD;  [facility]: [facility];  Service: Obstetrics    RI LAPAROSCOPY SURG CHOLECYSTECTOMY N/A 2019    Procedure: CHOLECYSTECTOMY LAPAROSCOPIC;  Surgeon: Jose R Batres DO;  Location[facility];  Service: General    SALPINGECTOMY Bilateral     TUBAL LIGATION Bilateral 2021    Procedure: LIGATION/COAGULATION TUBAL;  Surgeon: Forrest Jhaveri MD;  [facility]: [facility];  Service: Obstetrics    WISDOM TOOTH EXTRACTION         Current Outpatient Medications   Medication Sig Dispense Refill    albuterol (PROVENTIL HFA,VENTOLIN HFA) 90 mcg/act inhaler Inhale 2 puffs every 6 (six) hours as needed for wheezing ProAir      dicyclomine (BENTYL) 20 mg tablet Take 1 tablet (20 mg total) by mouth every 6 (six) hours 60 tablet 0    ondansetron (ZOFRAN) 4 mg tablet Take 1 tablet (4 mg total) by mouth every 8 (eight) hours as needed for nausea or vomiting 20 tablet 0    tiZANidine (ZANAFLEX) 2 mg tablet Take 1 tablet (2 mg total) by mouth daily at bedtime as needed for muscle spasms 30 tablet 1    DULoxetine (CYMBALTA) 20 mg capsule Take 1 capsule (20 mg total) by mouth daily (Patient not taking:  Reported on 1/10/2024) 30 capsule 1    gabapentin (Neurontin) 300 mg capsule Take 1 capsule (300 mg total) by mouth daily at bedtime 30 capsule 3    pantoprazole (PROTONIX) 40 mg tablet Take 1 tablet (40 mg total) by mouth 2 (two) times a day before meals (Patient not taking: Reported on 4/12/2024) 180 tablet 0     No current facility-administered medications for this visit.        No Known Allergies    Review of Systems   Constitutional:  Negative for activity change, appetite change, chills, diaphoresis and fever.   HENT:  Negative for congestion, ear discharge, ear pain, postnasal drip, rhinorrhea, sinus pressure, sinus pain and sore throat.    Eyes:  Negative for pain, discharge, itching and visual disturbance.   Respiratory:  Negative for cough, chest tightness, shortness of breath and wheezing.    Cardiovascular:  Negative for chest pain, palpitations and leg swelling.   Gastrointestinal:  Positive for abdominal pain, bloating and diarrhea. Negative for constipation, flatus, nausea and vomiting (resolved).   Endocrine: Negative for polydipsia, polyphagia and polyuria.   Genitourinary:  Negative for difficulty urinating, dysuria and urgency.   Musculoskeletal:  Negative for arthralgias, back pain and neck pain.   Skin:  Negative for rash and wound.   Neurological:  Negative for dizziness, weakness, numbness and headaches.       Video Exam    Vitals:    04/12/24 1127   Temp: 98.8 °F (37.1 °C)   TempSrc: Temporal       Physical Exam  Constitutional:       General: She is not in acute distress.  HENT:      Mouth/Throat:      Pharynx: No oropharyngeal exudate.   Eyes:      General: No scleral icterus.  Pulmonary:      Effort: No respiratory distress.   Neurological:      Mental Status: She is alert and oriented to person, place, and time.   Psychiatric:         Behavior: Behavior normal.         Thought Content: Thought content normal.         Judgment: Judgment normal.          Visit Time  Total Visit Duration:  15

## 2024-04-12 NOTE — ASSESSMENT & PLAN NOTE
Start Bentyl and Zofran  No relief of symptoms please call office and consider stool studies  Symptoms are likely viral in origin and self limiting.  Do not use antidiarrheals.  Maintain adequate rest.  Reviewed importance of adequate fluid intake (small frequent sips) to prevent dehydration.  Encouraged use of electrolyte containing sports drinks (Gatorade or Powerade) mixed with water and take small frequent sips throughout the day.  Early refeeding can reduce illness duration and improve outcomes.  Encourage small bites, bland diet.  Avoid fatty foods, greasy foods, and dairy which all may worsen symptoms.  Discussed hand hygiene to prevent spread.  Minimize close contact with other individuals while symptomatic.  Gradual advancement of diet from bland foods to regular diet.  Call the office if symptoms worsen or do not improve.  Discussed red flag and ER precautions which need immediate eval and treat.

## 2024-04-26 ENCOUNTER — HOSPITAL ENCOUNTER (OUTPATIENT)
Dept: MRI IMAGING | Facility: HOSPITAL | Age: 39
Discharge: HOME | End: 2024-04-26
Attending: PSYCHIATRY & NEUROLOGY
Payer: COMMERCIAL

## 2024-04-26 DIAGNOSIS — M54.81 BILATERAL OCCIPITAL NEURALGIA: ICD-10-CM

## 2024-04-26 DIAGNOSIS — R51.0 POSITIONAL HEADACHE: ICD-10-CM

## 2024-04-26 PROCEDURE — 70551 MRI BRAIN STEM W/O DYE: CPT

## 2024-05-12 PROBLEM — K52.9 GASTROENTERITIS: Status: RESOLVED | Noted: 2024-04-12 | Resolved: 2024-05-12

## 2024-05-18 ENCOUNTER — APPOINTMENT (OUTPATIENT)
Dept: LAB | Facility: MEDICAL CENTER | Age: 39
End: 2024-05-18

## 2024-05-18 DIAGNOSIS — Z00.8 HEALTH EXAMINATION IN POPULATION SURVEY: ICD-10-CM

## 2024-05-18 DIAGNOSIS — Z00.6 ENCOUNTER FOR EXAMINATION FOR NORMAL COMPARISON OR CONTROL IN CLINICAL RESEARCH PROGRAM: ICD-10-CM

## 2024-05-18 LAB
CHOLEST SERPL-MCNC: 142 MG/DL
EST. AVERAGE GLUCOSE BLD GHB EST-MCNC: 108 MG/DL
HBA1C MFR BLD: 5.4 %
HDLC SERPL-MCNC: 57 MG/DL
LDLC SERPL CALC-MCNC: 72 MG/DL (ref 0–100)
NONHDLC SERPL-MCNC: 85 MG/DL
TRIGL SERPL-MCNC: 65 MG/DL

## 2024-05-18 PROCEDURE — 83036 HEMOGLOBIN GLYCOSYLATED A1C: CPT

## 2024-05-18 PROCEDURE — 36415 COLL VENOUS BLD VENIPUNCTURE: CPT

## 2024-05-18 PROCEDURE — 80061 LIPID PANEL: CPT

## 2024-06-10 ENCOUNTER — OFFICE VISIT (OUTPATIENT)
Age: 39
End: 2024-06-10
Payer: COMMERCIAL

## 2024-06-10 VITALS — BODY MASS INDEX: 36.1 KG/M2 | WEIGHT: 230 LBS | HEIGHT: 67 IN

## 2024-06-10 DIAGNOSIS — M99.04 SEGMENTAL DYSFUNCTION OF SACRAL REGION: Primary | ICD-10-CM

## 2024-06-10 DIAGNOSIS — M99.03 SEGMENTAL DYSFUNCTION OF LUMBAR REGION: ICD-10-CM

## 2024-06-10 DIAGNOSIS — M99.01 SEGMENTAL DYSFUNCTION OF CERVICAL REGION: ICD-10-CM

## 2024-06-10 DIAGNOSIS — M62.838 MUSCLE SPASM: ICD-10-CM

## 2024-06-10 DIAGNOSIS — M50.90 CERVICAL DISC DISORDER: ICD-10-CM

## 2024-06-10 DIAGNOSIS — M99.02 SEGMENTAL DYSFUNCTION OF THORACIC REGION: ICD-10-CM

## 2024-06-10 PROCEDURE — 98941 CHIROPRACT MANJ 3-4 REGIONS: CPT | Performed by: CHIROPRACTOR

## 2024-06-10 PROCEDURE — 99203 OFFICE O/P NEW LOW 30 MIN: CPT | Performed by: CHIROPRACTOR

## 2024-06-10 PROCEDURE — 97110 THERAPEUTIC EXERCISES: CPT | Performed by: CHIROPRACTOR

## 2024-06-10 NOTE — PROGRESS NOTES
Initial date of service: 6/10/24    Diagnoses and all orders for this visit:    Segmental dysfunction of sacral region    Muscle spasm    Segmental dysfunction of lumbar region    Segmental dysfunction of thoracic region    Segmental dysfunction of cervical region    Cervical disc disorder    No red flags, radiculopathy or neurologic deficit appreciated clinically. Pt's symptoms and exam findings consistent with mechanical neck/back pain secondary to repetitive st/sp injury in the setting of cervical DDD, exacerbated by postural/ergonomic stressors and core/glute deconditioning (chronic TP fx likely not a pain generator anymore; typically treated and heal like a st/sp).   Pt responded to flexion biased stretches and manual mobilization of the affected spinal and myofascial tissues with increased ROM; trial of conservative tx recommended consisting of stretching, graded mobilization/manipulation of the affected spinal and myofascial jt dysfunction, postural/ergonomic education and take home stretches/exercises. If symptoms fail to improve with short trial of conservative care, appropriate imaging and referral will be coordinated.  Spent greater than 30 min c pt discussing hx, pe, ddx, tx options and reviewing notes/imaging    TREATMENT: 11691, 36594  Fear avoidance behavior discussion; encouraged and reassured pt that natural course of condition is to improve over time with adherence to tx plan and home care strategies. Home care recommendations: avoid bed rest, walk (but avoid trails and uneven surfaces), gradual return to activity to tolerance (avoid anything that peripheralizes symptoms), call if symptoms peripheralize, worsen, or neurologic deficit progresses. Ther-ex: IASTM; discussed post procedure soreness and/or ecchymosis for up to 36 hrs, applied to affected mm hypertonicities; supine hamstring stretch, supine gluteal stretch, side laying QL stretch, single knee to chest stretch, lev scpa stretch, axial  retraction/extension, transitional mvmt education, abdominal bracing; greater than 15 min spent performing above mentioned ther-ex to improve ROM/flexibility. Cervical supine graded mobilization, traction, Thoracic mobilization/manipulation: prone P-A mob, supine A-P manip; Lumbar mobilization/manipulation: diversified side laying graded HVLA, flexion-traction; SIJ Manipulation/Mobilization: R/L SIJ HVLA - long axis distraction, cordova drop table maneuver to affected SIJ    HPI:  Cara Negrete is a 39 y.o. female  Chief Complaint   Patient presents with    Neck - Pain     Neck pain that radiates down both shoulders. Patient states intermittent shooting and dull pain. Pain score 4    Back Pain     Lower lumbar pain that radiates down buttocks.  Patient states constant dull pain.   Pain score 6-7       Pt presents for eval and tx for exacerbation of chronic intermittent neck/back pain. Imaging demonstrates TP fractures L2/3 from horseback riding fall; mild disc disorder c/s; worsened after two c-sections    Back Pain  This is a chronic problem. The current episode started more than 1 year ago. The problem occurs daily. The problem has been waxing and waning since onset. The pain is present in the thoracic spine, sacro-iliac, lumbar spine and gluteal. The quality of the pain is described as aching and stabbing. The pain radiates to the left thigh. The symptoms are aggravated by standing and sitting (laying supine; palliative includes icyhot).   Neck Pain   This is a chronic problem. The current episode started more than 1 year ago. The problem occurs daily. The problem has been waxing and waning. The pain is present in the left side, midline and right side. The quality of the pain is described as aching and stabbing. The symptoms are aggravated by bending, position, stress and twisting.     Past Medical History:   Diagnosis Date    ADHD (attention deficit hyperactivity disorder)     Anesthesia complication      needed a nebulizer Tx after cholecystectomy-only happened with this surgery    Asthma     sports induced    Black stools     with abdominal pain    Breathing difficulty     Chiari malformation type II (HCC)     Clostridium difficile infection     CTS (carpal tunnel syndrome)     Eosinophilic esophagitis     GERD (gastroesophageal reflux disease)     Migraine     Papanicolaou smear for cervical cancer screening 2018    neg    Varicella     childhood      Past Surgical History:   Procedure Laterality Date    ACHILLES TENDON LENGTHENING Right      SECTION      x2    CHOLECYSTECTOMY      UT  DELIVERY ONLY N/A 2016    Procedure:  SECTION ();  Surgeon: Forrest Jhaveri MD;  Location: [facility];  Service: Obstetrics    UT  DELIVERY ONLY N/A 2021    Procedure:  SECTION () REPEAT;  Surgeon: Forrest Jhaveri MD;  [facility]: [facility];  Service: Obstetrics    UT LAPAROSCOPY SURG CHOLECYSTECTOMY N/A 2019    Procedure: CHOLECYSTECTOMY LAPAROSCOPIC;  Surgeon: Jose R Batres DO;  Location: [facility];  Service: General    SALPINGECTOMY Bilateral     TUBAL LIGATION Bilateral 2021    Procedure: LIGATION/COAGULATION TUBAL;  Surgeon: Forrest Jhaveri MD;  [facility]: [facility];  Service: Obstetrics    WISDOM TOOTH EXTRACTION       The following portions of the patient's history were reviewed and updated as appropriate: allergies, past family history, past medical history, past social history, past surgical history, and problem list.  Review of Systems   Musculoskeletal:  Positive for back pain and neck pain.     Physical Exam  Eyes:      Extraocular Movements: Extraocular movements intact.   Neck:        Comments: Pnful and limited in Rrot contra, Lrot ipsi, Fl  Cardiovascular:      Pulses: Normal pulses.   Abdominal:      General: There is no distension.      Tenderness: There is no abdominal tenderness.   Musculoskeletal:      Cervical back: Pain with movement and muscular  tenderness present. Decreased range of motion.      Thoracic back: Spasms and tenderness present. Decreased range of motion.      Lumbar back: Spasms and tenderness present. Decreased range of motion. Negative right straight leg raise test and negative left straight leg raise test.        Back:       Comments: Pnful and limited Ext   Lymphadenopathy:      Cervical: No cervical adenopathy.   Skin:     General: Skin is warm and dry.   Neurological:      Mental Status: She is alert and oriented to person, place, and time.      Cranial Nerves: Cranial nerves 2-12 are intact.      Sensory: Sensation is intact.      Motor: Motor function is intact.      Coordination: Coordination is intact.      Gait: Gait is intact. Gait and tandem walk normal.      Deep Tendon Reflexes: Reflexes normal. Babinski sign absent on the right side. Babinski sign absent on the left side.      Reflex Scores:       Tricep reflexes are 2+ on the right side and 2+ on the left side.       Bicep reflexes are 2+ on the right side and 2+ on the left side.       Brachioradialis reflexes are 2+ on the right side and 2+ on the left side.       Patellar reflexes are 2+ on the right side and 2+ on the left side.       Achilles reflexes are 2+ on the right side and 2+ on the left side.  Psychiatric:         Mood and Affect: Mood and affect normal.         Behavior: Behavior normal.     SOFT TISSUE ASSESSMENT Hypertonicity and tenderness palpated B C5-T3, L2-S1 erector spinae, lev scap, glute med/min, QL, hamstring JOINT RESTRICTIONS: C5-T3, L2-S1 and R/L SIJ ORTHO: SI jt point tenderness: +; Rylee unremarkable for centralization/peripheralization; yovana's, iliac compression, thigh thrust elicit lbp in R/L SIJ; prone femoral nerve stretch neg for upper lumbar neural tension, elicits R/L SIJ stiffness; sitting root elicits no lbp on R/L; slump test elicits no neural tension R/L    Return in about 1 week (around 6/17/2024) for Next scheduled follow up.

## 2024-06-13 LAB
APOB+LDLR+PCSK9 GENE MUT ANL BLD/T: NOT DETECTED
BRCA1+BRCA2 DEL+DUP + FULL MUT ANL BLD/T: NOT DETECTED
MLH1+MSH2+MSH6+PMS2 GN DEL+DUP+FUL M: NOT DETECTED

## 2024-07-22 ENCOUNTER — ANNUAL EXAM (OUTPATIENT)
Dept: GYNECOLOGY | Facility: CLINIC | Age: 39
End: 2024-07-22
Payer: COMMERCIAL

## 2024-07-22 VITALS
SYSTOLIC BLOOD PRESSURE: 122 MMHG | BODY MASS INDEX: 37.35 KG/M2 | DIASTOLIC BLOOD PRESSURE: 80 MMHG | WEIGHT: 238 LBS | HEIGHT: 67 IN

## 2024-07-22 DIAGNOSIS — Z12.31 SCREENING MAMMOGRAM FOR BREAST CANCER: Primary | ICD-10-CM

## 2024-07-22 DIAGNOSIS — Z01.419 ENCOUNTER FOR GYNECOLOGICAL EXAMINATION WITHOUT ABNORMAL FINDING: ICD-10-CM

## 2024-07-22 DIAGNOSIS — N92.0 MENORRHAGIA WITH REGULAR CYCLE: ICD-10-CM

## 2024-07-22 PROCEDURE — S0612 ANNUAL GYNECOLOGICAL EXAMINA: HCPCS | Performed by: OBSTETRICS & GYNECOLOGY

## 2024-07-22 NOTE — PROGRESS NOTES
"Ambulatory Visit  Name: Cara Negrete      : 1985      MRN: [id number]  Encounter Provider: Forrest Jhaveri MD  Encounter Date: 2024   Encounter department: [facility] [place]    Assessment & Plan     Normal breast and GYN exam  Menorrhagia with regular menstrual cycles x 2 months.  Normal mammogram 2023  Normal colonoscopy 10/22.  Normal Pap smear negative HPV 2023      Plan: Check CBC.  Recommend ibuprofen 600 mg every 6 hours during the first 2 days of her menstrual cycles.  Call with update.  1. Screening mammogram for breast cancer  -     Mammo screening bilateral w 3d & cad; Future; Expected date: 10/24/2024      History of Present Illness  c/s    Cara Negrete is a 39 y.o. female who presents for annual exam complaining of heavier periods over the last 2 months.  Denies any pelvic pain.  Denies any increased cramping.  Her menstrual cycles last 3 days and has large clots on day 2.  Not taking any ibuprofen.  Denies any breast bowel or bladder problems.  No change in family history.  Mother (breast cancer).  Medications reviewed.  Working full-time from home.  Children are 4 and 8 and doing well.    Objective     /80   Ht 5' 7\" (1.702 m)   Wt 108 kg (238 lb)   LMP 2024 (Exact Date)   BMI 37.28 kg/m²     Physical Exam  Vitals and nursing note reviewed.   Constitutional:       General: She is not in acute distress.     Appearance: She is well-developed.   HENT:      Head: Normocephalic and atraumatic.   Eyes:      Conjunctiva/sclera: Conjunctivae normal.   Cardiovascular:      Rate and Rhythm: Normal rate and regular rhythm.      Heart sounds: No murmur heard.  Pulmonary:      Effort: Pulmonary effort is normal. No respiratory distress.      Breath sounds: Normal breath sounds.   Abdominal:      Palpations: Abdomen is soft.      Tenderness: There is no abdominal tenderness.   Genitourinary:     Vagina: Normal.      Cervix: Normal.      " Uterus: Normal.       Adnexa: Right adnexa normal and left adnexa normal.      Comments: External genitalia normal.  No uterine prolapse cystocele or rectocele.  Musculoskeletal:         General: No swelling.      Cervical back: Neck supple.   Skin:     General: Skin is warm and dry.      Capillary Refill: Capillary refill takes less than 2 seconds.   Neurological:      Mental Status: She is alert.   Psychiatric:         Mood and Affect: Mood normal.       Administrative Statements

## 2024-08-10 ENCOUNTER — APPOINTMENT (OUTPATIENT)
Dept: LAB | Facility: MEDICAL CENTER | Age: 39
End: 2024-08-10
Payer: COMMERCIAL

## 2024-08-10 DIAGNOSIS — N92.0 MENORRHAGIA WITH REGULAR CYCLE: ICD-10-CM

## 2024-08-10 LAB
BASOPHILS # BLD AUTO: 0.04 THOUSANDS/ÂΜL (ref 0–0.1)
BASOPHILS NFR BLD AUTO: 1 % (ref 0–1)
EOSINOPHIL # BLD AUTO: 0.24 THOUSAND/ÂΜL (ref 0–0.61)
EOSINOPHIL NFR BLD AUTO: 4 % (ref 0–6)
ERYTHROCYTE [DISTWIDTH] IN BLOOD BY AUTOMATED COUNT: 14.7 % (ref 11.6–15.1)
HCT VFR BLD AUTO: 38.8 % (ref 34.8–46.1)
HGB BLD-MCNC: 12.1 G/DL (ref 11.5–15.4)
IMM GRANULOCYTES # BLD AUTO: 0.01 THOUSAND/UL (ref 0–0.2)
IMM GRANULOCYTES NFR BLD AUTO: 0 % (ref 0–2)
LYMPHOCYTES # BLD AUTO: 2.88 THOUSANDS/ÂΜL (ref 0.6–4.47)
LYMPHOCYTES NFR BLD AUTO: 44 % (ref 14–44)
MCH RBC QN AUTO: 25.3 PG (ref 26.8–34.3)
MCHC RBC AUTO-ENTMCNC: 31.2 G/DL (ref 31.4–37.4)
MCV RBC AUTO: 81 FL (ref 82–98)
MONOCYTES # BLD AUTO: 0.61 THOUSAND/ÂΜL (ref 0.17–1.22)
MONOCYTES NFR BLD AUTO: 10 % (ref 4–12)
NEUTROPHILS # BLD AUTO: 2.66 THOUSANDS/ÂΜL (ref 1.85–7.62)
NEUTS SEG NFR BLD AUTO: 41 % (ref 43–75)
NRBC BLD AUTO-RTO: 0 /100 WBCS
PLATELET # BLD AUTO: 240 THOUSANDS/UL (ref 149–390)
PMV BLD AUTO: 11.5 FL (ref 8.9–12.7)
RBC # BLD AUTO: 4.78 MILLION/UL (ref 3.81–5.12)
WBC # BLD AUTO: 6.44 THOUSAND/UL (ref 4.31–10.16)

## 2024-08-10 PROCEDURE — 85025 COMPLETE CBC W/AUTO DIFF WBC: CPT

## 2024-08-10 PROCEDURE — 36415 COLL VENOUS BLD VENIPUNCTURE: CPT

## 2024-08-14 ENCOUNTER — TELEPHONE (OUTPATIENT)
Age: 39
End: 2024-08-14

## 2024-08-14 NOTE — TELEPHONE ENCOUNTER
Patient returning phone call regarding lab results from 08/10/2024 CBC and Diff. Provider  interpreted results, I read verbatim providers message.    Forrest Jhaveri MD  8/12/2024  8:29 AM EDT       Not anemic.  Hemoglobin is 12.1.  Plan: Trial of Motrin as instructed at last visit.         Patient had no additional questions. Patient is asking for motrin to be sent to pharmacy on chart [facility].     Patient also wanted to inform provider that  menstrual came a week and three days earlier, it was a little heavier than normal, patient did not have motrin during her last menstrual.

## 2025-01-24 ENCOUNTER — OFFICE VISIT (OUTPATIENT)
Dept: INTERNAL MEDICINE CLINIC | Age: 40
End: 2025-01-24
Payer: COMMERCIAL

## 2025-01-24 VITALS
WEIGHT: 243.2 LBS | SYSTOLIC BLOOD PRESSURE: 110 MMHG | HEIGHT: 67 IN | BODY MASS INDEX: 38.17 KG/M2 | HEART RATE: 72 BPM | OXYGEN SATURATION: 92 % | DIASTOLIC BLOOD PRESSURE: 80 MMHG

## 2025-01-24 DIAGNOSIS — Z13.228 SCREENING FOR METABOLIC DISORDER: ICD-10-CM

## 2025-01-24 DIAGNOSIS — J45.30 MILD PERSISTENT ASTHMA WITHOUT COMPLICATION: ICD-10-CM

## 2025-01-24 DIAGNOSIS — Z00.00 ANNUAL PHYSICAL EXAM: Primary | ICD-10-CM

## 2025-01-24 DIAGNOSIS — Z86.2 HX OF IRON DEFICIENCY ANEMIA: ICD-10-CM

## 2025-01-24 DIAGNOSIS — F41.9 ANXIETY: ICD-10-CM

## 2025-01-24 DIAGNOSIS — R53.83 FATIGUE, UNSPECIFIED TYPE: ICD-10-CM

## 2025-01-24 DIAGNOSIS — Z13.220 SCREENING FOR LIPID DISORDERS: ICD-10-CM

## 2025-01-24 DIAGNOSIS — R20.9 COLD FEET: ICD-10-CM

## 2025-01-24 PROCEDURE — 99213 OFFICE O/P EST LOW 20 MIN: CPT

## 2025-01-24 PROCEDURE — 99396 PREV VISIT EST AGE 40-64: CPT

## 2025-01-24 RX ORDER — FLUOXETINE 10 MG/1
CAPSULE ORAL
Qty: 46 CAPSULE | Refills: 0 | Status: SHIPPED | OUTPATIENT
Start: 2025-01-24 | End: 2025-02-23

## 2025-01-24 RX ORDER — FLUTICASONE PROPIONATE 44 UG/1
AEROSOL, METERED RESPIRATORY (INHALATION)
COMMUNITY
End: 2025-01-24

## 2025-01-24 NOTE — PROGRESS NOTES
Adult Annual Physical  Name: Cara Negrete      : 1985      MRN: [id number]  Encounter Provider: Olga Briseno PA-C  Encounter Date: 2025   Encounter department: [facility] [place]    Assessment & Plan  Annual physical exam  Discussed healthy diet and exercise habits  Discussed appropriate age based screenings  Encouraged to schedule Mammo- order is in chart  Immunizations reviewed  Routine fasting labs ordered         Anxiety  Patient noting worsening anxiety and frustration at home  She is interested in starting medication for her mental health.  Discussed several options including SSRIs/SNRIs, lesion, BuSpar  Patient elects to start Prozac.  Will start 10 mg daily x 2 weeks, then increase to 20 mg daily  Discussed side effects  Educated can take 4 to 8 weeks to notice improvement in symptoms  Denies SI/HI today  Follow-up 4 to 6 weeks or sooner if symptoms worsen  Orders:    FLUoxetine (PROzac) 10 mg capsule; Take 1 capsule (10 mg total) by mouth daily for 14 days, THEN 2 capsules (20 mg total) daily for 16 days.    Ambulatory referral to Psych Services; Future    Hx of iron deficiency anemia  Previous CBC reviewed showing no signs of anemia, however macrocytic and hyperchromic cells  Will order iron panel  Orders:    Iron Panel (Includes Ferritin, Iron Sat%, Iron, and TIBC); Future    Fatigue, unspecified type  Encouraged adequate sleep, hydration, nutrition  Will order vitamin D lab.  Patient educated to call insurance to confirm coverage prior to obtaining labs  Orders:    Vitamin D 25 hydroxy; Future    Screening for lipid disorders    Orders:    Lipid Panel with Direct LDL reflex; Future    Screening for metabolic disorder    Orders:    TSH, 3rd generation with Free T4 reflex; Future    Comprehensive metabolic panel; Future    CBC and differential; Future    Cold feet  Patient endorsing cold feet, discoloration of feet  DP and PT pulses 2+ on exam, capillary  refill less than 2 seconds  Offered ESR/CRP testing.  Patient defers at this time  She notes she will follow-up with podiatry       Mild persistent asthma without complication  Well-controlled.  No recent exacerbation.  Continue albuterol as needed           Immunizations and preventive care screenings were discussed with patient today. Appropriate education was printed on patient's after visit summary.    Counseling:  Alcohol/drug use: discussed moderation in alcohol intake, the recommendations for healthy alcohol use, and avoidance of illicit drug use.  Dental Health: discussed importance of regular tooth brushing, flossing, and dental visits.  Injury prevention: discussed safety/seat belts, safety helmets, smoke detectors, carbon monoxide detectors, and smoking near bedding or upholstery.  Sexual health: discussed sexually transmitted diseases, partner selection, use of condoms, avoidance of unintended pregnancy, and contraceptive alternatives.  Exercise: the importance of regular exercise/physical activity was discussed. Recommend exercise 3-5 times per week for at least 30 minutes.       Depression Screening and Follow-up Plan: Patient was screened for depression during today's encounter. They screened negative with a PHQ-2 score of 0.        History of Present Illness     Adult Annual Physical:  Patient presents for annual physical. Patient is a 39 year old female presenting to the office for annual physical and overdue follow up     Anxiety  Patient reproting increased anger/frsturation, anxiety over the last several months  Reports history of ADHD- issues with focusing.  She was diagnosed in high school and treated with Ritalin, then switched Wellbutrin.  She does note she was also treated with Prozac in the past.  She has not been on mental health medication for several years  Patient denies depression symptoms  Triggers: She reports she does have a son who has behavioral issues.  She is working to get him  "into psychiatry today  Denies SI/HI  Denies AH/VH       Patient also notes discoloration of the feet, notes cold feet  She notes she does try to wear warm boots, warm socks, keep her feet warm  Denies history of rheumatological disorders    .     Diet and Physical Activity:  - Diet/Nutrition: consuming 3-5 servings of fruits/vegetables daily and well balanced diet.  - Exercise: 1-2 times a week on average and walking.    Depression Screening:  - PHQ-2 Score: 0    General Health:  - Sleep: 4-6 hours of sleep on average. having night sweats  - Hearing: normal hearing bilateral ears.  - Vision: most recent eye exam > 1 year ago and wears glasses. for screen time  - Dental: no dental visits for > 1 year and brushes teeth twice daily.    /GYN Health:  - Follows with GYN: yes.             Review of Systems   Constitutional:  Positive for fatigue. Negative for chills and fever.   HENT:  Negative for congestion, ear pain, postnasal drip, rhinorrhea, sinus pressure, sore throat and trouble swallowing.    Eyes:  Negative for pain and visual disturbance.   Respiratory:  Negative for cough, shortness of breath and wheezing.    Cardiovascular:  Negative for chest pain, palpitations and leg swelling.   Gastrointestinal:  Negative for abdominal pain, constipation, diarrhea, nausea and vomiting.   Genitourinary:  Negative for difficulty urinating, dysuria, frequency, hematuria and urgency.   Musculoskeletal:  Negative for arthralgias and back pain.   Neurological:  Negative for dizziness, weakness, light-headedness, numbness and headaches.   Psychiatric/Behavioral:  Positive for sleep disturbance. Negative for dysphoric mood. The patient is nervous/anxious.    All other systems reviewed and are negative.    Medical History Reviewed by provider this encounter:  Tobacco  Allergies  Meds  Problems  Med Hx  Surg Hx  Fam Hx     .    Objective   /80 (Patient Position: Sitting, Cuff Size: Adult)   Pulse 72   Ht 5' 7\" " (1.702 m)   Wt 110 kg (243 lb 3.2 oz)   SpO2 92%   BMI 38.09 kg/m²     Physical Exam  Vitals and nursing note reviewed.   Constitutional:       General: She is not in acute distress.     Appearance: Normal appearance. She is not ill-appearing.   HENT:      Head: Normocephalic and atraumatic.      Right Ear: Tympanic membrane, ear canal and external ear normal.      Left Ear: Tympanic membrane, ear canal and external ear normal.      Nose: Nose normal.      Mouth/Throat:      Mouth: Mucous membranes are moist.      Pharynx: Oropharynx is clear. No oropharyngeal exudate or posterior oropharyngeal erythema.   Eyes:      General: No scleral icterus.     Extraocular Movements: Extraocular movements intact.      Conjunctiva/sclera: Conjunctivae normal.      Pupils: Pupils are equal, round, and reactive to light.   Neck:      Thyroid: No thyroid mass or thyromegaly.   Cardiovascular:      Rate and Rhythm: Normal rate and regular rhythm.      Pulses:           Radial pulses are 2+ on the right side and 2+ on the left side.        Dorsalis pedis pulses are 2+ on the right side and 2+ on the left side.        Posterior tibial pulses are 2+ on the right side and 2+ on the left side.      Heart sounds: Normal heart sounds. No murmur heard.     No friction rub. No gallop.   Pulmonary:      Effort: Pulmonary effort is normal. No respiratory distress.      Breath sounds: Normal breath sounds. No wheezing, rhonchi or rales.   Chest:      Chest wall: No tenderness.   Musculoskeletal:         General: Normal range of motion.      Cervical back: Normal range of motion. No tenderness.      Right lower leg: No edema.      Left lower leg: No edema.   Lymphadenopathy:      Cervical: No cervical adenopathy.   Skin:     General: Skin is warm and dry.      Coloration: Skin is not pale.      Findings: No bruising, erythema, lesion or rash.   Neurological:      General: No focal deficit present.      Mental Status: She is alert and oriented  to person, place, and time. Mental status is at baseline.      Cranial Nerves: No cranial nerve deficit.      Motor: No weakness.      Coordination: Coordination normal.   Psychiatric:         Mood and Affect: Mood normal.         Behavior: Behavior normal.           Disclaimer: This note was generated with voice recognition software.  Phonetic, grammatical, and spelling errors may be present as a result.  Please contact provider with any concerns or questions

## 2025-01-24 NOTE — PATIENT INSTRUCTIONS
"Patient Education     Routine physical for adults   The Basics   Written by the doctors and editors at [facility]   What is a physical? -- A physical is a routine visit, or \"check-up,\" with your doctor. You might also hear it called a \"wellness visit\" or \"preventive visit.\"  During each visit, the doctor will:   Ask about your physical and mental health   Ask about your habits, behaviors, and lifestyle   Do an exam   Give you vaccines if needed   Talk to you about any medicines you take   Give advice about your health   Answer your questions  Getting regular check-ups is an important part of taking care of your health. It can help your doctor find and treat any problems you have. But it's also important for preventing health problems.  A routine physical is different from a \"sick visit.\" A sick visit is when you see a doctor because of a health concern or problem. Since physicals are scheduled ahead of time, you can think about what you want to ask the doctor.  How often should I get a physical? -- It depends on your age and health. In general, for people age 21 years and older:   If you are younger than 50 years, you might be able to get a physical every 3 years.   If you are 50 years or older, your doctor might recommend a physical every year.  If you have an ongoing health condition, like diabetes or high blood pressure, your doctor will probably want to see you more often.  What happens during a physical? -- In general, each visit will include:   Physical exam - The doctor or nurse will check your height, weight, heart rate, and blood pressure. They will also look at your eyes and ears. They will ask about how you are feeling and whether you have any symptoms that bother you.   Medicines - It's a good idea to bring a list of all the medicines you take to each doctor visit. Your doctor will talk to you about your medicines and answer any questions. Tell them if you are having any side effects that bother you. You " "should also tell them if you are having trouble paying for any of your medicines.   Habits and behaviors - This includes:   Your diet   Your exercise habits   Whether you smoke, drink alcohol, or use drugs   Whether you are sexually active   Whether you feel safe at home  Your doctor will talk to you about things you can do to improve your health and lower your risk of health problems. They will also offer help and support. For example, if you want to quit smoking, they can give you advice and might prescribe medicines. If you want to improve your diet or get more physical activity, they can help you with this, too.   Lab tests, if needed - The tests you get will depend on your age and situation. For example, your doctor might want to check your:   Cholesterol   Blood sugar   Iron level   Vaccines - The recommended vaccines will depend on your age, health, and what vaccines you already had. Vaccines are very important because they can prevent certain serious or deadly infections.   Discussion of screening - \"Screening\" means checking for diseases or other health problems before they cause symptoms. Your doctor can recommend screening based on your age, risk, and preferences. This might include tests to check for:   Cancer, such as breast, prostate, cervical, ovarian, colorectal, prostate, lung, or skin cancer   Sexually transmitted infections, such as chlamydia and gonorrhea   Mental health conditions like depression and anxiety  Your doctor will talk to you about the different types of screening tests. They can help you decide which screenings to have. They can also explain what the results might mean.   Answering questions - The physical is a good time to ask the doctor or nurse questions about your health. If needed, they can refer you to other doctors or specialists, too.  Adults older than 65 years often need other care, too. As you get older, your doctor will talk to you about:   How to prevent falling at " home   Hearing or vision tests   Memory testing   How to take your medicines safely   Making sure that you have the help and support you need at home  All topics are updated as new evidence becomes available and our peer review process is complete.  This topic retrieved from [employer] on: May 02, 2024.  Topic 845212 Version 1.0  Release: 32.4.3 - C32.122  © 2024 UpToDate, Inc. and/or its affiliates. All rights reserved.  Consumer Information Use and Disclaimer   Disclaimer: This generalized information is a limited summary of diagnosis, treatment, and/or medication information. It is not meant to be comprehensive and should be used as a tool to help the user understand and/or assess potential diagnostic and treatment options. It does NOT include all information about conditions, treatments, medications, side effects, or risks that may apply to a specific patient. It is not intended to be medical advice or a substitute for the medical advice, diagnosis, or treatment of a health care provider based on the health care provider's examination and assessment of a patient's specific and unique circumstances. Patients must speak with a health care provider for complete information about their health, medical questions, and treatment options, including any risks or benefits regarding use of medications. This information does not endorse any treatments or medications as safe, effective, or approved for treating a specific patient. UpToDate, Inc. and its affiliates disclaim any warranty or liability relating to this information or the use thereof.The use of this information is governed by the Terms of Use, available at https://www.woltersKiteDeskuwer.com/en/know/clinical-effectiveness-terms. 2024© UpToDate, Inc. and its affiliates and/or licensors. All rights reserved.  Copyright   © 2024 UpToDate, Inc. and/or its affiliates. All rights reserved.

## 2025-01-25 ENCOUNTER — APPOINTMENT (OUTPATIENT)
Dept: LAB | Facility: MEDICAL CENTER | Age: 40
End: 2025-01-25
Payer: COMMERCIAL

## 2025-01-25 DIAGNOSIS — Z86.2 HX OF IRON DEFICIENCY ANEMIA: ICD-10-CM

## 2025-01-25 DIAGNOSIS — Z13.228 SCREENING FOR METABOLIC DISORDER: ICD-10-CM

## 2025-01-25 DIAGNOSIS — R53.83 FATIGUE, UNSPECIFIED TYPE: ICD-10-CM

## 2025-01-25 DIAGNOSIS — Z13.220 SCREENING FOR LIPID DISORDERS: ICD-10-CM

## 2025-01-25 LAB
ALBUMIN SERPL BCG-MCNC: 4.1 G/DL (ref 3.5–5)
ALP SERPL-CCNC: 62 U/L (ref 34–104)
ALT SERPL W P-5'-P-CCNC: 21 U/L (ref 7–52)
ANION GAP SERPL CALCULATED.3IONS-SCNC: 7 MMOL/L (ref 4–13)
AST SERPL W P-5'-P-CCNC: 24 U/L (ref 13–39)
BASOPHILS # BLD AUTO: 0.07 THOUSANDS/ΜL (ref 0–0.1)
BASOPHILS NFR BLD AUTO: 1 % (ref 0–1)
BILIRUB SERPL-MCNC: 0.47 MG/DL (ref 0.2–1)
BUN SERPL-MCNC: 10 MG/DL (ref 5–25)
CALCIUM SERPL-MCNC: 9.1 MG/DL (ref 8.4–10.2)
CHLORIDE SERPL-SCNC: 107 MMOL/L (ref 96–108)
CHOLEST SERPL-MCNC: 161 MG/DL (ref ?–200)
CO2 SERPL-SCNC: 28 MMOL/L (ref 21–32)
CREAT SERPL-MCNC: 0.94 MG/DL (ref 0.6–1.3)
EOSINOPHIL # BLD AUTO: 0.28 THOUSAND/ΜL (ref 0–0.61)
EOSINOPHIL NFR BLD AUTO: 4 % (ref 0–6)
ERYTHROCYTE [DISTWIDTH] IN BLOOD BY AUTOMATED COUNT: 14.6 % (ref 11.6–15.1)
FERRITIN SERPL-MCNC: 15 NG/ML (ref 11–307)
GFR SERPL CREATININE-BSD FRML MDRD: 75 ML/MIN/1.73SQ M
GLUCOSE P FAST SERPL-MCNC: 92 MG/DL (ref 65–99)
HCT VFR BLD AUTO: 40.5 % (ref 34.8–46.1)
HDLC SERPL-MCNC: 63 MG/DL
HGB BLD-MCNC: 12.6 G/DL (ref 11.5–15.4)
IMM GRANULOCYTES # BLD AUTO: 0.02 THOUSAND/UL (ref 0–0.2)
IMM GRANULOCYTES NFR BLD AUTO: 0 % (ref 0–2)
IRON SATN MFR SERPL: 9 % (ref 15–50)
IRON SERPL-MCNC: 43 UG/DL (ref 50–212)
LDLC SERPL CALC-MCNC: 85 MG/DL (ref 0–100)
LYMPHOCYTES # BLD AUTO: 2.92 THOUSANDS/ΜL (ref 0.6–4.47)
LYMPHOCYTES NFR BLD AUTO: 42 % (ref 14–44)
MCH RBC QN AUTO: 25.6 PG (ref 26.8–34.3)
MCHC RBC AUTO-ENTMCNC: 31.1 G/DL (ref 31.4–37.4)
MCV RBC AUTO: 82 FL (ref 82–98)
MONOCYTES # BLD AUTO: 0.72 THOUSAND/ΜL (ref 0.17–1.22)
MONOCYTES NFR BLD AUTO: 10 % (ref 4–12)
NEUTROPHILS # BLD AUTO: 3.01 THOUSANDS/ΜL (ref 1.85–7.62)
NEUTS SEG NFR BLD AUTO: 43 % (ref 43–75)
NRBC BLD AUTO-RTO: 0 /100 WBCS
PLATELET # BLD AUTO: 275 THOUSANDS/UL (ref 149–390)
PMV BLD AUTO: 11.3 FL (ref 8.9–12.7)
POTASSIUM SERPL-SCNC: 4.4 MMOL/L (ref 3.5–5.3)
PROT SERPL-MCNC: 7.2 G/DL (ref 6.4–8.4)
RBC # BLD AUTO: 4.93 MILLION/UL (ref 3.81–5.12)
SODIUM SERPL-SCNC: 142 MMOL/L (ref 135–147)
TIBC SERPL-MCNC: 455 UG/DL (ref 250–450)
TRANSFERRIN SERPL-MCNC: 325 MG/DL (ref 203–362)
TRIGL SERPL-MCNC: 64 MG/DL (ref ?–150)
TSH SERPL DL<=0.05 MIU/L-ACNC: 1.08 UIU/ML (ref 0.45–4.5)
UIBC SERPL-MCNC: 412 UG/DL (ref 155–355)
WBC # BLD AUTO: 7.02 THOUSAND/UL (ref 4.31–10.16)

## 2025-01-25 PROCEDURE — 84443 ASSAY THYROID STIM HORMONE: CPT

## 2025-01-25 PROCEDURE — 83540 ASSAY OF IRON: CPT

## 2025-01-25 PROCEDURE — 80061 LIPID PANEL: CPT

## 2025-01-25 PROCEDURE — 83550 IRON BINDING TEST: CPT

## 2025-01-25 PROCEDURE — 80053 COMPREHEN METABOLIC PANEL: CPT

## 2025-01-25 PROCEDURE — 82728 ASSAY OF FERRITIN: CPT

## 2025-01-25 PROCEDURE — 36415 COLL VENOUS BLD VENIPUNCTURE: CPT

## 2025-01-25 PROCEDURE — 85025 COMPLETE CBC W/AUTO DIFF WBC: CPT

## 2025-01-27 ENCOUNTER — RESULTS FOLLOW-UP (OUTPATIENT)
Dept: INTERNAL MEDICINE CLINIC | Facility: OTHER | Age: 40
End: 2025-01-27

## 2025-01-28 DIAGNOSIS — F41.9 ANXIETY: ICD-10-CM

## 2025-01-28 RX ORDER — FLUOXETINE 10 MG/1
CAPSULE ORAL
Qty: 46 CAPSULE | Refills: 0 | Status: SHIPPED | OUTPATIENT
Start: 2025-01-28 | End: 2025-02-26

## 2025-01-28 NOTE — TELEPHONE ENCOUNTER
Medication went to the wrong pharmacy. Please resend to [facility].    Medication:     FLUoxetine (PROzac) 10 mg capsule       Dose/Frequency: Take 1 capsule (10 mg total) by mouth daily for 14 days, THEN 2 capsules (20 mg total) daily for 16 days.     Quantity: 46 capsule     Pharmacy: [facility] - [place] - [address]     Office:   [x] PCP/Provider - Olga Briseno PA-C   [] Speciality/Provider -     Does the patient have enough for 3 days?   [] Yes   [x] No - Send as HP to POD

## 2025-02-10 ENCOUNTER — TELEPHONE (OUTPATIENT)
Age: 40
End: 2025-02-10

## 2025-02-10 NOTE — TELEPHONE ENCOUNTER
Spoke to pt in regards to routine referral. Pt is interested in Therapy Anywhere.     Referral closed.

## 2025-02-17 ENCOUNTER — OFFICE VISIT (OUTPATIENT)
Dept: OBGYN CLINIC | Facility: CLINIC | Age: 40
End: 2025-02-17
Payer: COMMERCIAL

## 2025-02-17 ENCOUNTER — APPOINTMENT (OUTPATIENT)
Dept: RADIOLOGY | Facility: AMBULARY SURGERY CENTER | Age: 40
End: 2025-02-17
Attending: ORTHOPAEDIC SURGERY
Payer: COMMERCIAL

## 2025-02-17 VITALS — BODY MASS INDEX: 38.14 KG/M2 | WEIGHT: 243 LBS | HEIGHT: 67 IN

## 2025-02-17 DIAGNOSIS — M17.11 PATELLOFEMORAL ARTHRITIS OF RIGHT KNEE: ICD-10-CM

## 2025-02-17 DIAGNOSIS — M17.10 PATELLOFEMORAL ARTHRITIS: Primary | ICD-10-CM

## 2025-02-17 DIAGNOSIS — M25.562 LEFT KNEE PAIN, UNSPECIFIED CHRONICITY: ICD-10-CM

## 2025-02-17 DIAGNOSIS — M25.561 BILATERAL CHRONIC KNEE PAIN: ICD-10-CM

## 2025-02-17 DIAGNOSIS — M25.562 BILATERAL CHRONIC KNEE PAIN: ICD-10-CM

## 2025-02-17 DIAGNOSIS — Q68.2 PATELLA ALTA: ICD-10-CM

## 2025-02-17 DIAGNOSIS — G89.29 BILATERAL CHRONIC KNEE PAIN: ICD-10-CM

## 2025-02-17 PROCEDURE — 99214 OFFICE O/P EST MOD 30 MIN: CPT | Performed by: ORTHOPAEDIC SURGERY

## 2025-02-17 PROCEDURE — 73562 X-RAY EXAM OF KNEE 3: CPT

## 2025-02-17 NOTE — ASSESSMENT & PLAN NOTE
Orders:    XR knee 3 vw right non injury; Future    Ambulatory referral to Physical Therapy; Future

## 2025-02-17 NOTE — PROGRESS NOTES
Name: Cara Negrete      : 1985      MRN: [id number]  Encounter Provider: Abhilash White DO  Encounter Date: 2025   Encounter department: [facility]  :  Assessment & Plan  Patellofemoral arthritis    Orders:    XR knee 3 vw right non injury; Future    Ambulatory referral to Physical Therapy; Future    Bilateral chronic knee pain    Orders:    XR knee 3 vw left non injury; Future    Ambulatory referral to Physical Therapy; Future    Patella blaine    Orders:    Ambulatory referral to Physical Therapy; Future    Plan:  Cara Negrete is a 39 y.o. female with bilateral patellofemoral dysfunction, patella blaine  Diagnostics reviewed and physical exam performed.  Diagnosis, treatment options and associated risks were discussed with the patient including no treatment, nonsurgical treatment and potential for surgical intervention.  The patient was given the opportunity to ask questions regarding each.   Physical exam, diagnostic imaging, and subjective history are all most consistent with the above diagnosis. The pathoanatomy and natural history of this diagnosis was explained to the patient in detail.   X-ray obtained today and reviewed with the patient demonstrating patella blaine, mild osteoarthritis  Discussed with Laurie that this is amenable to non-operative treatment as outlined below  Order placed today for patient to initiate outpatient Physical Therapy  Recommend Billingsley taping to aid in improving patellofemoral tracking. Instructional/informational handout provided today. PT may aid in assisting with application.   Apply Voltaren gel to painful area up to 4 times a day  May use ice or heat as needed for pain relief  May take NSAIDs/Tylenol as needed for pain control   Follow up as needed    Subjective:    Patient ID:  Cara Negrete 39 y.o. female    History of Present Illness   HPI    Cara Negrete is a 39 y.o. female who presents today for  evaluation and treatment of bilateral knee pain. Right knee pain has been ongoing for many years and is associated with known patellofemoral dysfunction. She completed a 3 shot series of viscosupplementation of the right knee 2022 that provided her minimal symptomatic relief.  She notes new onset of medial and patellofemoral based left knee pain that began in January.  She has been taking Aleve with mild relief.  She notes clicking and catching in her bilateral knees, left greater than right.  She notes very infrequent sensation of instability.        Lab Results   Component Value Date    HGBA1C 5.4 05/18/2024       The following portions of the patient's history were reviewed and updated as appropriate: allergies, current medications, past family history, past social history, past surgical history and problem list.      Social History     Socioeconomic History    Marital status: /Civil Union     Spouse name: Not on file    Number of children: 1    Years of education: Not on file    Highest education level: Not on file   Occupational History    Not on file   Tobacco Use    Smoking status: Never    Smokeless tobacco: Never   Vaping Use    Vaping status: Never Used   Substance and Sexual Activity    Alcohol use: Not Currently     Comment: social    Drug use: No    Sexual activity: Yes     Partners: Male     Birth control/protection: OCP, Surgical   Other Topics Concern    Not on file   Social History Narrative    Not on file     Social Drivers of Health     Financial Resource Strain: Not on file   Food Insecurity: Not on file   Transportation Needs: Not on file   Physical Activity: Not on file   Stress: Not on file   Social Connections: Not on file   Intimate Partner Violence: Not on file   Housing Stability: Not on file     Past Medical History:   Diagnosis Date    ADHD (attention deficit hyperactivity disorder)     Anesthesia complication     needed a nebulizer Tx after cholecystectomy-only happened with this  "surgery    Asthma     sports induced    Black stools     with abdominal pain    Breathing difficulty     Chiari malformation type II (HCC)     Clostridium difficile infection     CTS (carpal tunnel syndrome)     Eosinophilic esophagitis     GERD (gastroesophageal reflux disease)     Migraine     Papanicolaou smear for cervical cancer screening 2018    neg    Varicella     childhood     Past Surgical History:   Procedure Laterality Date    ACHILLES TENDON LENGTHENING Right      SECTION      x2    CHOLECYSTECTOMY      OR  DELIVERY ONLY N/A 2016    Procedure:  SECTION ();  Surgeon: Forrest Jhaveri MD;  [facility]: [facility];  Service: Obstetrics    OR  DELIVERY ONLY N/A 2021    Procedure:  SECTION () REPEAT;  Surgeon: Forrest Jhaveri MD;  Location: [facility];  Service: Obstetrics    OR LAPAROSCOPY SURG CHOLECYSTECTOMY N/A 2019    Procedure: CHOLECYSTECTOMY LAPAROSCOPIC;  Surgeon: Jose R Batres DO;  Location[facility];  Service: General    SALPINGECTOMY Bilateral     TUBAL LIGATION Bilateral 2021    Procedure: LIGATION/COAGULATION TUBAL;  Surgeon: Forrest Jhaveri MD;  [facility]: [facility];  Service: Obstetrics    WISDOM TOOTH EXTRACTION       No Known Allergies  Current Outpatient Medications on File Prior to Visit   Medication Sig Dispense Refill    albuterol (PROVENTIL HFA,VENTOLIN HFA) 90 mcg/act inhaler Inhale 2 puffs every 6 (six) hours as needed for wheezing ProAir      FLUoxetine (PROzac) 10 mg capsule Take 1 capsule (10 mg total) by mouth daily for 14 days, THEN 2 capsules (20 mg total) daily for 16 days. 46 capsule 0    tiZANidine (ZANAFLEX) 2 mg tablet Take 1 tablet (2 mg total) by mouth daily at bedtime as needed for muscle spasms 30 tablet 1     No current facility-administered medications on file prior to visit.            Objective:  Objective   Ht 5' 7\" (1.702 m)   Wt 110 kg (243 lb)   BMI 38.06 kg/m²        Review of Systems  Pertinent " "items are noted in HPI.  All other systems were reviewed and are negative.    Physical Exam  Cons: Appears well.  No apparent distress.  Psych: Alert. Oriented x3.  Mood and affect normal.  Eyes: PERRLA, EOMI  Resp: Normal effort.  No audible wheezing or stridor.  CV: Palpable pulse.  No discernable arrhythmia.  No LE edema.  Lymph:  No palpable cervical, axillary, or inguinal lymphadenopathy.  Skin: Warm.  No palpable masses.  No visible lesions.  Neuro: Normal muscle tone.  Normal and symmetric DTR's.    BMI:   Estimated body mass index is 38.06 kg/m² as calculated from the following:    Height as of this encounter: 5' 7\" (1.702 m).    Weight as of this encounter: 110 kg (243 lb).    Right Knee Exam     Muscle Strength   The patient has normal right knee strength.    Range of Motion   The patient has normal right knee ROM.    Tests   Carrie:  Medial - negative Lateral - negative  Varus: negative Valgus: negative  Patellar apprehension: negative    Other   Erythema: absent  Scars: absent  Sensation: normal  Pulse: present  Swelling: none  Effusion: no effusion present    Comments:    + TTP lateral patellar facet, MPFL  Knee stable at 0, 30, 90 degrees  + Patellar grind  + peripatellar crepitation  Skin intact and well perfused  Sensation intact in DP/SP/[place]/Sa/T nerve distributions  2+ DP & PT pulses  Brisk capillary refill in all toes        Left Knee Exam     Muscle Strength   The patient has normal left knee strength.    Range of Motion   The patient has normal left knee ROM.    Tests   Carrie:  Medial - negative Lateral - negative  Varus: negative Valgus: negative  Patellar apprehension: negative    Other   Erythema: absent  Scars: absent  Sensation: normal  Pulse: present  Swelling: none  Effusion: no effusion present    Comments:    - TTP lateral patellar facet, MPFL  Knee stable at 0, 30, 90 degrees  - Patellar grind  + peripatellar crepitation  Skin intact and well perfused  Sensation intact in " "DP/SP/[place]/Sa/T nerve distributions  2+ DP & PT pulses  Brisk capillary refill in all toes              Procedures  Procedures  No Procedures performed today    Diagnostics, reviewed and taken today if performed as documented:  I have personally reviewed pertinent films in PACS and my interpretation is bilateral knee x-ray obtained today demonstrating patella blaine, mild osteoarthritis with maintained joint space.        Scribe Attestation      I,:  Kira Carnes am acting as a scribe while in the presence of the attending physician.:       I,:  Abhilash White DO personally performed the services described in this documentation    as scribed in my presence.:             Portions of the record may have been created with voice recognition software.  Occasional wrong word or \"sound a like\" substitutions may have occurred due to the inherent limitations of voice recognition software.  Read the chart carefully and recognize, using context, where substitutions have occurred.  "

## 2025-02-17 NOTE — PATIENT INSTRUCTIONS
PATELLOFEMORAL SYNDROME-Sultana TAPING TECHNIQUE    Search “Leukotape P tape” and “Cover roll stretch tape” on  [employer]  Leukotape P is typically 1.5in x 15 yards, Cover roll stretch tape is typically 2in x 10 yards        How to apply:  Place cover roll tape over knee cap. This protects the skin.  Apply Leukotape over the cover roll tape. Use the Leukotape to pull the knee cap to the middle of the body (medial side of knee) to prevent lateral (outside) tracking of the knee cap.  Wear the tape for 3 days (72 hrs) straight, then take off one day (24 hrs) off, then repeat for a total of 6 weeks. You should be > or = 50% improved at which time you may start weaning the tape by wearing it less each day.  Visit [employer] and search “Sultana tape for the knee” to watch a video on how to apply tape.  Video titled “Sultana Taping of the knee” created by [employer] recommended.    What does Sultana taping technique do?  Patellofemoral syndrome is when the inside quadriceps muscle, called the VMO muscle, becomes weak due to a number of factors. This causes the Patellofemoral ligament, which is the only ligament holding the patella (knee cap) in place, to become weak as well. When the ligament becomes weak, the knee cap drifts or tracks too far to the lateral side of the knee (outside knee) which causes tension on this ligament. The knee cap hits the lateral area of the femur, resulting in pain or discomfort around the front of the knee.  Physical Therapy is sometimes used to strengthen the weak muscles, such as the VMO, to correct this problem. When the tape is applied correctly, it helps to realign the knee cap to the center of the knee. This helps correct for the lateral tracking of the knee cap and relieve discomfort.  You can search online for exercises that can help strengthen the VMO quadriceps muscle or attend physical therapy.

## 2025-02-17 NOTE — ASSESSMENT & PLAN NOTE
Orders:    XR knee 3 vw left non injury; Future    Ambulatory referral to Physical Therapy; Future

## 2025-03-17 ENCOUNTER — EVALUATION (OUTPATIENT)
Dept: PHYSICAL THERAPY | Facility: MEDICAL CENTER | Age: 40
End: 2025-03-17
Payer: COMMERCIAL

## 2025-03-17 DIAGNOSIS — M25.561 BILATERAL CHRONIC KNEE PAIN: ICD-10-CM

## 2025-03-17 DIAGNOSIS — G89.29 BILATERAL CHRONIC KNEE PAIN: ICD-10-CM

## 2025-03-17 DIAGNOSIS — Q68.2 PATELLA ALTA: ICD-10-CM

## 2025-03-17 DIAGNOSIS — M25.562 BILATERAL CHRONIC KNEE PAIN: ICD-10-CM

## 2025-03-17 DIAGNOSIS — M17.10 PATELLOFEMORAL ARTHRITIS: ICD-10-CM

## 2025-03-17 PROCEDURE — 97161 PT EVAL LOW COMPLEX 20 MIN: CPT | Performed by: PHYSICAL THERAPIST

## 2025-03-17 PROCEDURE — 97110 THERAPEUTIC EXERCISES: CPT | Performed by: PHYSICAL THERAPIST

## 2025-03-17 NOTE — PROGRESS NOTES
PT Evaluation   POC: 3/17 -   Re-eval:     Today's date: 3/17/2025  Patient name: Cara Negrete  : 1985  MRN: [id number]  Referring provider: Abhilash White DO  Dx:   Encounter Diagnosis     ICD-10-CM    1. Patellofemoral arthritis  M17.10 Ambulatory referral to Physical Therapy      2. Bilateral chronic knee pain  M25.561 Ambulatory referral to Physical Therapy    M25.562     G89.29       3. Patella blaine  Q68.2 Ambulatory referral to Physical Therapy        Past Medical History:   Diagnosis Date    ADHD (attention deficit hyperactivity disorder)     Anesthesia complication     needed a nebulizer Tx after cholecystectomy-only happened with this surgery    Asthma     sports induced    Black stools     with abdominal pain    Breathing difficulty     Chiari malformation type II (HCC)     Clostridium difficile infection     CTS (carpal tunnel syndrome)     Eosinophilic esophagitis     GERD (gastroesophageal reflux disease)     Migraine     Papanicolaou smear for cervical cancer screening 2018    neg    Varicella     childhood     1 2 3 4 5 6   3/17 IE        7 8 9 10 11 12           13 14 15 16 17 18           19 20 21 22 23 24           25 26 27 28 29 30               Start Time: 1743  Stop Time: 1830  Total time in clinic (min): 47 minutes    Assessment  Impairments: abnormal coordination, abnormal gait, abnormal muscle firing, abnormal muscle tone, abnormal or restricted ROM, abnormal movement, activity intolerance, impaired physical strength, lacks appropriate home exercise program, pain with function, weight-bearing intolerance, poor body mechanics, unable to perform ADL, participation limitations, activity limitations and endurance  Symptom irritability: moderate    Assessment details: Cara Negrete is a 39 y.o. y/o female who presents to the clinic with a referral diagnosis of bilateral knee pain, patella-femoral arthritis, and patella blaine.  Their rehab potential is good. Key  impairments include pain in end range, decreased knee motor control, and tibiofemoral joint hypermobility. These impairments affect the patients ability to get up off the floor, sleep at night, squat down, and walk for extended periods of time . Test and measures revealed an elevated and slightly laterally displaced right patella at rest, a decreased quad contraction on the right side, and a positive grind test. The patients symptoms and test results are consistent with patellofemoral pain syndrome secondary to a superior and laterally displaced patella. The primary goal of the patient is to be able to function without pain in her daily life. This patient would benefit from skilled PT to address their impairments and reach their goals. The patient was educated on their condition and on their HEP, expressing understanding of the topics. Thank you for the evaluation and please do not hesitate to reach out with any questions, comments, or concerns.   Understanding of Dx/Px/POC: good     Prognosis: good    Goals  STG: Performed by weeks 2-4  1. Pt will demonstrate a strong quad contraction by week 3 as shown by 5/5 on MMT  2. Pt will report 25% less pain when squatting down/ getting off the floor by week 4  3. Pt will report no knee locking for 2 weeks by week 4    LTG: performed by weeks 8-discharge  1. Pt will have 0/10 pain when getting off of the floor by discharge  2. Pt will have no symptom recreation throughout rang of motion by discharge  3.Patient will be independent in ADL's, HEP, and be able to self manage symptoms by discharge  4.Patient will reach predicted FOTO score by discharge      Plan  Patient would benefit from: skilled physical therapy  Planned modality interventions: biofeedback, electrical stimulation/Russian stimulation, TENS, thermotherapy: hydrocollator packs and cryotherapy    Planned therapy interventions: IASTM, joint mobilization, kinesiology taping, manual therapy, massage, Billingsley  taping, motor coordination training, neuromuscular re-education, patient/caregiver education, postural training, strengthening, stretching, therapeutic activities, therapeutic exercise, therapeutic training, home exercise program, graded activity, gait training, graded exercise, functional ROM exercises, flexibility, coordination, body mechanics training, ADL retraining and activity modification    Frequency: 1-2x week  Duration in weeks: 12  Plan of Care beginning date: 3/17/2025  Plan of Care expiration date: 2025  Treatment plan discussed with: patient        Subjective Evaluation    History of Present Illness  Mechanism of injury: Laurie comes into the clinic today reporting:  -B/L knee pain, knee cap is shifted on right knee  -Sharp pain in middle/back of her left knee  - Right leg has taken a lot of impact from horseback riding  -Right achilles lengthening in the past around   -Ever since she banging it 7 years ago on concrete (has gotten worse in the last year)  -Began locking real briefly when walking up and down stairs  -In January she felt a sharp shooting pain up the back of her left knee  -Got injections a year or two ago in the R knee  - Tried PT in the past and has some success but life was also busy and did not go through it for long  -Woke her up 2-3 times in the past year but is worse at night  -Has a history of joint hypermobile disorder  Quality of life: good    Patient Goals  Patient goal: Lessen the pain, more pain free mobility  Pain  Current pain ratin  At best pain ratin  At worst pain ratin (At night)  Location: Right Medial knee cap can move around  Quality: sharp and throbbing  Relieving factors: medications, ice and heat  Aggravating factors: walking (End of the day. squatting down, getting up off the floor)  Progression: worsening      Diagnostic Tests  X-ray: abnormal (patellar blaine)  Treatments  Previous treatment: physical therapy, injection treatment and  medication        Objective     Tenderness     Right Knee   Tenderness in the inferior patella, lateral patella, medial patella and superior patella.     Active Range of Motion     Right Knee   Flexion: 120 degrees   Extensor la degrees     Passive Range of Motion     Right Knee   Flexion: 135 degrees   Extension: 0 degrees     Mobility   Patellar Mobility:     Right Knee   Hypermobile: medial and lateral     Additional Mobility Details  Increased pain when held laterally during movement and decreased pain when held medially or inferiorly during movement    Patellar Static Positioning   Right Knee: blaine    Joint Play     Comments  Left proximal tibiofibular joint comments: Demonstrated knee recurvatum.     Strength/Myotome Testing     Left Knee   Flexion: 5  Extension: 5  Quadriceps contraction: good    Right Knee   Flexion: 4+  Extension: 4+  Quadriceps contraction: fair    Tests     Right Knee   Positive patella-femoral grind.   Negative patellar apprehension, patellar compression, valgus stress test at 0 degrees, valgus stress test at 30 degrees, varus stress test at 0 degrees and varus stress test at 30 degrees.              Precautions: Standard Precautions, Hypermobile joints    Home Exercise Program  Access Code: [email or web address]  URL: [email or web address]  Date: 2025  Prepared by: Alex Guzman    Exercises  - Supine Quad Set  - 3 x daily - 7 x weekly - 3 sets - 10 reps  - Active Straight Leg Raise with Quad Set  - 3 x daily - 7 x weekly - 3 sets - 10 reps  - Prone Quadriceps Stretch with Strap  - 3 x daily - 7 x weekly - 3 sets - 30 hold  - Forward Step Over with Counter Support  - 1 x daily - 7 x weekly - 3 sets - 10 reps    Patient seen 1 on 1 throughout entire session  Manuals 3/17            Inf Patellar shira Billingsley Taping                                       Neuro Re-Ed             SAQ             Over and Back step for ladonna             LAQ             Mini squats              Single leg heel taps off step                                       Ther Ex             HEP EDU 10 min            Leg press             Prone quad stretch                                                                              Ther Activity                                       Gait Training                                       Modalities

## 2025-03-24 ENCOUNTER — OFFICE VISIT (OUTPATIENT)
Dept: PHYSICAL THERAPY | Facility: MEDICAL CENTER | Age: 40
End: 2025-03-24
Payer: COMMERCIAL

## 2025-03-24 DIAGNOSIS — M17.10 PATELLOFEMORAL ARTHRITIS: Primary | ICD-10-CM

## 2025-03-24 DIAGNOSIS — M25.561 BILATERAL CHRONIC KNEE PAIN: ICD-10-CM

## 2025-03-24 DIAGNOSIS — G89.29 BILATERAL CHRONIC KNEE PAIN: ICD-10-CM

## 2025-03-24 DIAGNOSIS — M25.562 BILATERAL CHRONIC KNEE PAIN: ICD-10-CM

## 2025-03-24 DIAGNOSIS — Q68.2 PATELLA ALTA: ICD-10-CM

## 2025-03-24 PROCEDURE — 97140 MANUAL THERAPY 1/> REGIONS: CPT | Performed by: PHYSICAL THERAPIST

## 2025-03-24 PROCEDURE — 97112 NEUROMUSCULAR REEDUCATION: CPT | Performed by: PHYSICAL THERAPIST

## 2025-03-24 NOTE — PROGRESS NOTES
"Daily Note   POC: 3/17 -   Re-eval:     Today's date: 3/24/2025  Patient name: Cara Negrete  : 1985  MRN: [id number]  Referring provider: Abhilash White DO  Dx:   Encounter Diagnosis     ICD-10-CM    1. Patellofemoral arthritis  M17.10       2. Bilateral chronic knee pain  M25.561     M25.562     G89.29       3. Patella blaine  Q68.2         Past Medical History:   Diagnosis Date    ADHD (attention deficit hyperactivity disorder)     Anesthesia complication     needed a nebulizer Tx after cholecystectomy-only happened with this surgery    Asthma     sports induced    Black stools     with abdominal pain    Breathing difficulty     Chiari malformation type II (HCC)     Clostridium difficile infection     CTS (carpal tunnel syndrome)     Eosinophilic esophagitis     GERD (gastroesophageal reflux disease)     Migraine     Papanicolaou smear for cervical cancer screening 2018    neg    Varicella     childhood       1 2 3 4 5 6   3/17 IE 3/24       7 8 9 10 11 12           13 14 15 16 17 18           19 20 21 22 23 24           25 26 27 28 29 30             Start Time:   Stop Time:   Total time in clinic (min): 45 minutes    Subjective: \"My pain was worse yesterday and today but the exercises have been going well.\"      Objective: See treatment diary below      Assessment: The patient tolerated the session with minimal knee discomfort. The patient demonstrated a good quad contraction and demonstrated control of the muscle without hyperextending the knee on the leg press today . The patient found the treatment today not difficult but focused on proper mechanics. She will continue to benefit from dynamic quad contraction, strengthening, and addressing muscle imbalances  to address her chronic knee pain.Billingsley taping was used on her knee to promote lateral and inferior glides to the patella. She was instructed to wear it for a day to assess its benefits . She will continue to benefit from " skilled PT to address their remaining impairments, reach their goals, and maximize their rehabilitation potential.         Plan: Continue per plan of care.  Progress treatment as tolerated.    Goals  STG: Performed by weeks 2-4  1. Pt will demonstrate a strong quad contraction by week 3 as shown by 5/5 on MMT  2. Pt will report 25% less pain when squatting down/ getting off the floor by week 4  3. Pt will report no knee locking for 2 weeks by week 4    LTG: performed by weeks 8-discharge  1. Pt will have 0/10 pain when getting off of the floor by discharge  2. Pt will have no symptom recreation throughout rang of motion by discharge  3.Patient will be independent in ADL's, HEP, and be able to self manage symptoms by discharge  4.Patient will reach predicted FOTO score by discharge    Frequency: 1-2x week  Duration in weeks: 12  Plan of Care beginning date: 3/17/2025  Plan of Care expiration date: 6/9/2025  Treatment plan discussed with: patient     Precautions: Standard Precautions, Hypermobile joints    Home Exercise Program  Access Code: [email or web address]  URL: [email or web address]  Date: 03/17/2025  Prepared by: Alex Guzman    Exercises  - Supine Quad Set  - 3 x daily - 7 x weekly - 3 sets - 10 reps  - Active Straight Leg Raise with Quad Set  - 3 x daily - 7 x weekly - 3 sets - 10 reps  - Prone Quadriceps Stretch with Strap  - 3 x daily - 7 x weekly - 3 sets - 30 hold  - Forward Step Over with Counter Support  - 1 x daily - 7 x weekly - 3 sets - 10 reps    Patient seen 1:1 from [id number]  Manuals 3/17 3/24           Inf Patellar glides  CB           Jose Cruz Taping  CB                                     Neuro Re-Ed             SAQ with SLR  2x 10x           Over and Back step for ladonna  25x each           LAQ  20x es leg           Mini squats             Single leg heel taps off step                                       Ther Ex             HEP EDU 10 min            Leg press  15x 32#    10x 75# w  GTB around knees           Prone quad stretch  2x 30           Ascending/descending stairs  3x                                                               Ther Activity                                       Gait Training                                       Modalities

## 2025-04-07 ENCOUNTER — APPOINTMENT (OUTPATIENT)
Dept: PHYSICAL THERAPY | Facility: MEDICAL CENTER | Age: 40
End: 2025-04-07
Payer: COMMERCIAL

## 2025-04-10 ENCOUNTER — APPOINTMENT (OUTPATIENT)
Dept: PHYSICAL THERAPY | Facility: MEDICAL CENTER | Age: 40
End: 2025-04-10
Payer: COMMERCIAL

## 2025-04-14 ENCOUNTER — APPOINTMENT (OUTPATIENT)
Dept: PHYSICAL THERAPY | Facility: MEDICAL CENTER | Age: 40
End: 2025-04-14
Payer: COMMERCIAL

## 2025-04-15 ENCOUNTER — OFFICE VISIT (OUTPATIENT)
Dept: PHYSICAL THERAPY | Facility: MEDICAL CENTER | Age: 40
End: 2025-04-15
Payer: COMMERCIAL

## 2025-04-15 DIAGNOSIS — M25.561 BILATERAL CHRONIC KNEE PAIN: ICD-10-CM

## 2025-04-15 DIAGNOSIS — M17.10 PATELLOFEMORAL ARTHRITIS: Primary | ICD-10-CM

## 2025-04-15 DIAGNOSIS — M25.562 BILATERAL CHRONIC KNEE PAIN: ICD-10-CM

## 2025-04-15 DIAGNOSIS — G89.29 BILATERAL CHRONIC KNEE PAIN: ICD-10-CM

## 2025-04-15 DIAGNOSIS — Q68.2 PATELLA ALTA: ICD-10-CM

## 2025-04-15 PROCEDURE — 97112 NEUROMUSCULAR REEDUCATION: CPT | Performed by: PHYSICAL THERAPIST

## 2025-04-15 PROCEDURE — 97110 THERAPEUTIC EXERCISES: CPT | Performed by: PHYSICAL THERAPIST

## 2025-04-15 NOTE — PROGRESS NOTES
Daily Note   POC: 3/17 -   Re-eval: in 4 visits    Today's date: 4/15/2025  Patient name: Cara Negrete  : 1985  MRN: [id number]  Referring provider: Abhilash White DO  Dx:   Encounter Diagnosis     ICD-10-CM    1. Patellofemoral arthritis  M17.10       2. Bilateral chronic knee pain  M25.561     M25.562     G89.29       3. Patella blaine  Q68.2           Past Medical History:   Diagnosis Date    ADHD (attention deficit hyperactivity disorder)     Anesthesia complication     needed a nebulizer Tx after cholecystectomy-only happened with this surgery    Asthma     sports induced    Black stools     with abdominal pain    Breathing difficulty     Chiari malformation type II (HCC)     Clostridium difficile infection     CTS (carpal tunnel syndrome)     Eosinophilic esophagitis     GERD (gastroesophageal reflux disease)     Migraine     Papanicolaou smear for cervical cancer screening 2018    neg    Varicella     childhood       1 2 3 4 5 6   3/17 IE 3/24 4/15      7 8 9 10 11 12           13 14 15 16 17 18           19 20 21 22 23 24           25 26 27 28 29 30             Start Time: 1700  Stop Time: 1751  Total time in clinic (min): 51 minutes    Subjective: Pt reports taking her dog on a walk and she tripped on her driveway. Her knees took the impact of her fall.      Objective: See treatment diary below      Assessment: The patient tolerated the session with minimal knee discomfort. The patient demonstrates a slight trendelenburg gait when walking and her hip glut medius weakness was noted during the hip hike exercise. She requires verbal cues to prevent knee valgus and was only able to perfomed the eccentric step downs properly with two hand support on railings. Her knee strength remains strong but proper activation and not working into full end range extension will be maintained. The patient found the treatment today not difficult but focused on proper mechanics. She will continue to benefit  from dynamic quad contraction, strengthening, and addressing muscle imbalances  to address her chronic knee pain. Billingsley taping was used on her knee to promote lateral and inferior glides to the patella. She was instructed to wear it for a day to assess its benefits . She will continue to benefit from skilled PT to address their remaining impairments, reach their goals, and maximize their rehabilitation potential.         Plan: Continue per plan of care.  Progress treatment as tolerated.    Goals  STG: Performed by weeks 2-4  1. Pt will demonstrate a strong quad contraction by week 3 as shown by 5/5 on MMT  2. Pt will report 25% less pain when squatting down/ getting off the floor by week 4  3. Pt will report no knee locking for 2 weeks by week 4    LTG: performed by weeks 8-discharge  1. Pt will have 0/10 pain when getting off of the floor by discharge  2. Pt will have no symptom recreation throughout rang of motion by discharge  3.Patient will be independent in ADL's, HEP, and be able to self manage symptoms by discharge  4.Patient will reach predicted FOTO score by discharge    Frequency: 1-2x week  Duration in weeks: 12  Plan of Care beginning date: 3/17/2025  Plan of Care expiration date: 6/9/2025  Treatment plan discussed with: patient     Precautions: Standard Precautions, Hypermobile joints    Home Exercise Program  Access Code: [email or web address]  URL: [email or web address]  Date: 03/17/2025  Prepared by: Alex Guzman    Exercises  - Supine Quad Set  - 3 x daily - 7 x weekly - 3 sets - 10 reps  - Active Straight Leg Raise with Quad Set  - 3 x daily - 7 x weekly - 3 sets - 10 reps  - Prone Quadriceps Stretch with Strap  - 3 x daily - 7 x weekly - 3 sets - 30 hold  - Forward Step Over with Counter Support  - 1 x daily - 7 x weekly - 3 sets - 10 reps    Patient seen 1:1 the whole session  Manuals 3/17 3/24 4/15          Inf Patellar glides  CHARLIE Gandara CB, CB                                     Neuro Re-Ed             SAQ with SLR  2x 10 2x10          Over and Back step for ladonna  25x each 25x ea          LAQ  20x ea leg 30x ea 5#          Mini squats   5x          Single leg heel taps off step   15x ea leg          Hip hike   30x ea                       Ther Ex             HEP EDU 10 min            Leg press  15x 32#    10x 75# w GTB around knees 10x 75#     10x 105#    10x  125#    10x 145#              Prone quad stretch  2x 30           Ascending/descending stairs  3x           Hamstring curls in standing   2x 15 each RTB                                                 Ther Activity                                       Gait Training                                       Modalities                                             steady

## 2025-04-17 ENCOUNTER — APPOINTMENT (OUTPATIENT)
Dept: PHYSICAL THERAPY | Facility: MEDICAL CENTER | Age: 40
End: 2025-04-17
Payer: COMMERCIAL

## 2025-04-21 ENCOUNTER — APPOINTMENT (OUTPATIENT)
Dept: PHYSICAL THERAPY | Facility: MEDICAL CENTER | Age: 40
End: 2025-04-21
Payer: COMMERCIAL

## 2025-04-24 ENCOUNTER — APPOINTMENT (OUTPATIENT)
Dept: PHYSICAL THERAPY | Facility: MEDICAL CENTER | Age: 40
End: 2025-04-24
Payer: COMMERCIAL

## 2025-04-28 ENCOUNTER — APPOINTMENT (OUTPATIENT)
Dept: PHYSICAL THERAPY | Facility: MEDICAL CENTER | Age: 40
End: 2025-04-28
Payer: COMMERCIAL

## 2025-04-29 ENCOUNTER — OFFICE VISIT (OUTPATIENT)
Dept: PHYSICAL THERAPY | Facility: MEDICAL CENTER | Age: 40
End: 2025-04-29
Payer: COMMERCIAL

## 2025-04-29 DIAGNOSIS — Q68.2 PATELLA ALTA: ICD-10-CM

## 2025-04-29 DIAGNOSIS — G89.29 BILATERAL CHRONIC KNEE PAIN: ICD-10-CM

## 2025-04-29 DIAGNOSIS — M25.562 BILATERAL CHRONIC KNEE PAIN: ICD-10-CM

## 2025-04-29 DIAGNOSIS — M17.10 PATELLOFEMORAL ARTHRITIS: Primary | ICD-10-CM

## 2025-04-29 DIAGNOSIS — M25.561 BILATERAL CHRONIC KNEE PAIN: ICD-10-CM

## 2025-04-29 PROCEDURE — 97140 MANUAL THERAPY 1/> REGIONS: CPT | Performed by: PHYSICAL THERAPIST

## 2025-04-29 PROCEDURE — 97110 THERAPEUTIC EXERCISES: CPT | Performed by: PHYSICAL THERAPIST

## 2025-04-29 NOTE — PROGRESS NOTES
Daily Note   POC: 3/17 -   Re-eval: in 2 visits    Today's date: 2025  Patient name: Cara Negrete  : 1985  MRN: [id number]  Referring provider: Abhilash White DO  Dx:   Encounter Diagnosis     ICD-10-CM    1. Patellofemoral arthritis  M17.10       2. Bilateral chronic knee pain  M25.561     M25.562     G89.29       3. Patella blaine  Q68.2           Past Medical History:   Diagnosis Date    ADHD (attention deficit hyperactivity disorder)     Anesthesia complication     needed a nebulizer Tx after cholecystectomy-only happened with this surgery    Asthma     sports induced    Black stools     with abdominal pain    Breathing difficulty     Chiari malformation type II (HCC)     Clostridium difficile infection     CTS (carpal tunnel syndrome)     Eosinophilic esophagitis     GERD (gastroesophageal reflux disease)     Migraine     Papanicolaou smear for cervical cancer screening 2018    neg    Varicella     childhood       1 2 3 4 5 6   3/17 IE 3/24 4/15 4/29     7 8 9 10 11 12           13 14 15 16 17 18           19 20 21 22 23 24           25 26 27 28 29 30             Start Time: 1700  Stop Time: 1745  Total time in clinic (min): 45 minutes    Subjective: Pt reports the knee has been feeling much better       Objective: See treatment diary below      Assessment: The patient tolerated the session with minimal knee discomfort. The patient demonstrated knee valgus and a weight shift toward the right leg during squats. Part of this is due to different muscle lengths of her achilles but when given a resistance band around her knees she was able to correct this at her hip and knee joints. The patient was taught how to Billingsley tape her knee to promote a more consistent tracking pattern. The patient found the treatment today mildly challenging but focused on proper mechanics. She will continue to benefit from dynamic quad contraction, strengthening, and addressing muscle imbalances  to address her  chronic knee pain. Billingsley taping was used on her knee to promote lateral and inferior glides to the patella. She was instructed to wear it for a day to assess its benefits . She will continue to benefit from skilled PT to address their remaining impairments, reach their goals, and maximize their rehabilitation potential.         Plan: Continue per plan of care.  Progress treatment as tolerated.    Goals  STG: Performed by weeks 2-4  1. Pt will demonstrate a strong quad contraction by week 3 as shown by 5/5 on MMT  2. Pt will report 25% less pain when squatting down/ getting off the floor by week 4  3. Pt will report no knee locking for 2 weeks by week 4    LTG: performed by weeks 8-discharge  1. Pt will have 0/10 pain when getting off of the floor by discharge  2. Pt will have no symptom recreation throughout rang of motion by discharge  3.Patient will be independent in ADL's, HEP, and be able to self manage symptoms by discharge  4.Patient will reach predicted FOTO score by discharge    Frequency: 1-2x week  Duration in weeks: 12  Plan of Care beginning date: 3/17/2025  Plan of Care expiration date: 6/9/2025  Treatment plan discussed with: patient     Precautions: Standard Precautions, Hypermobile joints    Home Exercise Program  Access Code: [email or web address]  URL: [email or web address]  Date: 03/17/2025  Prepared by: Alex Guzman    Exercises  - Supine Quad Set  - 3 x daily - 7 x weekly - 3 sets - 10 reps  - Active Straight Leg Raise with Quad Set  - 3 x daily - 7 x weekly - 3 sets - 10 reps  - Prone Quadriceps Stretch with Strap  - 3 x daily - 7 x weekly - 3 sets - 30 hold  - Forward Step Over with Counter Support  - 1 x daily - 7 x weekly - 3 sets - 10 reps    Patient seen 1:1 the whole session  Manuals 3/17 3/24 4/15 4/29         Inf Patellar glides  CB           Jose Cruz Taping  CB CB CB                                   Neuro Re-Ed             SAQ with SLR  2x 10 2x10 3x10 5#         Over and  Back step for ladonna  25x each 25x ea 30x ea         LAQ  20x ea leg 30x ea 5# 30x ea 5#    10x ea slow eccentric         Mini squats   5x 4x10         Single leg heel taps off step   15x ea leg          Hip hike   30x ea          Bridges    3x10         Ther Ex             HEP EDU 10 min            Leg press  15x 32#    10x 75# w GTB around knees 10x 75#     10x 105#    10x  125#    10x 145#              Prone quad stretch  2x 30           Ascending/descending stairs  3x           Hamstring curls in standing   2x 15 each RTB 20x ea in sitting BTB                                                Ther Activity                                       Gait Training                                       Modalities

## 2025-04-30 DIAGNOSIS — F41.9 ANXIETY: ICD-10-CM

## 2025-04-30 NOTE — TELEPHONE ENCOUNTER
Medication: FLUoxetine (PROzac)     Dose/Frequency: requesting the 20 mg (said 20 mg works well)    Quantity: ?    Pharmacy: [facility] - [place] - [address] [phone]     Office:   [x] PCP/Provider - [place] Niño PA-C   [] Speciality/Provider -     Does the patient have enough for 3 days?   [] Yes   [x] No - Send as HP to POD

## 2025-05-01 ENCOUNTER — APPOINTMENT (OUTPATIENT)
Dept: PHYSICAL THERAPY | Facility: MEDICAL CENTER | Age: 40
End: 2025-05-01
Payer: COMMERCIAL

## 2025-05-05 ENCOUNTER — APPOINTMENT (OUTPATIENT)
Dept: PHYSICAL THERAPY | Facility: MEDICAL CENTER | Age: 40
End: 2025-05-05
Payer: COMMERCIAL

## 2025-05-06 ENCOUNTER — OFFICE VISIT (OUTPATIENT)
Dept: PHYSICAL THERAPY | Facility: MEDICAL CENTER | Age: 40
End: 2025-05-06
Payer: COMMERCIAL

## 2025-05-06 DIAGNOSIS — M25.562 BILATERAL CHRONIC KNEE PAIN: ICD-10-CM

## 2025-05-06 DIAGNOSIS — M17.10 PATELLOFEMORAL ARTHRITIS: Primary | ICD-10-CM

## 2025-05-06 DIAGNOSIS — G89.29 BILATERAL CHRONIC KNEE PAIN: ICD-10-CM

## 2025-05-06 DIAGNOSIS — M25.561 BILATERAL CHRONIC KNEE PAIN: ICD-10-CM

## 2025-05-06 DIAGNOSIS — Q68.2 PATELLA ALTA: ICD-10-CM

## 2025-05-06 PROCEDURE — 97530 THERAPEUTIC ACTIVITIES: CPT | Performed by: PHYSICAL THERAPIST

## 2025-05-06 PROCEDURE — 97112 NEUROMUSCULAR REEDUCATION: CPT | Performed by: PHYSICAL THERAPIST

## 2025-05-06 NOTE — PROGRESS NOTES
"Daily Note/ Re-evaluation  POC: 3/17 -   Re-eval: in 1 visits    Today's date: 2025  Patient name: Cara Negrete  : 1985  MRN: [id number]  Referring provider: Abhilash White DO  Dx:   Encounter Diagnosis     ICD-10-CM    1. Patellofemoral arthritis  M17.10       2. Bilateral chronic knee pain  M25.561     M25.562     G89.29       3. Patella blaine  Q68.2             Past Medical History:   Diagnosis Date    ADHD (attention deficit hyperactivity disorder)     Anesthesia complication     needed a nebulizer Tx after cholecystectomy-only happened with this surgery    Asthma     sports induced    Black stools     with abdominal pain    Breathing difficulty     Chiari malformation type II (HCC)     Clostridium difficile infection     CTS (carpal tunnel syndrome)     Eosinophilic esophagitis     GERD (gastroesophageal reflux disease)     Migraine     Papanicolaou smear for cervical cancer screening 2018    neg    Varicella     childhood       1 2 3 4 5 6   3/17 IE 3/24 4/15 4/29 5/6   RE    7 8 9 10 11 12           13 14 15 16 17 18           19 20 21 22 23 24           25 26 27 28 29 30             Start Time: 1700  Stop Time: 1745  Total time in clinic (min): 45 minutes    Subjective: Pt reports that she is improving getting up off the floor, carrying her kid, and it is getting stronger.       Objective: See treatment diary below    Patient Goals  Patient goal: Lessen the pain, more pain free mobility    Pain  Current pain ratin   At best pain ratin  At worst pain ratin (pain does not wake her up anymore)  Location: Right Medial knee cap can move around  Quality: sharp and throbbing  Relieving factors: medications, ice and heat  Aggravating factors: walking (End of the day, squatting and bending down not as bad.\"  Progression: worsening        Objective     Tenderness     Right Knee   Tenderness in the inferior patella, lateral patella, medial patella and superior patella.     Active " Range of Motion     Right Knee   Flexion: 120 degrees   Extensor la degrees     Passive Range of Motion     Right Knee   Flexion: 135 degrees   Extension: 0 degrees     Mobility   Patellar Mobility:     Right Knee   Hypermobile: medial and lateral ( not has hypermobile to PROM from IE)    Additional Mobility Details  No pain with patellar movement at this time    Patellar Static Positioning   Right Knee: blaine    Joint Play     Comments  Left proximal tibiofibular joint comments: Demonstrated knee recurvatum.     Strength/Myotome Testing     Left Knee   Flexion: 5  Extension: 5  Quadriceps contraction: good    Right Knee   Flexion: 5  Extension: 5  Quadriceps contraction: fair  Hip flex: 5/5  Hip ext: 5/5  Hip abd: 5/5    Tests     Right Knee   Negative patella-femoral grind.   Negative patellar apprehension, patellar compression, valgus stress test at 0 degrees, valgus stress test at 30 degrees, varus stress test at 0 degrees and varus stress test at 30 degrees.     Assessment: Cara Negrete is a 39 y.o. female [facility]. This is the patient's 5th session for their patella-femoral arthritis . Pt states they have made good progress t/o the current PT plan of care thus far. Pt states their current limitations include occasional sharp pain and a baseline level of pain at her medial patella and joint line that in inconsistent in its intensity. Pt continues to note increased pain/difficulty with end range motions and with pain at night. She expressed improvement in her pain at night and in her ability to get onto/off of the ground as well as carrying her child Pt states they are 80% to their desired level with PT services thus far. ROM assessment showed no changes in motion degree but more pain free motion . Strength assessment showed full 5/5 hip and knee strength, now the focus remains on motor control. She also was no longer positive for the patello-femoral grind test which she  previously was. Upon completion of the FOTO, the score showed a subjective increase in overall function receiving a score of 64 compared to the initial evaluation score of 55. Pt has reached their goals of increasing strength, reducing pain, and having no knee locking but continue to work on eliminating pain and improving her knee and hip biomechanics during functional motions . They have been educated on their current condition and their prognosis. They will continue to benefit from skilled PT services to address these impairments, improve ADL performance, improve activity tolerance, and improve quality of life.          Plan: Continue per plan of care.  Progress treatment as tolerated.    Goals  STG: Performed by weeks 2-4  1. Pt will demonstrate a strong quad contraction by week 3 as shown by 5/5 on MMT (MET)  2. Pt will report 25% less pain when squatting down/ getting off the floor by week 4 (MET)  3. Pt will report no knee locking for 2 weeks by week 4 (MET)    LTG: performed by weeks 8-discharge  1. Pt will have 0/10 pain when getting off of the floor by discharge (Progressing)  2. Pt will have no symptom recreation throughout range of motion by discharge (progressing)  3.Patient will be independent in ADL's, HEP, and be able to self manage symptoms by discharge  4.Patient will reach predicted FOTO score by discharge    Frequency: 1-2x week  Duration in weeks: 12  Plan of Care beginning date: 3/17/2025  Plan of Care expiration date: 6/9/2025  Treatment plan discussed with: patient     Precautions: Standard Precautions, Hypermobile joints    Home Exercise Program  Access Code: [email or web address]  URL: [email or web address]  Date: 03/17/2025  Prepared by: Alex Guzman    Exercises  - Supine Quad Set  - 3 x daily - 7 x weekly - 3 sets - 10 reps  - Active Straight Leg Raise with Quad Set  - 3 x daily - 7 x weekly - 3 sets - 10 reps  - Prone Quadriceps Stretch with Strap  - 3 x daily - 7 x weekly - 3 sets -  30 hold  - Forward Step Over with Counter Support  - 1 x daily - 7 x weekly - 3 sets - 10 reps    Patient seen 1:1 the whole session  Manuals 3/17 3/24 4/15 4/29 5/6        Inf Patellar glides  CB           Billingsley Taping  CB CB CB CB                                  Neuro Re-Ed             SAQ with SLR  2x 10 2x10 3x10 5#         Over and Back step for ladonna  25x each 25x ea 30x ea         LAQ  20x ea leg 30x ea 5# 30x ea 5#    10x ea slow eccentric 30x ea 7#            Mini squats   5x 4x10 2x 20 with GTB        Single leg heel taps off step   15x ea leg  15x ea leg        Hip hike   30x ea          Bridges    3x10 30x        Ther Ex             HEP EDU 10 min            Leg press  15x 32#    10x 75# w GTB around knees 10x 75#     10x 105#    10x  125#    10x 145#              Prone quad stretch  2x 30           Ascending/descending stairs  3x           Hamstring curls in standing   2x 15 each RTB 20x ea in sitting BTB                                                Ther Activity             Re-eval     20 min                     Gait Training                                       Modalities

## 2025-05-08 ENCOUNTER — APPOINTMENT (OUTPATIENT)
Dept: PHYSICAL THERAPY | Facility: MEDICAL CENTER | Age: 40
End: 2025-05-08
Payer: COMMERCIAL

## 2025-05-12 ENCOUNTER — OFFICE VISIT (OUTPATIENT)
Dept: INTERNAL MEDICINE CLINIC | Age: 40
End: 2025-05-12
Payer: COMMERCIAL

## 2025-05-12 VITALS
HEART RATE: 78 BPM | SYSTOLIC BLOOD PRESSURE: 122 MMHG | TEMPERATURE: 98.1 F | BODY MASS INDEX: 37.67 KG/M2 | OXYGEN SATURATION: 98 % | HEIGHT: 67 IN | WEIGHT: 240 LBS | DIASTOLIC BLOOD PRESSURE: 74 MMHG

## 2025-05-12 DIAGNOSIS — J02.9 SORE THROAT: ICD-10-CM

## 2025-05-12 DIAGNOSIS — J06.9 URTI (ACUTE UPPER RESPIRATORY INFECTION): Primary | ICD-10-CM

## 2025-05-12 LAB — S PYO AG THROAT QL: NEGATIVE

## 2025-05-12 PROCEDURE — 99213 OFFICE O/P EST LOW 20 MIN: CPT | Performed by: INTERNAL MEDICINE

## 2025-05-12 PROCEDURE — 87880 STREP A ASSAY W/OPTIC: CPT | Performed by: INTERNAL MEDICINE

## 2025-05-12 NOTE — PROGRESS NOTES
Name: Cara Negrete      : 1985      MRN: [id number]  Encounter Provider: Kelly Herndon DO  Encounter Date: 2025   Encounter department: [facility]  :  Assessment & Plan  URTI (acute upper respiratory infection)  - Point-of-care rapid strep test was negative  - Likely viral with bacterial superinfection vs bacterial  - we will start pt on Augmentin 875-125 mg twice daily for 7 days, and she may use over-the-counter Flonase nasal spray for rhinitis and Robitussin for dry cough  - Pt was counseled to use OTC tylenol or ibuprofen with meals for aches and pains, warm salt water gargles for sore throat and was encouraged to drink lots of fluids, get plenty of rest and wash her hands liberally.  - pt was also counseled to take antibiotics with food and also to use a probiotic like yogurt daily as long as she is taking antibiotics  - She should return to the clinic if her symptoms worsen or do not improve.    Orders:    amoxicillin-clavulanate (AUGMENTIN) 875-125 mg per tablet; Take 1 tablet by mouth every 12 (twelve) hours for 7 days    Sore throat    Orders:    POCT rapid ANTIGEN strepA           History of Present Illness         Patient presents with complaints of sore throat for the past 3 days with swollen lymph nodes  She admits to right-sided earache, postnasal drip, and a mild dry cough.  She denies any  fever, chills, night sweats, headache, dizziness, nasal congestion, runny nose,  sinus pain or pressure, wheezing,   chest pain, sob, palpitations, nausea, vomiting, diarrhea, constipation, hematochezia, hematuria, melena stools, arthralgias, myalgias  No hx of contact   Works from home  Took a new wt loss med recently prior to onset of her symptoms- 3 days ago   LMP- 2 weeks ago s/p btl      Sore Throat   Associated symptoms include coughing (mild and dry) and ear pain (R sided earache). Pertinent negatives include no abdominal pain, diarrhea, headaches,  "shortness of breath or vomiting.         Review of Systems   Constitutional:  Negative for activity change, chills, fatigue, fever and unexpected weight change.   HENT:  Positive for ear pain (R sided earache), postnasal drip and sore throat. Negative for rhinorrhea and sinus pressure.    Eyes:  Negative for pain.   Respiratory:  Positive for cough (mild and dry). Negative for choking, chest tightness, shortness of breath and wheezing.    Cardiovascular:  Negative for chest pain, palpitations and leg swelling.   Gastrointestinal:  Negative for abdominal pain, constipation, diarrhea, nausea and vomiting.   Genitourinary:  Negative for dysuria and hematuria.   Musculoskeletal:  Negative for arthralgias, back pain, gait problem, joint swelling, myalgias and neck stiffness.   Skin:  Negative for pallor and rash.   Neurological:  Negative for dizziness, tremors, seizures, syncope, light-headedness and headaches.   Hematological:  Negative for adenopathy.   Psychiatric/Behavioral:  Negative for behavioral problems.        Objective   /74 (BP Location: Left arm, Patient Position: Sitting, Cuff Size: Large)   Pulse 78   Temp 98.1 °F (36.7 °C)   Ht 5' 7\" (1.702 m)   Wt 109 kg (240 lb)   SpO2 98%   BMI 37.59 kg/m²      Physical Exam  Constitutional:       General: She is not in acute distress.     Appearance: She is well-developed. She is not diaphoretic.   HENT:      Head: Normocephalic and atraumatic.      Right Ear: External ear normal. Tympanic membrane is injected.      Left Ear: External ear normal. Tympanic membrane is injected.      Nose: Nose normal.      Mouth/Throat:      Mouth: Mucous membranes are dry.      Pharynx: Posterior oropharyngeal erythema present. No oropharyngeal exudate.   Eyes:      General: No scleral icterus.        Right eye: No discharge.         Left eye: No discharge.      Conjunctiva/sclera: Conjunctivae normal.      Pupils: Pupils are equal, round, and reactive to light.   Neck: "      Thyroid: No thyromegaly.      Vascular: No JVD.      Trachea: No tracheal deviation.   Cardiovascular:      Rate and Rhythm: Normal rate and regular rhythm.      Heart sounds: Normal heart sounds. No murmur heard.     No friction rub. No gallop.   Pulmonary:      Effort: Pulmonary effort is normal. No respiratory distress.      Breath sounds: Normal breath sounds. No wheezing or rales.   Chest:      Chest wall: No tenderness.   Abdominal:      General: Bowel sounds are normal. There is no distension.      Palpations: Abdomen is soft. There is no mass.      Tenderness: There is no abdominal tenderness. There is no guarding or rebound.   Musculoskeletal:         General: No tenderness or deformity. Normal range of motion.      Cervical back: Normal range of motion and neck supple.   Lymphadenopathy:      Head:      Right side of head: Submandibular adenopathy present.      Left side of head: Submandibular (With tenderness) adenopathy present.      Cervical: No cervical adenopathy.   Skin:     General: Skin is warm and dry.      Coloration: Skin is not pale.      Findings: No erythema or rash.   Neurological:      Mental Status: She is alert and oriented to person, place, and time.      Cranial Nerves: No cranial nerve deficit.      Motor: No abnormal muscle tone.      Coordination: Coordination normal.      Deep Tendon Reflexes: Reflexes are normal and symmetric.   Psychiatric:         Behavior: Behavior normal.

## 2025-05-13 ENCOUNTER — APPOINTMENT (OUTPATIENT)
Dept: PHYSICAL THERAPY | Facility: MEDICAL CENTER | Age: 40
End: 2025-05-13
Payer: COMMERCIAL

## 2025-05-15 ENCOUNTER — OFFICE VISIT (OUTPATIENT)
Dept: PHYSICAL THERAPY | Facility: MEDICAL CENTER | Age: 40
End: 2025-05-15
Payer: COMMERCIAL

## 2025-05-15 DIAGNOSIS — M25.562 BILATERAL CHRONIC KNEE PAIN: ICD-10-CM

## 2025-05-15 DIAGNOSIS — M17.10 PATELLOFEMORAL ARTHRITIS: Primary | ICD-10-CM

## 2025-05-15 DIAGNOSIS — G89.29 BILATERAL CHRONIC KNEE PAIN: ICD-10-CM

## 2025-05-15 DIAGNOSIS — Q68.2 PATELLA ALTA: ICD-10-CM

## 2025-05-15 DIAGNOSIS — M25.561 BILATERAL CHRONIC KNEE PAIN: ICD-10-CM

## 2025-05-15 PROCEDURE — 97110 THERAPEUTIC EXERCISES: CPT | Performed by: PHYSICAL THERAPIST

## 2025-05-15 PROCEDURE — 97112 NEUROMUSCULAR REEDUCATION: CPT | Performed by: PHYSICAL THERAPIST

## 2025-05-15 NOTE — PROGRESS NOTES
Daily Note  POC: 3/17 -   Re-eval:     Today's date: 5/15/2025  Patient name: Cara Negrete  : 1985  MRN: [id number]  Referring provider: Abhilash White DO  Dx:   Encounter Diagnosis     ICD-10-CM    1. Patellofemoral arthritis  M17.10       2. Bilateral chronic knee pain  M25.561     M25.562     G89.29       3. Patella blaine  Q68.2               Past Medical History:   Diagnosis Date    ADHD (attention deficit hyperactivity disorder)     Anesthesia complication     needed a nebulizer Tx after cholecystectomy-only happened with this surgery    Asthma     sports induced    Black stools     with abdominal pain    Breathing difficulty     Chiari malformation type II (HCC)     Clostridium difficile infection     CTS (carpal tunnel syndrome)     Eosinophilic esophagitis     GERD (gastroesophageal reflux disease)     Headache(784.0)     Headache, tension-type     High arches     Migraine     Papanicolaou smear for cervical cancer screening 2018    neg    Varicella     childhood       1 2 3 4 5 6   3/17 IE 3/24 4/15 4/29 5/6   RE 5/15   7 8 9 10 11 12           13 14 15 16 17 18           19 20 21 22 23 24           25 26 27 28 29 30             Start Time: 1730  Stop Time: 1815  Total time in clinic (min): 45 minutes    Subjective: Pt reports no new changes      Objective: See treatment diary below        Assessment: The patient tolerated the session with no pain. The patient demonstrated improved ability to control her eccentric descent but requires increased hip stability to take pressure off of her knee . The patient found the treatment today beneficial to relieving symptoms.  She will continue to benefit from hip and knee motor control and strength exercises to address her current pain and prevent it from reoccurring in the future. The patient progressed in the following exercises: Box squats . The following exercises were added to address knee-hip control : side stepping, monster walks  . She will  continue to benefit from skilled PT to address their remaining impairments, reach their goals, and maximize their rehabilitation potential.             Plan: Continue per plan of care.  Progress treatment as tolerated.    Goals  STG: Performed by weeks 2-4  1. Pt will demonstrate a strong quad contraction by week 3 as shown by 5/5 on MMT (MET)  2. Pt will report 25% less pain when squatting down/ getting off the floor by week 4 (MET)  3. Pt will report no knee locking for 2 weeks by week 4 (MET)    LTG: performed by weeks 8-discharge  1. Pt will have 0/10 pain when getting off of the floor by discharge (Progressing)  2. Pt will have no symptom recreation throughout range of motion by discharge (progressing)  3.Patient will be independent in ADL's, HEP, and be able to self manage symptoms by discharge  4.Patient will reach predicted FOTO score by discharge    Frequency: 1-2x week  Duration in weeks: 12  Plan of Care beginning date: 3/17/2025  Plan of Care expiration date: 6/9/2025  Treatment plan discussed with: patient     Precautions: Standard Precautions, Hypermobile joints    Home Exercise Program  Access Code: [email or web address]  URL: [email or web address]  Date: 03/17/2025  Prepared by: Alex Guzman    Exercises  - Supine Quad Set  - 3 x daily - 7 x weekly - 3 sets - 10 reps  - Active Straight Leg Raise with Quad Set  - 3 x daily - 7 x weekly - 3 sets - 10 reps  - Prone Quadriceps Stretch with Strap  - 3 x daily - 7 x weekly - 3 sets - 30 hold  - Forward Step Over with Counter Support  - 1 x daily - 7 x weekly - 3 sets - 10 reps    Patient seen 1:1 the whole session  Manuals 3/17 3/24 4/15 4/29 5/6 5/15       Inf Patellar glides  CB           Jose Cruz Taping  CB CB CB CB CB                                 Neuro Re-Ed             SAQ with SLR  2x 10 2x10 3x10 5#         Over and Back step for ladonna  25x each 25x ea 30x ea         LAQ  20x ea leg 30x ea 5# 30x ea 5#    10x ea slow eccentric 30x ea 7#      40x 7#       Mini squats   5x 4x10 2x 20 with GTB 40x GTB       Single leg heel taps off step   15x ea leg  15x ea leg 20x        Hip hike   30x ea          Bridges    3x10 30x 30x       Ther Ex             HEP EDU 10 min            Leg press  15x 32#    10x 75# w GTB around knees 10x 75#     10x 105#    10x  125#    10x 145#              Prone quad stretch  2x 30           Ascending/descending stairs  3x           Hamstring curls in standing   2x 15 each RTB 20x ea in sitting BTB         Side stepping      6 laps GTB       Monster walks      6 laps GTB                    Ther Activity             Re-eval     20 min                     Gait Training                                       Modalities

## 2025-05-27 ENCOUNTER — APPOINTMENT (OUTPATIENT)
Dept: PHYSICAL THERAPY | Facility: MEDICAL CENTER | Age: 40
End: 2025-05-27
Payer: COMMERCIAL

## 2025-06-03 ENCOUNTER — APPOINTMENT (OUTPATIENT)
Dept: PHYSICAL THERAPY | Facility: MEDICAL CENTER | Age: 40
End: 2025-06-03
Attending: ORTHOPAEDIC SURGERY
Payer: COMMERCIAL

## 2025-06-03 ENCOUNTER — HOSPITAL ENCOUNTER (OUTPATIENT)
Dept: MAMMOGRAPHY | Facility: HOSPITAL | Age: 40
Discharge: HOME | End: 2025-06-03
Attending: OBSTETRICS & GYNECOLOGY
Payer: COMMERCIAL

## 2025-06-03 VITALS — HEIGHT: 67 IN | WEIGHT: 240.3 LBS | BODY MASS INDEX: 37.72 KG/M2

## 2025-06-03 DIAGNOSIS — Z12.31 SCREENING MAMMOGRAM FOR BREAST CANCER: ICD-10-CM

## 2025-06-03 PROCEDURE — 77067 SCR MAMMO BI INCL CAD: CPT

## 2025-06-03 PROCEDURE — 77063 BREAST TOMOSYNTHESIS BI: CPT

## 2025-06-05 ENCOUNTER — RESULTS FOLLOW-UP (OUTPATIENT)
Age: 40
End: 2025-06-05

## 2025-06-17 ENCOUNTER — EVALUATION (OUTPATIENT)
Dept: PHYSICAL THERAPY | Facility: MEDICAL CENTER | Age: 40
End: 2025-06-17
Attending: ORTHOPAEDIC SURGERY
Payer: COMMERCIAL

## 2025-06-17 DIAGNOSIS — M25.561 BILATERAL CHRONIC KNEE PAIN: ICD-10-CM

## 2025-06-17 DIAGNOSIS — Q68.2 PATELLA ALTA: ICD-10-CM

## 2025-06-17 DIAGNOSIS — M25.562 BILATERAL CHRONIC KNEE PAIN: ICD-10-CM

## 2025-06-17 DIAGNOSIS — G89.29 BILATERAL CHRONIC KNEE PAIN: ICD-10-CM

## 2025-06-17 DIAGNOSIS — M17.10 PATELLOFEMORAL ARTHRITIS: Primary | ICD-10-CM

## 2025-06-17 PROCEDURE — 97110 THERAPEUTIC EXERCISES: CPT | Performed by: PHYSICAL THERAPIST

## 2025-06-17 PROCEDURE — 97530 THERAPEUTIC ACTIVITIES: CPT | Performed by: PHYSICAL THERAPIST

## 2025-06-17 NOTE — PROGRESS NOTES
Re-evaluation/ Discharge  POC: 3/17 -   NEW POC- -       Today's date: 2025  Patient name: Cara Negrete  : 1985  MRN: [id number]  Referring provider: Abhilash White DO  Dx:   Encounter Diagnosis     ICD-10-CM    1. Patellofemoral arthritis  M17.10       2. Bilateral chronic knee pain  M25.561     M25.562     G89.29       3. Patella blaine  Q68.2                 Past Medical History:   Diagnosis Date    ADHD (attention deficit hyperactivity disorder)     Anesthesia complication     needed a nebulizer Tx after cholecystectomy-only happened with this surgery    Asthma     sports induced    Black stools     with abdominal pain    Breathing difficulty     Chiari malformation type II (HCC)     Clostridium difficile infection     CTS (carpal tunnel syndrome)     Eosinophilic esophagitis     GERD (gastroesophageal reflux disease)     Headache(784.0)     Headache, tension-type     High arches     Migraine     Papanicolaou smear for cervical cancer screening 2018    neg    Varicella     childhood       1 2 3 4 5 6   3/17 IE 3/24 4/15 4/29 5/6   RE 5/15   7 8 9 10 11 12    RE/DC        13 14 15 16 17 18           19 20 21 22 23 24           25 26 27 28 29 30             Start Time: 1715  Stop Time: 1800  Total time in clinic (min): 45 minutes    Subjective: Patient reports that her knee is feeling much better and she is able to do most of her activities, it only hurts when on the ground for a while and then she needs to get up.      Objective: See treatment diary below  Pain  Current pain ratin-2   At best pain ratin  At worst pain ratin if she overdoes it   Location: Right Medial knee cap can move around  Quality: dull discomfort  Relieving factors: medications, ice and heat  Aggravating factors: Getting up from the ground  Progression: getting better        Objective     Tenderness     Right Knee   Tenderness in the inferior patella, lateral patella, medial patella and superior  patella.     Active Range of Motion     Right Knee   Flexion: 140 degrees   Extensor la degrees     Passive Range of Motion     Right Knee   Flexion: 142 degrees   Extension: 0 degrees     Mobility   Patellar Mobility:     Right Knee   No longer hypermobile    Additional Mobility Details  No pain with patellar movement at this time    Patellar Static Positioning   Right Knee: blaine    Joint Play     Comments  Left proximal tibiofibular joint comments: Demonstrated knee recurvatum.     Strength/Myotome Testing     Left Knee   Flexion: 5  Extension: 5  Quadriceps contraction: good    Right Knee   Flexion: 5  Extension: 5  Quadriceps contraction: fair  Hip flex: 5/5  Hip ext: 5/5  Hip abd: 5/5    Tests     Right Knee   Negative patella-femoral grind.   Negative patellar apprehension, patellar compression, valgus stress test at 0 degrees, valgus stress test at 30 degrees, varus stress test at 0 degrees and varus stress test at 30 degrees.       Assessment: Cara Negrete is a 40 y.o. female [facility]eval[facility]. This is the patient's 7th session for their patellofemoral arthritis . Pt states they have made good progress t/o the current PT plan of care thus far. Pt continues to note increased pain/difficulty with getting up off the floor and during lunging. Pt states they are 90% to their desired level with PT services thus far. ROM assessment showed full knee ROM. Strength assessment showed full knee and hip strength. Upon completion of the FOTO, the score showed a subjective increase in overall function receiving a score of 67 compared to the initial evaluation score of 55. Pt has reached almost all of their goals . Pt tolerated the session well today. Pt was a pleasure to work with and will be discharged following the session because they wish to transition to a home exercise program . The patient was educated on their condition and steps to take if their symptoms worsen. They were given a  new HEP which was reviewed with the patient who expressed understanding. All questions, comments, and concerns were addressed. Pt was encouraged to reach out to the PT with any future inquiries regarding their HEP or care.     Plan: Discharge after visit  Goals  STG: Performed by weeks 2-4  1. Pt will demonstrate a strong quad contraction by week 3 as shown by 5/5 on MMT (MET)  2. Pt will report 25% less pain when squatting down/ getting off the floor by week 4 (MET)  3. Pt will report no knee locking for 2 weeks by week 4 (MET)    LTG: performed by weeks 8-discharge  1. Pt will have 0/10 pain when getting off of the floor by discharge Almost met  2. Pt will have no symptom recreation throughout range of motion by discharge MET  3.Patient will be independent in ADL's, HEP, and be able to self manage symptoms by discharge MET  4.Patient will reach predicted FOTO score by discharge Almost met    Frequency: 1-2x week  Duration in weeks: 4  Plan of Care beginning date: 6/17/25  Plan of Care expiration date: 7/17/25  Treatment plan discussed with: patient     Precautions: Standard Precautions, Hypermobile joints    Home Exercise Program  Access Code: [email or web address]  URL: [email or web address]  Date: 06/17/2025  Prepared by: Alex Guzman    Exercises  - Supine Quad Set  - 3 x daily - 7 x weekly - 3 sets - 10 reps  - Active Straight Leg Raise with Quad Set  - 3 x daily - 7 x weekly - 3 sets - 10 reps  - Prone Quadriceps Stretch with Strap  - 3 x daily - 7 x weekly - 3 sets - 30 hold  - Forward Step Over with Counter Support  - 3 x daily - 7 x weekly - 3 sets - 10 reps  - Sitting Knee Extension with Resistance  - 3 x daily - 7 x weekly - 3 sets - 10 reps  - Standing Hamstring Curl with Resistance  - 3 x daily - 7 x weekly - 3 sets - 10 reps  - Hip Hiking on Step  - 3 x daily - 7 x weekly - 3 sets - 10 reps  - Forward Step Downs  - 3 x daily - 7 x weekly - 3 sets - 10 reps  - Sit to Stand  - 3 x daily - 7 x weekly  - 3 sets - 10 reps  - Single Leg Stance on Foam Pad  - 2 x daily - 7 x weekly - 5 sets - 10 hold  - Mini Lunge  - 3 x daily - 7 x weekly - 3 sets - 10 reps    Patient seen 1:1 the whole session  Manuals 3/17 3/24 4/15 4/29 5/6 5/15 6/17      Inf Patellar glides  CB           Billingsley Taping  CB CB CB CB CB CB                                Neuro Re-Ed             SAQ with SLR  2x 10 2x10 3x10 5#         Over and Back step for ladonna  25x each 25x ea 30x ea         LAQ  20x ea leg 30x ea 5# 30x ea 5#    10x ea slow eccentric 30x ea 7#     40x 7#       Mini squats   5x 4x10 2x 20 with GTB 40x GTB       Single leg heel taps off step   15x ea leg  15x ea leg 20x        Hip hike   30x ea          Bridges    3x10 30x 30x       Ther Ex             HEP EDU 10 min      HEW HEP EDU    Each new exercise performed 20 min totoal      Leg press  15x 32#    10x 75# w GTB around knees 10x 75#     10x 105#    10x  125#    10x 145#              Prone quad stretch  2x 30           Ascending/descending stairs  3x           Hamstring curls in standing   2x 15 each RTB 20x ea in sitting BTB         Side stepping      6 laps GTB       Monster walks      6 laps GTB                    Ther Activity             Re-eval     20 min  25 min                   Gait Training                                       Modalities

## 2025-08-06 ENCOUNTER — OFFICE VISIT (OUTPATIENT)
Dept: INTERNAL MEDICINE CLINIC | Age: 40
End: 2025-08-06
Payer: COMMERCIAL

## 2025-08-06 VITALS
DIASTOLIC BLOOD PRESSURE: 64 MMHG | TEMPERATURE: 98.1 F | OXYGEN SATURATION: 98 % | SYSTOLIC BLOOD PRESSURE: 118 MMHG | HEART RATE: 61 BPM | HEIGHT: 67 IN | BODY MASS INDEX: 37.35 KG/M2 | WEIGHT: 238 LBS

## 2025-08-06 DIAGNOSIS — F41.9 ANXIETY: ICD-10-CM

## 2025-08-06 DIAGNOSIS — E66.9 OBESITY (BMI 30-39.9): Primary | ICD-10-CM

## 2025-08-06 DIAGNOSIS — J45.30 MILD PERSISTENT ASTHMA WITHOUT COMPLICATION: ICD-10-CM

## 2025-08-06 PROCEDURE — 99214 OFFICE O/P EST MOD 30 MIN: CPT | Performed by: PHYSICIAN ASSISTANT

## 2025-08-06 RX ORDER — FERROUS SULFATE 325(65) MG
325 TABLET, DELAYED RELEASE (ENTERIC COATED) ORAL EVERY OTHER DAY
COMMUNITY

## 2025-08-06 RX ORDER — FLUOXETINE HYDROCHLORIDE 40 MG/1
40 CAPSULE ORAL DAILY
Qty: 30 CAPSULE | Refills: 2 | Status: SHIPPED | OUTPATIENT
Start: 2025-08-06 | End: 2025-09-05

## 2025-08-19 ENCOUNTER — OFFICE VISIT (OUTPATIENT)
Dept: INTERNAL MEDICINE CLINIC | Age: 40
End: 2025-08-19
Payer: COMMERCIAL

## 2025-08-19 VITALS
HEART RATE: 58 BPM | TEMPERATURE: 99.3 F | OXYGEN SATURATION: 96 % | SYSTOLIC BLOOD PRESSURE: 120 MMHG | HEIGHT: 67 IN | DIASTOLIC BLOOD PRESSURE: 60 MMHG | BODY MASS INDEX: 36.73 KG/M2 | WEIGHT: 234 LBS

## 2025-08-19 DIAGNOSIS — R05.9 COUGH, UNSPECIFIED TYPE: ICD-10-CM

## 2025-08-19 DIAGNOSIS — R50.9 FEVER, UNSPECIFIED FEVER CAUSE: ICD-10-CM

## 2025-08-19 DIAGNOSIS — H66.91 ACUTE RIGHT OTITIS MEDIA: Primary | ICD-10-CM

## 2025-08-19 DIAGNOSIS — H92.03 EARACHE SYMPTOMS IN BOTH EARS: ICD-10-CM

## 2025-08-19 LAB
SARS-COV-2 AG UPPER RESP QL IA: NEGATIVE
VALID CONTROL: NORMAL

## 2025-08-19 PROCEDURE — 87811 SARS-COV-2 COVID19 W/OPTIC: CPT | Performed by: PHYSICIAN ASSISTANT

## 2025-08-19 PROCEDURE — 99213 OFFICE O/P EST LOW 20 MIN: CPT | Performed by: PHYSICIAN ASSISTANT

## 2025-08-19 RX ORDER — AZITHROMYCIN 250 MG/1
TABLET, FILM COATED ORAL
Qty: 6 TABLET | Refills: 0 | Status: SHIPPED | OUTPATIENT
Start: 2025-08-19 | End: 2025-08-24

## (undated) DEVICE — ALLENTOWN LAP CHOLE APP PACK: Brand: CARDINAL HEALTH

## (undated) DEVICE — SKIN MARKER DUAL TIP WITH RULER CAP, FLEXIBLE RULER AND LABELS: Brand: DEVON

## (undated) DEVICE — CHLORAPREP HI-LITE 26ML ORANGE

## (undated) DEVICE — IRRIG ENDO FLO TUBING

## (undated) DEVICE — COTTON TIP APPLICTOR 2 PK

## (undated) DEVICE — ENDOPOUCH RETRIEVER SPECIMEN RETRIEVAL BAGS: Brand: ENDOPOUCH RETRIEVER

## (undated) DEVICE — SUT PDS II 0 CTB-1 27 IN ZB340

## (undated) DEVICE — SUT STRATAFIX SPIRAL 4-0 PGA/PCL 30 X 30 CM SXMD2B409

## (undated) DEVICE — ADHESIVE SKN CLSR HISTOACRYL FLEX 0.5ML LF

## (undated) DEVICE — HARMONIC 1100 SHEARS, 36CM SHAFT LENGTH: Brand: HARMONIC

## (undated) DEVICE — SUT STRATAFIX SPIRAL PDS PLUS 1 CTX 18 IN SXPP1A400

## (undated) DEVICE — ENDOPATH XCEL BLADELESS TROCARS WITH STABILITY SLEEVES: Brand: ENDOPATH XCEL

## (undated) DEVICE — TOWEL SURG XR DETECT GREEN STRL RFD

## (undated) DEVICE — INTENDED FOR TISSUE SEPARATION, AND OTHER PROCEDURES THAT REQUIRE A SHARP SURGICAL BLADE TO PUNCTURE OR CUT.: Brand: BARD-PARKER SAFETY BLADES SIZE 11, STERILE

## (undated) DEVICE — SUT VICRYL 3-0 SH 27 IN J416H

## (undated) DEVICE — VIAL DECANTER

## (undated) DEVICE — Device

## (undated) DEVICE — ENDOPATH XCEL UNIVERSAL TROCAR STABLILITY SLEEVES: Brand: ENDOPATH XCEL

## (undated) DEVICE — LIGHT HANDLE COVER SLEEVE DISP BLUE STELLAR

## (undated) DEVICE — GLOVE SRG BIOGEL ORTHOPEDIC 8

## (undated) DEVICE — 6617 IOBAN II PATIENT ISOLATION DRAPE 5/BX,4BX/CS: Brand: STERI-DRAPE™ IOBAN™ 2

## (undated) DEVICE — GLOVE PI ULTRA TOUCH SZ.7.5

## (undated) DEVICE — SUT STRATAFIX SPIRAL PDS 2-0 CT-1 45 CM SXPP1B411

## (undated) DEVICE — PACK C-SECTION PBDS

## (undated) DEVICE — SUT MONOCRYL 4-0 PS-2 27 IN Y426H

## (undated) DEVICE — DRAPE EQUIPMENT RF WAND

## (undated) DEVICE — PAD GROUNDING ADULT

## (undated) DEVICE — LIGAMAX 5 MM ENDOSCOPIC MULTIPLE CLIP APPLIER: Brand: LIGAMAX

## (undated) DEVICE — NEEDLE 22 G X 1 1/2 SAFETY

## (undated) DEVICE — SUT VICRYL 0 UR-6 27 IN J603H